# Patient Record
Sex: MALE | Race: WHITE | NOT HISPANIC OR LATINO | Employment: FULL TIME | URBAN - METROPOLITAN AREA
[De-identification: names, ages, dates, MRNs, and addresses within clinical notes are randomized per-mention and may not be internally consistent; named-entity substitution may affect disease eponyms.]

---

## 2023-03-24 PROBLEM — M10.9 GOUT: Status: ACTIVE | Noted: 2023-03-24

## 2023-03-24 PROBLEM — E55.9 MILD VITAMIN D DEFICIENCY: Status: ACTIVE | Noted: 2023-03-24

## 2023-03-24 PROBLEM — G89.29 CHRONIC RIGHT SHOULDER PAIN: Status: ACTIVE | Noted: 2023-03-24

## 2023-03-24 PROBLEM — E78.5 HYPERLIPIDEMIA: Status: ACTIVE | Noted: 2023-03-24

## 2023-03-24 PROBLEM — E03.9 HYPOTHYROID: Status: ACTIVE | Noted: 2023-03-24

## 2023-03-24 PROBLEM — M79.2 NEURALGIA OF RIGHT UPPER EXTREMITY: Status: ACTIVE | Noted: 2023-03-24

## 2023-03-24 PROBLEM — R07.89 CHEST PAIN, NON-CARDIAC: Status: ACTIVE | Noted: 2023-03-24

## 2023-03-24 PROBLEM — L30.9 DERMATITIS: Status: ACTIVE | Noted: 2023-03-24

## 2023-03-24 PROBLEM — R93.1 ELEVATED CORONARY ARTERY CALCIUM SCORE: Status: ACTIVE | Noted: 2023-03-24

## 2023-03-24 PROBLEM — M25.511 CHRONIC RIGHT SHOULDER PAIN: Status: ACTIVE | Noted: 2023-03-24

## 2023-03-24 PROBLEM — J01.90 ACUTE SINUSITIS: Status: ACTIVE | Noted: 2023-03-24

## 2023-03-24 PROBLEM — R00.2 PALPITATIONS: Status: ACTIVE | Noted: 2023-03-24

## 2023-03-24 PROBLEM — M25.569 KNEE PAIN: Status: ACTIVE | Noted: 2023-03-24

## 2023-03-24 PROBLEM — R07.89 NON-CARDIAC CHEST PAIN: Status: ACTIVE | Noted: 2023-03-24

## 2023-03-24 PROBLEM — R14.0 BLOATING: Status: ACTIVE | Noted: 2023-03-24

## 2023-03-24 PROBLEM — R39.12 WEAK URINARY STREAM: Status: ACTIVE | Noted: 2023-03-24

## 2023-03-24 PROBLEM — R06.09 EXERTIONAL DYSPNEA: Status: ACTIVE | Noted: 2023-03-24

## 2023-03-24 PROBLEM — M25.519 SHOULDER PAIN: Status: ACTIVE | Noted: 2023-03-24

## 2023-03-24 PROBLEM — L29.9 ITCHING: Status: ACTIVE | Noted: 2023-03-24

## 2023-03-24 RX ORDER — LEVOTHYROXINE SODIUM 50 UG/1
1 TABLET ORAL
COMMUNITY
Start: 2016-06-05 | End: 2023-04-25 | Stop reason: SDUPTHER

## 2023-03-24 RX ORDER — PRAVASTATIN SODIUM 80 MG/1
80 TABLET ORAL NIGHTLY
COMMUNITY
Start: 2016-11-23 | End: 2023-12-05 | Stop reason: ALTCHOICE

## 2023-03-24 RX ORDER — CICLOPIROX OLAMINE 7.7 MG/G
CREAM TOPICAL
COMMUNITY
Start: 2017-02-28 | End: 2023-06-13 | Stop reason: SDUPTHER

## 2023-03-24 RX ORDER — TAMSULOSIN HYDROCHLORIDE 0.4 MG/1
1 CAPSULE ORAL DAILY
COMMUNITY
Start: 2019-12-12 | End: 2023-06-13 | Stop reason: SDUPTHER

## 2023-03-28 ENCOUNTER — OFFICE VISIT (OUTPATIENT)
Dept: PRIMARY CARE | Facility: CLINIC | Age: 78
End: 2023-03-28
Payer: MEDICARE

## 2023-03-28 VITALS
HEART RATE: 53 BPM | DIASTOLIC BLOOD PRESSURE: 61 MMHG | WEIGHT: 162 LBS | SYSTOLIC BLOOD PRESSURE: 109 MMHG | BODY MASS INDEX: 24.55 KG/M2 | HEIGHT: 68 IN

## 2023-03-28 DIAGNOSIS — M10.072 ACUTE IDIOPATHIC GOUT INVOLVING TOE OF LEFT FOOT: Primary | ICD-10-CM

## 2023-03-28 DIAGNOSIS — N52.9 ERECTILE DYSFUNCTION, UNSPECIFIED ERECTILE DYSFUNCTION TYPE: ICD-10-CM

## 2023-03-28 PROCEDURE — 1036F TOBACCO NON-USER: CPT | Performed by: INTERNAL MEDICINE

## 2023-03-28 PROCEDURE — 99214 OFFICE O/P EST MOD 30 MIN: CPT | Performed by: INTERNAL MEDICINE

## 2023-03-28 PROCEDURE — 1159F MED LIST DOCD IN RCRD: CPT | Performed by: INTERNAL MEDICINE

## 2023-03-28 RX ORDER — TADALAFIL 10 MG/1
10 TABLET ORAL DAILY PRN
Qty: 12 TABLET | Refills: 3 | Status: SHIPPED | OUTPATIENT
Start: 2023-03-28 | End: 2024-03-19 | Stop reason: WASHOUT

## 2023-03-28 RX ORDER — INDOMETHACIN 25 MG/1
25 CAPSULE ORAL 3 TIMES DAILY PRN
Qty: 30 CAPSULE | Refills: 1 | Status: SHIPPED | OUTPATIENT
Start: 2023-03-28 | End: 2023-05-27

## 2023-03-28 ASSESSMENT — ENCOUNTER SYMPTOMS
DEPRESSION: 0
LOSS OF SENSATION IN FEET: 0
OCCASIONAL FEELINGS OF UNSTEADINESS: 0

## 2023-03-28 ASSESSMENT — PATIENT HEALTH QUESTIONNAIRE - PHQ9
SUM OF ALL RESPONSES TO PHQ9 QUESTIONS 1 AND 2: 0
2. FEELING DOWN, DEPRESSED OR HOPELESS: NOT AT ALL
1. LITTLE INTEREST OR PLEASURE IN DOING THINGS: NOT AT ALL

## 2023-03-28 ASSESSMENT — PAIN SCALES - GENERAL: PAINLEVEL: 0-NO PAIN

## 2023-03-28 NOTE — PROGRESS NOTES
"Subjective   Patient ID: Francisco Lizama is a 77 y.o. male who presents for Toe Pain.    L big toe pain & swelling    Previous Uric Acid=6.8 in 2022    HPI     L big toe pain    No trauma    No H/o Gout     Shell Fish PRN    Review of Systems    No Fever/chills/headaches/dizziness/chest pains/ shortness of breath/palpitations/Nausea/vomiting/diarrhea/ constipation/urine frequency/blood in urine.   Objective   /61 (BP Location: Left arm, Patient Position: Sitting, BP Cuff Size: Adult)   Pulse 53   Ht 1.727 m (5' 8\")   Wt 73.5 kg (162 lb)   BMI 24.63 kg/m²     Physical Exam    No JVP elevation. No palpable  Lymph Nodes    CVS-NL S1/S2 . No MRG    Lungs-CTA. B/S= B/L    Abdomen-Soft, Non-tender. No masses or HSM    Extremities: No C/C/E      Assessment/Plan       ? Gout     Recommend Indomethacin 25 mg TID/PRN for gout flare ups    Hypothyroid    HL-  Tighten diet/ Increased physical activity to a minimum of 150 minutes of moderate exercise per week.    BPH- Continue with current Rx    Continue with current Rx     CRC is due in 9/2023    Follow up PRN if symptoms persist or worsen         "

## 2023-04-15 ENCOUNTER — TELEPHONE (OUTPATIENT)
Dept: PRIMARY CARE | Facility: CLINIC | Age: 78
End: 2023-04-15
Payer: MEDICARE

## 2023-04-15 DIAGNOSIS — U07.1 COVID-19: Primary | ICD-10-CM

## 2023-04-15 NOTE — TELEPHONE ENCOUNTER
ON CALL   +COVID today, symptoms cough and fatigue started yesterday   No sob   Paxlovid candidate. No renal or hepatic issues  Hold statin/flomax during Tx and 3 d post Tx   Quarantine 5 d

## 2023-04-25 DIAGNOSIS — E03.9 HYPOTHYROIDISM, UNSPECIFIED TYPE: Primary | ICD-10-CM

## 2023-04-25 RX ORDER — LEVOTHYROXINE SODIUM 50 UG/1
50 TABLET ORAL
Qty: 90 TABLET | Refills: 3 | Status: SHIPPED | OUTPATIENT
Start: 2023-04-25 | End: 2023-09-22 | Stop reason: SDUPTHER

## 2023-04-26 ENCOUNTER — TELEPHONE (OUTPATIENT)
Dept: PRIMARY CARE | Facility: CLINIC | Age: 78
End: 2023-04-26
Payer: MEDICARE

## 2023-04-26 NOTE — TELEPHONE ENCOUNTER
Called patient and left a voice mail to call the office back. Tried to call patient again they are having trouble with their phone and is unable to hear me and keeps hanging up. Will try back again later today. Was calling patient to inform him that he tested positive for rebound covid and should isolate for 5 days.        Magaly Washington MA

## 2023-04-26 NOTE — TELEPHONE ENCOUNTER
Spoke to patient and informed him of the doctor's message. Patient was in full understanding and had no other questions or concerns. I informed him to call the office if he has any more questions.     Patient was identified with full name and date of birth.       Magaly Washington MA

## 2023-05-02 ENCOUNTER — TELEPHONE (OUTPATIENT)
Dept: PRIMARY CARE | Facility: CLINIC | Age: 78
End: 2023-05-02
Payer: MEDICARE

## 2023-05-02 NOTE — LETTER
May 8, 2023    Patient:           Francisco Lizama  YOB: 1945    From [facility]:    Return to work note PATIENTS WITH SUSPECTED or CONFIRMED COVID-19    Francisco iLzama  Date of Evaluation: 05/08/2023    Centers for Disease Control and Prevention (CDC) Criteria to discontinue home isolation and return to work:    FIVE DAYS SINCE SYMPTOMS STARTED    AND    ONE DAY of NO FEVER without the use of fever-reducing medicines like acetaminophen (Tylenol) or Ibuprofen (Motrin or Advil).    AND    Either WITHOUT SYMPTOMS or WITH IMPROVING SYMPTOMS    Francisco must continue to wear a mask around other people for a full 10 days, even if allowed to return to work.    The earliest Francisco can return to work is May 9,2023.    If you are able to test Francisco with antigen tests, and Francisco has two sequential NEGATIVE tests 48 hours apart, then Francisco may remove mask before day 10.     CDC recommends an isolation period of at least 10 days for people who cannot return to work after 5 days and up to 20 days for people who are severely ill with COVID-19 and for the people with weakened immune systems. Consult with your healthcare provider about when you can resume being around other people.    Please check the CDC website for the latest information as the criteria for home quarantine and guidelines for home isolation are changing frequently:     https://www.cdc.gov/coronavirus/2019-ncov/your-health/isolation.html     Magaly DYSON MA

## 2023-05-02 NOTE — TELEPHONE ENCOUNTER
Patient needs letter from B to state he is currently being treated for covid patient states he tested positive yesterday. This is for a refund on his flight for a trip he had planned.    Please call and advise patient when completed he will  after quarantine period. [phone]

## 2023-05-08 NOTE — TELEPHONE ENCOUNTER
Called and left voicemail to inform the patient that I am putting his letter for covid in the mail to his home address. Informed patient to call the office if he needs anything else.       Magaly Washington MA

## 2023-06-13 DIAGNOSIS — R39.12 WEAK URINARY STREAM: ICD-10-CM

## 2023-06-13 DIAGNOSIS — L30.9 DERMATITIS: ICD-10-CM

## 2023-06-13 RX ORDER — CICLOPIROX OLAMINE 7.7 MG/G
CREAM TOPICAL 2 TIMES DAILY
Qty: 45 G | Refills: 1 | Status: SHIPPED | OUTPATIENT
Start: 2023-06-13 | End: 2023-07-17 | Stop reason: SDUPTHER

## 2023-06-13 RX ORDER — TAMSULOSIN HYDROCHLORIDE 0.4 MG/1
0.4 CAPSULE ORAL 2 TIMES DAILY
Qty: 60 CAPSULE | Refills: 11 | Status: SHIPPED | OUTPATIENT
Start: 2023-06-13 | End: 2023-07-17 | Stop reason: SDUPTHER

## 2023-07-17 DIAGNOSIS — L30.9 DERMATITIS: ICD-10-CM

## 2023-07-17 DIAGNOSIS — R39.12 WEAK URINARY STREAM: ICD-10-CM

## 2023-07-17 RX ORDER — TAMSULOSIN HYDROCHLORIDE 0.4 MG/1
0.4 CAPSULE ORAL 2 TIMES DAILY
Qty: 180 CAPSULE | Refills: 3 | Status: SHIPPED | OUTPATIENT
Start: 2023-07-17 | End: 2024-03-07 | Stop reason: SDUPTHER

## 2023-07-17 RX ORDER — CICLOPIROX OLAMINE 7.7 MG/G
CREAM TOPICAL 2 TIMES DAILY
Qty: 45 G | Refills: 1 | Status: SHIPPED | OUTPATIENT
Start: 2023-07-17 | End: 2024-04-02 | Stop reason: ALTCHOICE

## 2023-09-22 DIAGNOSIS — E03.9 HYPOTHYROIDISM, UNSPECIFIED TYPE: ICD-10-CM

## 2023-09-22 RX ORDER — LEVOTHYROXINE SODIUM 50 UG/1
50 TABLET ORAL
Qty: 90 TABLET | Refills: 3 | Status: SHIPPED | OUTPATIENT
Start: 2023-09-22 | End: 2024-03-07 | Stop reason: SDUPTHER

## 2023-09-27 ENCOUNTER — TELEPHONE (OUTPATIENT)
Dept: PRIMARY CARE | Facility: CLINIC | Age: 78
End: 2023-09-27
Payer: MEDICARE

## 2023-09-27 NOTE — TELEPHONE ENCOUNTER
PT would like to know if Dr. Corcoran has a neurologist he recommends. PT having pain in her middle toes of left foot. PT stated that he is going out of town next week and is having trouble walking due to the nerve pain in foot

## 2023-09-28 ENCOUNTER — TELEPHONE (OUTPATIENT)
Dept: PRIMARY CARE | Facility: CLINIC | Age: 78
End: 2023-09-28

## 2023-09-28 DIAGNOSIS — M79.673 PAIN OF FOOT, UNSPECIFIED LATERALITY: Primary | ICD-10-CM

## 2023-11-30 ENCOUNTER — LAB (OUTPATIENT)
Dept: LAB | Facility: LAB | Age: 78
End: 2023-11-30
Payer: MEDICARE

## 2023-11-30 DIAGNOSIS — Z00.00 ROUTINE GENERAL MEDICAL EXAMINATION AT A HEALTH CARE FACILITY: ICD-10-CM

## 2023-11-30 DIAGNOSIS — R93.1 ELEVATED CORONARY ARTERY CALCIUM SCORE: ICD-10-CM

## 2023-11-30 LAB
ALBUMIN SERPL BCP-MCNC: 4.6 G/DL (ref 3.4–5)
ALP SERPL-CCNC: 58 U/L (ref 33–136)
ALT SERPL W P-5'-P-CCNC: 18 U/L (ref 10–52)
ANION GAP SERPL CALC-SCNC: 14 MMOL/L (ref 10–20)
APPEARANCE UR: CLEAR
AST SERPL W P-5'-P-CCNC: 17 U/L (ref 9–39)
BASOPHILS # BLD AUTO: 0.04 X10*3/UL (ref 0–0.1)
BASOPHILS NFR BLD AUTO: 0.7 %
BILIRUB SERPL-MCNC: 1.5 MG/DL (ref 0–1.2)
BILIRUB UR STRIP.AUTO-MCNC: NEGATIVE MG/DL
BUN SERPL-MCNC: 12 MG/DL (ref 6–23)
CALCIUM SERPL-MCNC: 9.7 MG/DL (ref 8.6–10.6)
CHLORIDE SERPL-SCNC: 105 MMOL/L (ref 98–107)
CHOLEST SERPL-MCNC: 152 MG/DL (ref 0–199)
CHOLESTEROL/HDL RATIO: 3.5
CO2 SERPL-SCNC: 28 MMOL/L (ref 21–32)
COLOR UR: YELLOW
CREAT SERPL-MCNC: 0.94 MG/DL (ref 0.5–1.3)
EOSINOPHIL # BLD AUTO: 0.09 X10*3/UL (ref 0–0.4)
EOSINOPHIL NFR BLD AUTO: 1.5 %
ERYTHROCYTE [DISTWIDTH] IN BLOOD BY AUTOMATED COUNT: 12.2 % (ref 11.5–14.5)
GFR SERPL CREATININE-BSD FRML MDRD: 83 ML/MIN/1.73M*2
GLUCOSE SERPL-MCNC: 96 MG/DL (ref 74–99)
GLUCOSE UR STRIP.AUTO-MCNC: NEGATIVE MG/DL
HCT VFR BLD AUTO: 46.1 % (ref 41–52)
HDLC SERPL-MCNC: 44 MG/DL
HGB BLD-MCNC: 15.6 G/DL (ref 13.5–17.5)
IMM GRANULOCYTES # BLD AUTO: 0.03 X10*3/UL (ref 0–0.5)
IMM GRANULOCYTES NFR BLD AUTO: 0.5 % (ref 0–0.9)
KETONES UR STRIP.AUTO-MCNC: NEGATIVE MG/DL
LDLC SERPL CALC-MCNC: 79 MG/DL
LEUKOCYTE ESTERASE UR QL STRIP.AUTO: NEGATIVE
LYMPHOCYTES # BLD AUTO: 1.52 X10*3/UL (ref 0.8–3)
LYMPHOCYTES NFR BLD AUTO: 25.5 %
MCH RBC QN AUTO: 30.6 PG (ref 26–34)
MCHC RBC AUTO-ENTMCNC: 33.8 G/DL (ref 32–36)
MCV RBC AUTO: 90 FL (ref 80–100)
MONOCYTES # BLD AUTO: 0.5 X10*3/UL (ref 0.05–0.8)
MONOCYTES NFR BLD AUTO: 8.4 %
NEUTROPHILS # BLD AUTO: 3.77 X10*3/UL (ref 1.6–5.5)
NEUTROPHILS NFR BLD AUTO: 63.4 %
NITRITE UR QL STRIP.AUTO: NEGATIVE
NON HDL CHOLESTEROL: 108 MG/DL (ref 0–149)
NRBC BLD-RTO: 0 /100 WBCS (ref 0–0)
PH UR STRIP.AUTO: 5 [PH]
PLATELET # BLD AUTO: 165 X10*3/UL (ref 150–450)
POTASSIUM SERPL-SCNC: 4.6 MMOL/L (ref 3.5–5.3)
PROT SERPL-MCNC: 6.3 G/DL (ref 6.4–8.2)
PROT UR STRIP.AUTO-MCNC: NEGATIVE MG/DL
PSA SERPL-MCNC: 1.71 NG/ML
RBC # BLD AUTO: 5.1 X10*6/UL (ref 4.5–5.9)
RBC # UR STRIP.AUTO: NEGATIVE /UL
SODIUM SERPL-SCNC: 142 MMOL/L (ref 136–145)
SP GR UR STRIP.AUTO: 1.02
TRIGL SERPL-MCNC: 145 MG/DL (ref 0–149)
TSH SERPL-ACNC: 2.62 MIU/L (ref 0.44–3.98)
URATE SERPL-MCNC: 6.7 MG/DL (ref 4–7.5)
UROBILINOGEN UR STRIP.AUTO-MCNC: <2 MG/DL
VLDL: 29 MG/DL (ref 0–40)
WBC # BLD AUTO: 6 X10*3/UL (ref 4.4–11.3)

## 2023-11-30 PROCEDURE — 85025 COMPLETE CBC W/AUTO DIFF WBC: CPT

## 2023-11-30 PROCEDURE — 84443 ASSAY THYROID STIM HORMONE: CPT

## 2023-11-30 PROCEDURE — 84550 ASSAY OF BLOOD/URIC ACID: CPT

## 2023-11-30 PROCEDURE — 80053 COMPREHEN METABOLIC PANEL: CPT

## 2023-11-30 PROCEDURE — 81003 URINALYSIS AUTO W/O SCOPE: CPT

## 2023-11-30 PROCEDURE — 36415 COLL VENOUS BLD VENIPUNCTURE: CPT

## 2023-11-30 PROCEDURE — 84153 ASSAY OF PSA TOTAL: CPT

## 2023-11-30 PROCEDURE — 80061 LIPID PANEL: CPT

## 2023-12-05 ENCOUNTER — OFFICE VISIT (OUTPATIENT)
Dept: PRIMARY CARE | Facility: CLINIC | Age: 78
End: 2023-12-05
Payer: MEDICARE

## 2023-12-05 VITALS
WEIGHT: 160 LBS | OXYGEN SATURATION: 100 % | RESPIRATION RATE: 20 BRPM | HEART RATE: 76 BPM | BODY MASS INDEX: 24.25 KG/M2 | HEIGHT: 68 IN

## 2023-12-05 DIAGNOSIS — R20.2 PARESTHESIA: ICD-10-CM

## 2023-12-05 DIAGNOSIS — R93.1 ELEVATED CORONARY ARTERY CALCIUM SCORE: ICD-10-CM

## 2023-12-05 DIAGNOSIS — Z00.00 ROUTINE GENERAL MEDICAL EXAMINATION AT A HEALTH CARE FACILITY: Primary | ICD-10-CM

## 2023-12-05 PROCEDURE — 1160F RVW MEDS BY RX/DR IN RCRD: CPT | Performed by: INTERNAL MEDICINE

## 2023-12-05 PROCEDURE — 1170F FXNL STATUS ASSESSED: CPT | Performed by: INTERNAL MEDICINE

## 2023-12-05 PROCEDURE — 1036F TOBACCO NON-USER: CPT | Performed by: INTERNAL MEDICINE

## 2023-12-05 PROCEDURE — 1126F AMNT PAIN NOTED NONE PRSNT: CPT | Performed by: INTERNAL MEDICINE

## 2023-12-05 PROCEDURE — 1159F MED LIST DOCD IN RCRD: CPT | Performed by: INTERNAL MEDICINE

## 2023-12-05 PROCEDURE — G0439 PPPS, SUBSEQ VISIT: HCPCS | Performed by: INTERNAL MEDICINE

## 2023-12-05 RX ORDER — ROSUVASTATIN CALCIUM 5 MG/1
5 TABLET, COATED ORAL NIGHTLY
COMMUNITY
Start: 2023-09-27 | End: 2023-12-29 | Stop reason: SDUPTHER

## 2023-12-05 ASSESSMENT — ACTIVITIES OF DAILY LIVING (ADL)
MANAGING_FINANCES: INDEPENDENT
DRESSING: INDEPENDENT
GROCERY_SHOPPING: INDEPENDENT
BATHING: INDEPENDENT
DOING_HOUSEWORK: INDEPENDENT
TAKING_MEDICATION: INDEPENDENT

## 2023-12-05 ASSESSMENT — PATIENT HEALTH QUESTIONNAIRE - PHQ9
1. LITTLE INTEREST OR PLEASURE IN DOING THINGS: NOT AT ALL
SUM OF ALL RESPONSES TO PHQ9 QUESTIONS 1 AND 2: 0
2. FEELING DOWN, DEPRESSED OR HOPELESS: NOT AT ALL

## 2023-12-05 ASSESSMENT — ENCOUNTER SYMPTOMS
LOSS OF SENSATION IN FEET: 1
OCCASIONAL FEELINGS OF UNSTEADINESS: 0
DEPRESSION: 0

## 2023-12-05 NOTE — PROGRESS NOTES
"Subjective   Patient ID: Francisco Lizama is a 78 y.o. male who presents for Medicare Annual Wellness Visit Subsequent.    MAW    Abnormal Lipids    Soto's Neuroma    Covid-19    HPI     Review of Systems      No Fever/chills/headaches/dizziness/chest pains/ cough/ shortness of breath/palpitations/ abdominal pain /Nausea/vomiting/diarrhea/ constipation/urine frequency/blood in urine.      Objective   Pulse 76   Resp 20   Ht 1.727 m (5' 8\")   Wt 72.6 kg (160 lb)   SpO2 100%   BMI 24.33 kg/m²     Physical Exam    No JVP elevation. No palpable Lymph Nodes. No Thyromegaly    HEENT- Negative    CVS-NL S1/S2 . No MRG    Lungs-CTA. B/S= B/L    Abdomen-Soft, Non-tender. No masses or HSM    Extremities: No C/C/E    UGT-Neg    Assessment/Plan     Abnormal Lipids    Soto's Neuroma    Covid-19    Gout    H/o Kidney stones    BPH    CRC-2018    Continue with current Rx    Follow up/ Call with any concerns    Follow up in 12 months /PRN       "

## 2023-12-29 DIAGNOSIS — R93.1 ELEVATED CORONARY ARTERY CALCIUM SCORE: Primary | ICD-10-CM

## 2023-12-29 RX ORDER — ROSUVASTATIN CALCIUM 5 MG/1
5 TABLET, COATED ORAL NIGHTLY
Qty: 90 TABLET | Refills: 3 | Status: SHIPPED | OUTPATIENT
Start: 2023-12-29 | End: 2024-12-28

## 2024-01-02 ENCOUNTER — OFFICE VISIT (OUTPATIENT)
Dept: DERMATOLOGY | Facility: CLINIC | Age: 79
End: 2024-01-02
Payer: MEDICARE

## 2024-01-02 DIAGNOSIS — D18.01 CHERRY ANGIOMA: ICD-10-CM

## 2024-01-02 DIAGNOSIS — D22.9 MULTIPLE BENIGN MELANOCYTIC NEVI: ICD-10-CM

## 2024-01-02 DIAGNOSIS — L57.8 SUN-DAMAGED SKIN: ICD-10-CM

## 2024-01-02 DIAGNOSIS — L57.0 ACTINIC KERATOSIS: Primary | ICD-10-CM

## 2024-01-02 DIAGNOSIS — L82.1 SEBORRHEIC KERATOSES: ICD-10-CM

## 2024-01-02 PROCEDURE — 1036F TOBACCO NON-USER: CPT | Performed by: DERMATOLOGY

## 2024-01-02 PROCEDURE — 17003 DESTRUCT PREMALG LES 2-14: CPT | Performed by: DERMATOLOGY

## 2024-01-02 PROCEDURE — 99203 OFFICE O/P NEW LOW 30 MIN: CPT | Performed by: DERMATOLOGY

## 2024-01-02 PROCEDURE — 17000 DESTRUCT PREMALG LESION: CPT | Performed by: DERMATOLOGY

## 2024-01-02 PROCEDURE — 1126F AMNT PAIN NOTED NONE PRSNT: CPT | Performed by: DERMATOLOGY

## 2024-01-02 PROCEDURE — 1159F MED LIST DOCD IN RCRD: CPT | Performed by: DERMATOLOGY

## 2024-01-02 ASSESSMENT — ITCH NUMERIC RATING SCALE: HOW SEVERE IS YOUR ITCHING?: 0

## 2024-01-02 ASSESSMENT — DERMATOLOGY PATIENT ASSESSMENT
DO YOU HAVE ANY NEW OR CHANGING LESIONS: YES
DO YOU USE A TANNING BED: NO
ARE YOU AN ORGAN TRANSPLANT RECIPIENT: NO
HAVE YOU HAD OR DO YOU HAVE A STAPH INFECTION: NO
HAVE YOU HAD OR DO YOU HAVE VASCULAR DISEASE: NO

## 2024-01-02 ASSESSMENT — DERMATOLOGY QUALITY OF LIFE (QOL) ASSESSMENT
RATE HOW BOTHERED YOU ARE BY SYMPTOMS OF YOUR SKIN PROBLEM (EG, ITCHING, STINGING BURNING, HURTING OR SKIN IRRITATION): 0 - NEVER BOTHERED
RATE HOW BOTHERED YOU ARE BY EFFECTS OF YOUR SKIN PROBLEMS ON YOUR ACTIVITIES (EG, GOING OUT, ACCOMPLISHING WHAT YOU WANT, WORK ACTIVITIES OR YOUR RELATIONSHIPS WITH OTHERS): 0 - NEVER BOTHERED
WHAT SINGLE SKIN CONDITION LISTED BELOW IS THE PATIENT ANSWERING THE QUALITY-OF-LIFE ASSESSMENT QUESTIONS ABOUT: NONE OF THE ABOVE
DATE THE QUALITY-OF-LIFE ASSESSMENT WAS COMPLETED: 66841
RATE HOW EMOTIONALLY BOTHERED YOU ARE BY YOUR SKIN PROBLEM (FOR EXAMPLE, WORRY, EMBARRASSMENT, FRUSTRATION): 0 - NEVER BOTHERED

## 2024-01-02 ASSESSMENT — PATIENT GLOBAL ASSESSMENT (PGA): PATIENT GLOBAL ASSESSMENT: PATIENT GLOBAL ASSESSMENT:  1 - CLEAR

## 2024-01-02 NOTE — PROGRESS NOTES
Subjective     Francisco Lizama is a 78 y.o. male who presents for the following: No chief complaint on file..     Review of Systems:  No other skin or systemic complaints other than what is documented elsewhere in the note.    The following portions of the chart were reviewed this encounter and updated as appropriate:         Skin Cancer History  No skin cancer on file.      Specialty Problems          Dermatology Problems    Dermatitis    Itching        Objective   Well appearing patient in no apparent distress; mood and affect are within normal limits.    A full examination was performed including scalp, head, eyes, ears, nose, lips, neck, chest, axillae, abdomen, back, buttocks, bilateral upper extremities, bilateral lower extremities, hands, feet, fingers, toes, fingernails, and toenails. All findings within normal limits unless otherwise noted below.    Assessment/Plan

## 2024-01-02 NOTE — PROGRESS NOTES
Subjective     Francisco Lizama is a 78 y.o. male who presents for the following: Skin Check.     Review of Systems:  No other skin or systemic complaints other than what is documented elsewhere in the note.    The following portions of the chart were reviewed this encounter and updated as appropriate:       Specialty Problems          Dermatology Problems    Dermatitis    Itching     Past Medical History:  Francisco Lizama  has a past medical history of Lipid disorder, Other specified health status, Other specified health status, Paroxysmal atrial fibrillation (CMS/HCC), Personal history of other diseases of male genital organs (07/05/2016), Personal history of other specified conditions (12/12/2017), Personal history of other specified conditions (07/05/2016), and Raynaud's syndrome without gangrene.    Past Surgical History:  Francisco Lizama  has a past surgical history that includes Tonsillectomy.    Family History:  Patient family history includes CABG in his father; No Known Problems in his brother; lung fibrosis in his mother.    Social History:  Francisco Lizama  reports that he has never smoked. He has never used smokeless tobacco. He reports current alcohol use of about 1.0 standard drink of alcohol per week. He reports that he does not use drugs.    Allergies:  Patient has no known allergies.    Current Medications / CAM's:    Current Outpatient Medications:     ciclopirox (Loprox) 0.77 % cream, Apply topically 2 times a day. APPLY ON THE SKIN BID 30 gram tube, Disp: 45 g, Rfl: 1    levothyroxine (Synthroid, Levoxyl) 50 mcg tablet, Take 1 tablet (50 mcg) by mouth once every day., Disp: 90 tablet, Rfl: 3    rosuvastatin (Crestor) 5 mg tablet, Take 1 tablet (5 mg) by mouth once daily at bedtime., Disp: 90 tablet, Rfl: 3    tadalafil (Cialis) 10 mg tablet, Take 1 tablet (10 mg) by mouth once daily as needed for erectile dysfunction., Disp: 12 tablet, Rfl: 3    tamsulosin (Flomax) 0.4 mg 24 hr capsule, Take 1  capsule (0.4 mg) by mouth 2 times a day., Disp: 180 capsule, Rfl: 3     Objective   Well appearing patient in no apparent distress; mood and affect are within normal limits.    A full examination was performed including scalp, head, eyes, ears, nose, lips, neck, chest, axillae, abdomen, back, buttocks, bilateral upper extremities, bilateral lower extremities, hands, feet, fingers, toes, fingernails, and toenails. All findings within normal limits unless otherwise noted below. Deferred genital/perianal exam.     - Scattered waxy tan/grey/brown papules with horn cysts    - scattered regular brown macules and papules    - scattered small bright red papules and macules    - scattered tan macules, telangiectasias, and general photo-damage    Dorsum of Nose, Left Parotid Area, Left Zygomatic Area (2), Mid Parietal Scalp (2)  Erythematous to tan macules with gritty scale.         Assessment/Plan   Actinic keratosis (6)  Mid Parietal Scalp (2); Dorsum of Nose; Left Zygomatic Area (2); Left Parotid Area    Actinic keratoses  - The premalignant nature of the disorder was reviewed and treatment options were reviewed.   - Patient agreeable to treatment with cryotherapy today.  Sites confirmed. Risks and benefits reviewed including but not limited to pain, redness, swelling, blister, scab, healing with hypo or hyperpigmentation, and scar. Chance of recurrence or persistence reviewed.      Destr of lesion - Dorsum of Nose, Left Parotid Area, Left Zygomatic Area (2), Mid Parietal Scalp (2)  Complexity: simple    Destruction method: cryotherapy    Informed consent: discussed and consent obtained    Lesion destroyed using liquid nitrogen: Yes    Cryotherapy cycles:  1  Outcome: patient tolerated procedure well with no complications    Post-procedure details: wound care instructions given      Related Procedures  Follow Up In Dermatology - Established Patient    Seborrheic keratoses    Seborrheic keratosis (-es)  - Discussed benign  nature and that no treatment is necessary unless it becomes painful or increases in size. Patient opts for clinical monitoring at this time.      Multiple benign melanocytic nevi    Benign melanocytic nevi  - Discussed benign nature and that no treatment is necessary unless it becomes painful or increases in size. Patient opts for clinical monitoring at this time.    - Sun protective behavior reviewed and encouraged including the use of over-the-counter sunscreen with SPF30+ daily (reapply every 1.5 hours when outdoors), UPF clothing, broad rimmed hats, sunglasses, and avoidance of midday sun. Home skin monitoring encouraged and how to monitor for skin cancer (changing or new moles, new rapidly growing or non-healing lesions) reviewed. Patient encouraged to call with interval concerns or changes.      Patricia angioma    Cherry angioma(s)  - Discussed benign nature and that no treatment is necessary unless it becomes painful or increases in size. Patient opts for clinical monitoring at this time.      Sun-damaged skin    Actinically damaged skin-  - Sun protective behavior reviewed and encouraged including the use of over-the-counter sunscreen with SPF30+ daily (reapply every 1.5 hours when outdoors), UPF clothing, broad rimmed hats, sunglasses, and avoidance of midday sun. Home skin monitoring encouraged and how to monitor for skin cancer (changing or new moles, new rapidly growing or non-healing lesions) reviewed. Patient encouraged to call with interval concerns or changes.           FUV for FBSE in 9-12 mos given significant actinic damage  Linda Baires MD       no fever and no chills.

## 2024-01-08 ENCOUNTER — TELEPHONE (OUTPATIENT)
Dept: PRIMARY CARE | Facility: CLINIC | Age: 79
End: 2024-01-08
Payer: MEDICARE

## 2024-01-08 NOTE — TELEPHONE ENCOUNTER
Spoke with patient and informed him that b had sent over a years supply to the [facility] on file in sept. He said he will call the pharmacy.      Magaly Washington MA

## 2024-01-08 NOTE — TELEPHONE ENCOUNTER
Pt call for refill on levothyroxine 50 mcg send to [facility] [place] pt also request call back from dr. MAY

## 2024-01-10 ENCOUNTER — TELEPHONE (OUTPATIENT)
Dept: CARDIOLOGY | Facility: HOSPITAL | Age: 79
End: 2024-01-10
Payer: MEDICARE

## 2024-01-10 DIAGNOSIS — I48.0 PAROXYSMAL A-FIB (MULTI): Primary | ICD-10-CM

## 2024-01-11 NOTE — TELEPHONE ENCOUNTER
Called back patient who referred an episode of 1-2 hours of irregular heart beat without significant tachycardia. No additional symptoms. Symptoms resolved spontaneously. He report a prior episode years ago of proven atrial fibrillation. He is unsure if symptoms are similar. I recommended a 2 weeks Holter and to go to the ED if symptoms reappear.    James Calix MD

## 2024-01-16 ENCOUNTER — HOSPITAL ENCOUNTER (OUTPATIENT)
Dept: CARDIOLOGY | Facility: CLINIC | Age: 79
Discharge: HOME | End: 2024-01-16
Payer: MEDICARE

## 2024-01-16 DIAGNOSIS — I48.0 PAROXYSMAL A-FIB (MULTI): ICD-10-CM

## 2024-01-16 PROCEDURE — 93246 EXT ECG>7D<15D RECORDING: CPT

## 2024-01-19 ENCOUNTER — TELEPHONE (OUTPATIENT)
Dept: PRIMARY CARE | Facility: CLINIC | Age: 79
End: 2024-01-19
Payer: MEDICARE

## 2024-01-19 NOTE — TELEPHONE ENCOUNTER
PT called and stated that his RX: tamsulosin dose was changed by Dr. Corcoran to 1 capsule once per day. PT stated that on his next RF he will need the script to state once per day not twice per day

## 2024-02-06 ENCOUNTER — HOSPITAL ENCOUNTER (OUTPATIENT)
Dept: CARDIOLOGY | Facility: CLINIC | Age: 79
Discharge: HOME | End: 2024-02-06
Payer: MEDICARE

## 2024-02-06 DIAGNOSIS — I48.91 ATRIAL FIBRILLATION, UNSPECIFIED TYPE (MULTI): ICD-10-CM

## 2024-03-07 DIAGNOSIS — E03.9 HYPOTHYROIDISM, UNSPECIFIED TYPE: ICD-10-CM

## 2024-03-07 DIAGNOSIS — R39.12 WEAK URINARY STREAM: ICD-10-CM

## 2024-03-07 RX ORDER — LEVOTHYROXINE SODIUM 50 UG/1
50 TABLET ORAL
Qty: 90 TABLET | Refills: 3 | Status: SHIPPED | OUTPATIENT
Start: 2024-03-07 | End: 2025-03-07

## 2024-03-07 RX ORDER — TAMSULOSIN HYDROCHLORIDE 0.4 MG/1
0.4 CAPSULE ORAL DAILY
Qty: 90 CAPSULE | Refills: 3 | Status: SHIPPED | OUTPATIENT
Start: 2024-03-07 | End: 2025-03-07

## 2024-03-19 ENCOUNTER — APPOINTMENT (OUTPATIENT)
Dept: CARDIOLOGY | Facility: HOSPITAL | Age: 79
End: 2024-03-19
Payer: MEDICARE

## 2024-03-19 ENCOUNTER — APPOINTMENT (OUTPATIENT)
Dept: RADIOLOGY | Facility: HOSPITAL | Age: 79
End: 2024-03-19
Payer: MEDICARE

## 2024-03-19 ENCOUNTER — APPOINTMENT (OUTPATIENT)
Dept: LAB | Facility: LAB | Age: 79
End: 2024-03-19
Payer: MEDICARE

## 2024-03-19 ENCOUNTER — HOSPITAL ENCOUNTER (OUTPATIENT)
Facility: HOSPITAL | Age: 79
Setting detail: OBSERVATION
Discharge: HOME | End: 2024-03-20
Attending: EMERGENCY MEDICINE | Admitting: INTERNAL MEDICINE
Payer: MEDICARE

## 2024-03-19 ENCOUNTER — OFFICE VISIT (OUTPATIENT)
Dept: PRIMARY CARE | Facility: CLINIC | Age: 79
End: 2024-03-19
Payer: MEDICARE

## 2024-03-19 VITALS
TEMPERATURE: 97.5 F | DIASTOLIC BLOOD PRESSURE: 67 MMHG | BODY MASS INDEX: 24.33 KG/M2 | RESPIRATION RATE: 16 BRPM | HEART RATE: 47 BPM | OXYGEN SATURATION: 96 % | WEIGHT: 160 LBS | SYSTOLIC BLOOD PRESSURE: 108 MMHG

## 2024-03-19 DIAGNOSIS — R06.02 SOB (SHORTNESS OF BREATH) ON EXERTION: ICD-10-CM

## 2024-03-19 DIAGNOSIS — R06.02 SHORTNESS OF BREATH: ICD-10-CM

## 2024-03-19 DIAGNOSIS — R07.9 CHEST PAIN, UNSPECIFIED TYPE: Primary | ICD-10-CM

## 2024-03-19 DIAGNOSIS — R29.810 FACIAL DROOP: ICD-10-CM

## 2024-03-19 DIAGNOSIS — I48.0 PAROXYSMAL ATRIAL FIBRILLATION (MULTI): ICD-10-CM

## 2024-03-19 DIAGNOSIS — R93.1 ELEVATED CORONARY ARTERY CALCIUM SCORE: ICD-10-CM

## 2024-03-19 DIAGNOSIS — E78.2 MIXED HYPERLIPIDEMIA: ICD-10-CM

## 2024-03-19 DIAGNOSIS — L30.9 DERMATITIS: ICD-10-CM

## 2024-03-19 DIAGNOSIS — R07.89 OTHER CHEST PAIN: ICD-10-CM

## 2024-03-19 DIAGNOSIS — R06.01 ORTHOPNEA: ICD-10-CM

## 2024-03-19 PROBLEM — M79.2 PERIPHERAL NEURALGIA: Status: ACTIVE | Noted: 2024-03-19

## 2024-03-19 PROBLEM — I73.00 RAYNAUD'S DISEASE: Status: ACTIVE | Noted: 2024-03-19

## 2024-03-19 PROBLEM — R06.09 EXERTIONAL DYSPNEA: Status: RESOLVED | Noted: 2023-03-24 | Resolved: 2024-03-19

## 2024-03-19 PROBLEM — R14.0 BLOATING: Status: RESOLVED | Noted: 2023-03-24 | Resolved: 2024-03-19

## 2024-03-19 PROBLEM — N40.1 BENIGN PROSTATIC HYPERPLASIA WITH WEAK URINARY STREAM: Status: ACTIVE | Noted: 2023-03-24

## 2024-03-19 PROBLEM — L29.9 ITCHING: Status: RESOLVED | Noted: 2023-03-24 | Resolved: 2024-03-19

## 2024-03-19 PROBLEM — J01.90 ACUTE SINUSITIS: Status: RESOLVED | Noted: 2023-03-24 | Resolved: 2024-03-19

## 2024-03-19 PROBLEM — M25.519 SHOULDER PAIN: Status: RESOLVED | Noted: 2023-03-24 | Resolved: 2024-03-19

## 2024-03-19 PROBLEM — R00.2 PALPITATIONS: Status: RESOLVED | Noted: 2023-03-24 | Resolved: 2024-03-19

## 2024-03-19 PROBLEM — D22.9 MULTIPLE BENIGN MELANOCYTIC NEVI: Status: ACTIVE | Noted: 2024-03-19

## 2024-03-19 PROBLEM — M1A.09X0 IDIOPATHIC CHRONIC GOUT OF MULTIPLE SITES WITHOUT TOPHUS: Status: ACTIVE | Noted: 2023-03-24

## 2024-03-19 PROBLEM — M25.569 KNEE PAIN: Status: RESOLVED | Noted: 2023-03-24 | Resolved: 2024-03-19

## 2024-03-19 PROBLEM — M79.2 NEURALGIA OF RIGHT UPPER EXTREMITY: Status: RESOLVED | Noted: 2023-03-24 | Resolved: 2024-03-19

## 2024-03-19 LAB
ALBUMIN SERPL BCP-MCNC: 4.9 G/DL (ref 3.4–5)
ALP SERPL-CCNC: 70 U/L (ref 33–136)
ALT SERPL W P-5'-P-CCNC: 15 U/L (ref 10–52)
ANION GAP SERPL CALC-SCNC: 12 MMOL/L (ref 10–20)
AST SERPL W P-5'-P-CCNC: 18 U/L (ref 9–39)
ATRIAL RATE: 46 BPM
BASOPHILS # BLD AUTO: 0.04 X10*3/UL (ref 0–0.1)
BASOPHILS NFR BLD AUTO: 0.6 %
BILIRUB SERPL-MCNC: 1.4 MG/DL (ref 0–1.2)
BNP SERPL-MCNC: 35 PG/ML (ref 0–99)
BUN SERPL-MCNC: 17 MG/DL (ref 6–23)
CALCIUM SERPL-MCNC: 9.7 MG/DL (ref 8.6–10.3)
CARDIAC TROPONIN I PNL SERPL HS: 3 NG/L (ref 0–20)
CARDIAC TROPONIN I PNL SERPL HS: 4 NG/L (ref 0–20)
CHLORIDE SERPL-SCNC: 103 MMOL/L (ref 98–107)
CO2 SERPL-SCNC: 28 MMOL/L (ref 21–32)
CREAT SERPL-MCNC: 1 MG/DL (ref 0.5–1.3)
CRP SERPL-MCNC: 0.13 MG/DL
D DIMER PPP FEU-MCNC: 321 NG/ML FEU
EGFRCR SERPLBLD CKD-EPI 2021: 78 ML/MIN/1.73M*2
EOSINOPHIL # BLD AUTO: 0.17 X10*3/UL (ref 0–0.4)
EOSINOPHIL NFR BLD AUTO: 2.6 %
ERYTHROCYTE [DISTWIDTH] IN BLOOD BY AUTOMATED COUNT: 12.3 % (ref 11.5–14.5)
GLUCOSE SERPL-MCNC: 85 MG/DL (ref 74–99)
HCT VFR BLD AUTO: 47.6 % (ref 41–52)
HGB BLD-MCNC: 16.2 G/DL (ref 13.5–17.5)
IMM GRANULOCYTES # BLD AUTO: 0.02 X10*3/UL (ref 0–0.5)
IMM GRANULOCYTES NFR BLD AUTO: 0.3 % (ref 0–0.9)
LYMPHOCYTES # BLD AUTO: 1.79 X10*3/UL (ref 0.8–3)
LYMPHOCYTES NFR BLD AUTO: 27 %
MAGNESIUM SERPL-MCNC: 2.2 MG/DL (ref 1.6–2.4)
MCH RBC QN AUTO: 30.3 PG (ref 26–34)
MCHC RBC AUTO-ENTMCNC: 34 G/DL (ref 32–36)
MCV RBC AUTO: 89 FL (ref 80–100)
MONOCYTES # BLD AUTO: 0.62 X10*3/UL (ref 0.05–0.8)
MONOCYTES NFR BLD AUTO: 9.3 %
NEUTROPHILS # BLD AUTO: 4 X10*3/UL (ref 1.6–5.5)
NEUTROPHILS NFR BLD AUTO: 60.2 %
NRBC BLD-RTO: 0 /100 WBCS (ref 0–0)
P AXIS: 58 DEGREES
P OFFSET: 176 MS
P ONSET: 120 MS
PLATELET # BLD AUTO: 174 X10*3/UL (ref 150–450)
POTASSIUM SERPL-SCNC: 4.2 MMOL/L (ref 3.5–5.3)
PR INTERVAL: 202 MS
PROT SERPL-MCNC: 7.1 G/DL (ref 6.4–8.2)
Q ONSET: 221 MS
QRS COUNT: 8 BEATS
QRS DURATION: 88 MS
QT INTERVAL: 418 MS
QTC CALCULATION(BAZETT): 365 MS
QTC FREDERICIA: 382 MS
R AXIS: 46 DEGREES
RBC # BLD AUTO: 5.35 X10*6/UL (ref 4.5–5.9)
SODIUM SERPL-SCNC: 139 MMOL/L (ref 136–145)
T AXIS: 36 DEGREES
T OFFSET: 430 MS
TSH SERPL-ACNC: 3.14 MIU/L (ref 0.44–3.98)
VENTRICULAR RATE: 46 BPM
WBC # BLD AUTO: 6.6 X10*3/UL (ref 4.4–11.3)

## 2024-03-19 PROCEDURE — 83880 ASSAY OF NATRIURETIC PEPTIDE: CPT | Performed by: INTERNAL MEDICINE

## 2024-03-19 PROCEDURE — 1159F MED LIST DOCD IN RCRD: CPT

## 2024-03-19 PROCEDURE — G0378 HOSPITAL OBSERVATION PER HR: HCPCS

## 2024-03-19 PROCEDURE — 84484 ASSAY OF TROPONIN QUANT: CPT | Performed by: EMERGENCY MEDICINE

## 2024-03-19 PROCEDURE — 83735 ASSAY OF MAGNESIUM: CPT | Performed by: NURSE PRACTITIONER

## 2024-03-19 PROCEDURE — 99285 EMERGENCY DEPT VISIT HI MDM: CPT | Mod: 25

## 2024-03-19 PROCEDURE — 71045 X-RAY EXAM CHEST 1 VIEW: CPT | Mod: FOREIGN READ | Performed by: RADIOLOGY

## 2024-03-19 PROCEDURE — 87637 SARSCOV2&INF A&B&RSV AMP PRB: CPT

## 2024-03-19 PROCEDURE — 85025 COMPLETE CBC W/AUTO DIFF WBC: CPT | Performed by: EMERGENCY MEDICINE

## 2024-03-19 PROCEDURE — 1160F RVW MEDS BY RX/DR IN RCRD: CPT

## 2024-03-19 PROCEDURE — 84443 ASSAY THYROID STIM HORMONE: CPT | Performed by: NURSE PRACTITIONER

## 2024-03-19 PROCEDURE — 84075 ASSAY ALKALINE PHOSPHATASE: CPT | Performed by: EMERGENCY MEDICINE

## 2024-03-19 PROCEDURE — 1036F TOBACCO NON-USER: CPT

## 2024-03-19 PROCEDURE — 36415 COLL VENOUS BLD VENIPUNCTURE: CPT | Performed by: EMERGENCY MEDICINE

## 2024-03-19 PROCEDURE — 83036 HEMOGLOBIN GLYCOSYLATED A1C: CPT | Mod: AHULAB | Performed by: NURSE PRACTITIONER

## 2024-03-19 PROCEDURE — 93000 ELECTROCARDIOGRAM COMPLETE: CPT

## 2024-03-19 PROCEDURE — 93005 ELECTROCARDIOGRAM TRACING: CPT

## 2024-03-19 PROCEDURE — 99213 OFFICE O/P EST LOW 20 MIN: CPT

## 2024-03-19 PROCEDURE — 86140 C-REACTIVE PROTEIN: CPT | Performed by: INTERNAL MEDICINE

## 2024-03-19 PROCEDURE — 71045 X-RAY EXAM CHEST 1 VIEW: CPT

## 2024-03-19 PROCEDURE — 2500000001 HC RX 250 WO HCPCS SELF ADMINISTERED DRUGS (ALT 637 FOR MEDICARE OP): Performed by: NURSE PRACTITIONER

## 2024-03-19 PROCEDURE — 85379 FIBRIN DEGRADATION QUANT: CPT | Performed by: EMERGENCY MEDICINE

## 2024-03-19 PROCEDURE — 99222 1ST HOSP IP/OBS MODERATE 55: CPT

## 2024-03-19 PROCEDURE — 2500000002 HC RX 250 W HCPCS SELF ADMINISTERED DRUGS (ALT 637 FOR MEDICARE OP, ALT 636 FOR OP/ED): Performed by: NURSE PRACTITIONER

## 2024-03-19 PROCEDURE — 2500000004 HC RX 250 GENERAL PHARMACY W/ HCPCS (ALT 636 FOR OP/ED): Performed by: NURSE PRACTITIONER

## 2024-03-19 PROCEDURE — 70450 CT HEAD/BRAIN W/O DYE: CPT

## 2024-03-19 PROCEDURE — 70450 CT HEAD/BRAIN W/O DYE: CPT | Performed by: RADIOLOGY

## 2024-03-19 RX ORDER — NAPROXEN SODIUM 220 MG/1
324 TABLET, FILM COATED ORAL ONCE
Status: COMPLETED | OUTPATIENT
Start: 2024-03-19 | End: 2024-03-19

## 2024-03-19 RX ORDER — ENOXAPARIN SODIUM 100 MG/ML
40 INJECTION SUBCUTANEOUS EVERY 24 HOURS
Status: DISCONTINUED | OUTPATIENT
Start: 2024-03-19 | End: 2024-03-20 | Stop reason: HOSPADM

## 2024-03-19 RX ORDER — NAPROXEN SODIUM 220 MG/1
81 TABLET, FILM COATED ORAL DAILY
Status: DISCONTINUED | OUTPATIENT
Start: 2024-03-20 | End: 2024-03-20 | Stop reason: HOSPADM

## 2024-03-19 RX ORDER — PANTOPRAZOLE SODIUM 40 MG/1
40 TABLET, DELAYED RELEASE ORAL
Status: DISCONTINUED | OUTPATIENT
Start: 2024-03-20 | End: 2024-03-20 | Stop reason: HOSPADM

## 2024-03-19 RX ORDER — TALC
3 POWDER (GRAM) TOPICAL NIGHTLY PRN
Status: DISCONTINUED | OUTPATIENT
Start: 2024-03-19 | End: 2024-03-20 | Stop reason: HOSPADM

## 2024-03-19 RX ORDER — TAMSULOSIN HYDROCHLORIDE 0.4 MG/1
0.4 CAPSULE ORAL NIGHTLY
Status: DISCONTINUED | OUTPATIENT
Start: 2024-03-19 | End: 2024-03-20 | Stop reason: HOSPADM

## 2024-03-19 RX ORDER — ACETAMINOPHEN 325 MG/1
650 TABLET ORAL EVERY 4 HOURS PRN
Status: DISCONTINUED | OUTPATIENT
Start: 2024-03-19 | End: 2024-03-20 | Stop reason: HOSPADM

## 2024-03-19 RX ORDER — LEVOTHYROXINE SODIUM 50 UG/1
50 TABLET ORAL DAILY
Status: DISCONTINUED | OUTPATIENT
Start: 2024-03-20 | End: 2024-03-20 | Stop reason: HOSPADM

## 2024-03-19 RX ORDER — ROSUVASTATIN CALCIUM 10 MG/1
5 TABLET, COATED ORAL DAILY
Status: DISCONTINUED | OUTPATIENT
Start: 2024-03-20 | End: 2024-03-20 | Stop reason: HOSPADM

## 2024-03-19 RX ORDER — POLYETHYLENE GLYCOL 3350 17 G/17G
17 POWDER, FOR SOLUTION ORAL DAILY
Status: DISCONTINUED | OUTPATIENT
Start: 2024-03-20 | End: 2024-03-20 | Stop reason: HOSPADM

## 2024-03-19 RX ORDER — ONDANSETRON HYDROCHLORIDE 2 MG/ML
4 INJECTION, SOLUTION INTRAVENOUS EVERY 8 HOURS PRN
Status: DISCONTINUED | OUTPATIENT
Start: 2024-03-19 | End: 2024-03-20 | Stop reason: HOSPADM

## 2024-03-19 RX ADMIN — TAMSULOSIN HYDROCHLORIDE 0.4 MG: 0.4 CAPSULE ORAL at 21:38

## 2024-03-19 RX ADMIN — ASPIRIN 81 MG 324 MG: 81 TABLET ORAL at 18:11

## 2024-03-19 RX ADMIN — ENOXAPARIN SODIUM 40 MG: 40 INJECTION SUBCUTANEOUS at 21:38

## 2024-03-19 SDOH — SOCIAL STABILITY: SOCIAL INSECURITY
WITHIN THE LAST YEAR, HAVE YOU BEEN KICKED, HIT, SLAPPED, OR OTHERWISE PHYSICALLY HURT BY YOUR PARTNER OR EX-PARTNER?: NO

## 2024-03-19 SDOH — SOCIAL STABILITY: SOCIAL INSECURITY
WITHIN THE LAST YEAR, HAVE TO BEEN RAPED OR FORCED TO HAVE ANY KIND OF SEXUAL ACTIVITY BY YOUR PARTNER OR EX-PARTNER?: NO

## 2024-03-19 SDOH — SOCIAL STABILITY: SOCIAL INSECURITY: DOES ANYONE TRY TO KEEP YOU FROM HAVING/CONTACTING OTHER FRIENDS OR DOING THINGS OUTSIDE YOUR HOME?: NO

## 2024-03-19 SDOH — ECONOMIC STABILITY: INCOME INSECURITY: IN THE PAST 12 MONTHS, HAS THE ELECTRIC, GAS, OIL, OR WATER COMPANY THREATENED TO SHUT OFF SERVICE IN YOUR HOME?: NO

## 2024-03-19 SDOH — SOCIAL STABILITY: SOCIAL INSECURITY: WITHIN THE LAST YEAR, HAVE YOU BEEN HUMILIATED OR EMOTIONALLY ABUSED IN OTHER WAYS BY YOUR PARTNER OR EX-PARTNER?: NO

## 2024-03-19 SDOH — SOCIAL STABILITY: SOCIAL INSECURITY: HAS ANYONE EVER THREATENED TO HURT YOUR FAMILY OR YOUR PETS?: NO

## 2024-03-19 SDOH — SOCIAL STABILITY: SOCIAL INSECURITY: ARE YOU OR HAVE YOU BEEN THREATENED OR ABUSED PHYSICALLY, EMOTIONALLY, OR SEXUALLY BY ANYONE?: NO

## 2024-03-19 SDOH — SOCIAL STABILITY: SOCIAL INSECURITY: WITHIN THE LAST YEAR, HAVE YOU BEEN AFRAID OF YOUR PARTNER OR EX-PARTNER?: NO

## 2024-03-19 SDOH — HEALTH STABILITY: MENTAL HEALTH: HOW OFTEN DO YOU HAVE A DRINK CONTAINING ALCOHOL?: 2-4 TIMES A MONTH

## 2024-03-19 SDOH — HEALTH STABILITY: MENTAL HEALTH: HOW MANY STANDARD DRINKS CONTAINING ALCOHOL DO YOU HAVE ON A TYPICAL DAY?: 1 OR 2

## 2024-03-19 SDOH — SOCIAL STABILITY: SOCIAL INSECURITY: DO YOU FEEL UNSAFE GOING BACK TO THE PLACE WHERE YOU ARE LIVING?: NO

## 2024-03-19 SDOH — HEALTH STABILITY: MENTAL HEALTH: HOW OFTEN DO YOU HAVE 6 OR MORE DRINKS ON ONE OCCASION?: NEVER

## 2024-03-19 SDOH — SOCIAL STABILITY: SOCIAL INSECURITY: HAVE YOU HAD THOUGHTS OF HARMING ANYONE ELSE?: NO

## 2024-03-19 SDOH — SOCIAL STABILITY: SOCIAL INSECURITY: WERE YOU ABLE TO COMPLETE ALL THE BEHAVIORAL HEALTH SCREENINGS?: YES

## 2024-03-19 SDOH — SOCIAL STABILITY: SOCIAL INSECURITY: DO YOU FEEL ANYONE HAS EXPLOITED OR TAKEN ADVANTAGE OF YOU FINANCIALLY OR OF YOUR PERSONAL PROPERTY?: NO

## 2024-03-19 SDOH — SOCIAL STABILITY: SOCIAL INSECURITY: ARE THERE ANY APPARENT SIGNS OF INJURIES/BEHAVIORS THAT COULD BE RELATED TO ABUSE/NEGLECT?: NO

## 2024-03-19 SDOH — SOCIAL STABILITY: SOCIAL INSECURITY: ABUSE: ADULT

## 2024-03-19 ASSESSMENT — ENCOUNTER SYMPTOMS
DIAPHORESIS: 0
HEMATURIA: 0
FATIGUE: 1
VOICE CHANGE: 0
SORE THROAT: 0
APNEA: 0
CHEST TIGHTNESS: 1
SHORTNESS OF BREATH: 1
CHILLS: 0
COUGH: 0
SLEEP DISTURBANCE: 0
POLYPHAGIA: 0
PHOTOPHOBIA: 0
LIGHT-HEADEDNESS: 0
AGITATION: 0
NAUSEA: 0
ENDOCRINE NEGATIVE: 1
DIARRHEA: 1
FACIAL ASYMMETRY: 1
SPEECH DIFFICULTY: 0
WHEEZING: 0
RHINORRHEA: 0
CONFUSION: 0
DYSURIA: 0
MYALGIAS: 0
NUMBNESS: 0
DIFFICULTY URINATING: 0
SINUS PRESSURE: 0
BACK PAIN: 0
ACTIVITY CHANGE: 0
DEPRESSION: 0
TROUBLE SWALLOWING: 0
DYSPHORIC MOOD: 0
ARTHRALGIAS: 1
NECK STIFFNESS: 0
COLOR CHANGE: 0
FEVER: 0
ABDOMINAL PAIN: 0
RECTAL PAIN: 0
BRUISES/BLEEDS EASILY: 0
CONSTIPATION: 0
SEIZURES: 0
PSYCHIATRIC NEGATIVE: 1
EYES NEGATIVE: 1
HEMATOLOGIC/LYMPHATIC NEGATIVE: 1
NERVOUS/ANXIOUS: 0
ABDOMINAL DISTENTION: 0
NECK PAIN: 0
EYE DISCHARGE: 0
APPETITE CHANGE: 0
POLYDIPSIA: 0
BLOOD IN STOOL: 0
OCCASIONAL FEELINGS OF UNSTEADINESS: 0
JOINT SWELLING: 0
FREQUENCY: 0
LOSS OF SENSATION IN FEET: 0
HEADACHES: 1
STRIDOR: 0
ANAL BLEEDING: 0
PALPITATIONS: 0
WEAKNESS: 0
FLANK PAIN: 0
DIZZINESS: 0
TREMORS: 0
HYPERACTIVE: 0
UNEXPECTED WEIGHT CHANGE: 0

## 2024-03-19 ASSESSMENT — PATIENT HEALTH QUESTIONNAIRE - PHQ9
SUM OF ALL RESPONSES TO PHQ9 QUESTIONS 1 AND 2: 0
1. LITTLE INTEREST OR PLEASURE IN DOING THINGS: NOT AT ALL
SUM OF ALL RESPONSES TO PHQ9 QUESTIONS 1 & 2: 0
1. LITTLE INTEREST OR PLEASURE IN DOING THINGS: NOT AT ALL
2. FEELING DOWN, DEPRESSED OR HOPELESS: NOT AT ALL
2. FEELING DOWN, DEPRESSED OR HOPELESS: NOT AT ALL

## 2024-03-19 ASSESSMENT — LIFESTYLE VARIABLES
HOW OFTEN DO YOU HAVE A DRINK CONTAINING ALCOHOL: 2-4 TIMES A MONTH
SUBSTANCE_ABUSE_PAST_12_MONTHS: NO
PRESCIPTION_ABUSE_PAST_12_MONTHS: NO
SKIP TO QUESTIONS 9-10: 1
AUDIT-C TOTAL SCORE: 2
AUDIT-C TOTAL SCORE: 2
SKIP TO QUESTIONS 9-10: 1
HOW OFTEN DO YOU HAVE 6 OR MORE DRINKS ON ONE OCCASION: NEVER
HOW MANY STANDARD DRINKS CONTAINING ALCOHOL DO YOU HAVE ON A TYPICAL DAY: 1 OR 2
AUDIT-C TOTAL SCORE: 2

## 2024-03-19 ASSESSMENT — ACTIVITIES OF DAILY LIVING (ADL)
GROOMING: INDEPENDENT
PATIENT'S MEMORY ADEQUATE TO SAFELY COMPLETE DAILY ACTIVITIES?: YES
WALKS IN HOME: INDEPENDENT
DRESSING YOURSELF: INDEPENDENT
HEARING - LEFT EAR: FUNCTIONAL
ADEQUATE_TO_COMPLETE_ADL: YES
HEARING - RIGHT EAR: FUNCTIONAL
JUDGMENT_ADEQUATE_SAFELY_COMPLETE_DAILY_ACTIVITIES: YES
TOILETING: INDEPENDENT
LACK_OF_TRANSPORTATION: NO
FEEDING YOURSELF: INDEPENDENT
BATHING: INDEPENDENT

## 2024-03-19 ASSESSMENT — PAIN - FUNCTIONAL ASSESSMENT: PAIN_FUNCTIONAL_ASSESSMENT: 0-10

## 2024-03-19 ASSESSMENT — COLUMBIA-SUICIDE SEVERITY RATING SCALE - C-SSRS
6. HAVE YOU EVER DONE ANYTHING, STARTED TO DO ANYTHING, OR PREPARED TO DO ANYTHING TO END YOUR LIFE?: NO
1. IN THE PAST MONTH, HAVE YOU WISHED YOU WERE DEAD OR WISHED YOU COULD GO TO SLEEP AND NOT WAKE UP?: NO
2. HAVE YOU ACTUALLY HAD ANY THOUGHTS OF KILLING YOURSELF?: NO

## 2024-03-19 ASSESSMENT — HEART SCORE
TROPONIN: LESS THAN OR EQUAL TO NORMAL LIMIT
AGE: 65+
ECG: NORMAL
HISTORY: MODERATELY SUSPICIOUS
HEART SCORE: 4
RISK FACTORS: 1-2 RISK FACTORS

## 2024-03-19 ASSESSMENT — PAIN SCALES - GENERAL: PAINLEVEL_OUTOF10: 0 - NO PAIN

## 2024-03-19 NOTE — PROGRESS NOTES
Pharmacy Medication History Review    Francisco Lizama is a 78 y.o. male admitted for No Principal Problem: There is no principal problem currently on the Problem List. Please update the Problem List and refresh.. Pharmacy reviewed the patient's [id number] medications and allergies for accuracy.    The list below reflectives the updated PTA list. Please review each medication in order reconciliation for additional clarification and justification.  Prior to Admission medications    Medication Sig Start Date End Date Taking? Authorizing Provider        MD Tommy Hardy MD Andres Schuster, MD Roy M Buchinsky, MD Roy M Buchinsky, MD        The list below reflectives the updated allergy list. Please review each documented allergy for additional clarification and justification.  Allergies  Reviewed by PARTH Hinojosa on 3/19/2024   No Known Allergies         Below are additional concerns with the patient's PTA list.  Prior to Admission Medications   Prescriptions Last Dose Informant   ciclopirox (Loprox) 0.77 % cream     Sig: Apply topically 2 times a day. APPLY ON THE SKIN BID 30 gram tube   Patient taking differently: Apply 1 Application topically 2 times a day as needed. APPLY ON THE SKIN BID 30 gram tube   levothyroxine (Synthroid, Levoxyl) 50 mcg tablet 3/19/2024    Sig: Take 1 tablet (50 mcg) by mouth once every day.   rosuvastatin (Crestor) 5 mg tablet 3/19/2024    Sig: Take 1 tablet (5 mg) by mouth once daily at bedtime.   Patient taking differently: Take 1 tablet (5 mg) by mouth once daily.           tamsulosin (Flomax) 0.4 mg 24 hr capsule 3/18/2024    Sig: Take 1 capsule (0.4 mg) by mouth once daily.   Patient taking differently: Take 1 capsule (0.4 mg) by mouth once daily at bedtime.      Facility-Administered Medications: None      Per patient.     Willa Kramer

## 2024-03-19 NOTE — HOSPITAL COURSE
Francisco Lizama is a 78 y.o. male, with a PMH of gout, HLD, hypothyroidism, non-obstructive CAD, BPH, and pAF, who is being admitted under observation status at [facility] on 3/19/2024 for chest pain and shortness of breath.     Patient intitally presented to the ED on 3/19/2024 for c/o chest heaviness and shortness of breath x1.5 months. SOB and chest pain is mainly present with exertion. Patient saw his PCP today and there was concern for left facial droop and SpO2 dropped to 90% while ambulating. For those reasons, patient was referred to the ED.    In the ED, VSS with low HR and EKG SB without ischemic changes. Long hx of bradycardia, prior long distance runner. Neurological exam normal with no facial droop. Labs notable for negative troponin x2, normal d-dimer and BNP, and otherwise unremarkable CMP/CBC. Viral swab pending . CTH without acute intracranial process and CXR unremarkable. Patient was admitted for further evaluation.        Patient is a 78 y.o. male admitted under observation status for chest pain and COVINGTON.    Acute Chest Pain  Shortness of Breath  COVINGTON  Hx of HLD  - VSS, HEART score 4  - Stress test 8/2023: No clinical or diagnostic electrocardiographic evidence for ischemia at a maximal workload   - LHC 1/2020: Non-obstructive CAD  - TTE 1/202: EF 60-65%, no structural abnormalities  - Repeat echo ordered  - Troponin negative x2  - EKG SB with no ischemic changes  - Cards consult  - Load with ASA and start daily regimen, c/w statin  - Monitor tele  - Check HgbA1c, lipid panel, and TSH  - CXR with bibasilar atelectasis  - Follow viral swab results  - Maintain K+ >4 and Mg >2  - Cardiac HH diet    Paroxsymal Atrial Fibrillation  - Current rhythm: SB (baseline HR 40-50s)  - Recent Holter in Jan and Feb with no episodes of atrial fib and no arrhythmias associated with symptoms reported during monitoring  - Does not appear to have ever been on anticoagulation  - Continuous tele  - Keep K+ >4 and Mg  >2     BPH  - Continue home Tamsulosin  - Monitor for retention, bladder scan PRN    Hypothyroidism  - Checking TSH/T4  - c/w home Levothyroxine 50mcg daily      VTE Prophylaxis: SCDs/TEDs, ambulation, SQ Lovenox  Code Status: No Order    Chart, medical history, and labs/testing reviewed in detail.   Disposition: Discharge home once medically cleared, pending cardiology recommendations

## 2024-03-19 NOTE — ED PROVIDER NOTES
HPI   Chief Complaint   Patient presents with    Abnormal ECG       This is a 78-year-old male who presents to the emergency department complaining of chest heaviness and shortness of breath.  The patient reports the symptoms have been present for approximately 1 and half months.  He reports the symptoms with exertion like running and going up steps.  The patient followed up with his primary care physician's office today.  After walking around the office his pulse ox dropped to 90%.  There was also concern that he had left-sided facial drooping.  For these reasons he was sent to the emergency department for evaluation.  The patient reports that the symptoms have been been consistent.  The symptoms improved with rest.  He reports a history of A-fib and has occasionally felt irregular heartbeats.    Past medical history: Hyperlipidemia, BPH, hypothyroid, atrial fibrillation                          No data recorded                   Patient History   Past Medical History:   Diagnosis Date    Lipid disorder     Other specified health status     No pertinent past surgical history    Other specified health status     No pertinent past medical history    Paroxysmal atrial fibrillation (CMS/HCC)     Paroxysmal atrial fibrillation    Personal history of other diseases of male genital organs 07/05/2016    History of scrotal mass    Personal history of other specified conditions 12/12/2017    History of nocturia    Personal history of other specified conditions 07/05/2016    History of shortness of breath    Raynaud's syndrome without gangrene     Raynaud's disease     Past Surgical History:   Procedure Laterality Date    TONSILLECTOMY       Family History   Problem Relation Name Age of Onset    Other (lung fibrosis) Mother      Other (CABG) Father      No Known Problems Brother       Social History     Tobacco Use    Smoking status: Never    Smokeless tobacco: Never   Substance Use Topics    Alcohol use: Yes      Alcohol/week: 1.0 standard drink of alcohol     Types: 1 Standard drinks or equivalent per week    Drug use: Never       Physical Exam   ED Triage Vitals [03/19/24 1038]   Temperature Heart Rate Respirations BP   35.7 °C (96.2 °F) (!) 47 15 135/81      Pulse Ox Temp src Heart Rate Source Patient Position   100 % -- -- --      BP Location FiO2 (%)     -- --       Physical Exam  Vitals and nursing note reviewed.   HENT:      Head: Normocephalic and atraumatic.      Nose: Nose normal.   Eyes:      Conjunctiva/sclera: Conjunctivae normal.   Cardiovascular:      Rate and Rhythm: Normal rate and regular rhythm.      Pulses: Normal pulses.      Heart sounds: Normal heart sounds.   Pulmonary:      Effort: Pulmonary effort is normal.      Breath sounds: Normal breath sounds.   Abdominal:      General: Bowel sounds are normal.      Palpations: Abdomen is soft.   Musculoskeletal:         General: Normal range of motion.      Cervical back: Normal range of motion and neck supple.   Skin:     Findings: No rash.   Neurological:      General: No focal deficit present.      Mental Status: He is alert and oriented to person, place, and time.   Psychiatric:         Mood and Affect: Mood normal.         ED Course & MDM   Diagnoses as of 03/19/24 1539   Chest pain, unspecified type       Medical Decision Making  Differential diagnosis: I have considered the following conditions in my assessment of   this patient's condition:  GERD, musculoskeletal chest pain, aortic dissection, cholecystitis,   ACS, pericarditis, myocarditis, tamponade, shingles,  pneumonia, pneumothorax, pulmonary embolus.    This is a 78-year-old male who presents to the emergency department with chest pain and shortness of breath which has been present for 1 mooney months.  The patient reportedly had facial drooping in the office.  His neurologic exam is normal at the time of his evaluation in the emergency department.  His EKG shows a sinus bradycardia.  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  His EKG has showed bradycardia going back to 2003. Troponin was negative x 2.  He will be further evaluated with an head CT and a D-dimer.  D-dimer was negative at 321.  With low pretest probability this rules out PE.  CT of the head was negative.  The patient's symptoms are typical of angina.  Suggested admission to the hospital for further evaluation and management.  Patient agrees.    Amount and/or Complexity of Data Reviewed  ECG/medicine tests: independent interpretation performed.     Details: Sinus bradycardia, heart rate 46, normal axis, no ST elevations,        Procedure  Procedures     Finesse Joel MD  03/19/24 5100

## 2024-03-19 NOTE — H&P
History Of Present Illness  Francisco Lizama is a 78 y.o. male, with a PMH of gout, HLD, hypothyroidism, non-obstructive CAD, BPH, and pAF, who is being admitted under observation status at [facility] on 3/19/2024 for chest pain and shortness of breath.     Patient intitally presented to the ED on 3/19/2024 for c/o chest heaviness and shortness of breath x1.5 months. SOB and chest pain is mainly present with exertion. Patient saw his PCP today and there was concern for left facial droop and SpO2 dropped to 90% while ambulating. For those reasons, patient was referred to the ED.    In the ED, VSS with low HR and EKG SB without ischemic changes. Long hx of bradycardia, prior long distance runner. Neurological exam normal with no facial droop- does have a crooked smile which is not new per patient and family. Labs notable for negative troponin x2, normal d-dimer and BNP, and otherwise unremarkable CMP/CBC. Viral swab pending . CTH without acute intracranial process and CXR unremarkable. Patient was admitted for further evaluation.     Past Medical History  He has a past medical history of Acquired hypothyroidism, BPH (benign prostatic hyperplasia), Gout, Mixed hyperlipidemia, Paroxysmal atrial fibrillation (CMS/HCC), Raynaud's syndrome without gangrene, and Scrotal mass.    Surgical History  He has a past surgical history that includes Tonsillectomy.     Social History  He reports that he has never smoked. He has never used smokeless tobacco. He reports current alcohol use of about 1.0 standard drink of alcohol per week. He reports that he does not use drugs.    Family History  Family History   Problem Relation Name Age of Onset    Other (lung fibrosis) Mother      Other (CABG) Father      No Known Problems Brother          Allergies  Patient has no known allergies.    Review of Systems     Physical Exam     Last Recorded Vitals  /58   Pulse (!) 47   Temp 35.7 °C (96.2 °F)   Resp 13   Wt 72.6 kg (160 lb)    SpO2 96%     Relevant Results  Scheduled medications  aspirin, 324 mg, oral, Once  [START ON 3/20/2024] aspirin, 81 mg, oral, Daily  enoxaparin, 40 mg, subcutaneous, q24h  [START ON 3/20/2024] levothyroxine, 50 mcg, oral, Daily  [START ON 3/20/2024] pantoprazole, 40 mg, oral, Daily before breakfast  [START ON 3/20/2024] polyethylene glycol, 17 g, oral, Daily  [START ON 3/20/2024] rosuvastatin, 5 mg, oral, Daily  tamsulosin, 0.4 mg, oral, Nightly      Continuous medications     PRN medications  PRN medications: acetaminophen, melatonin, ondansetron  Results for orders placed or performed during the hospital encounter of 03/19/24 (from the past 24 hour(s))   ECG 12 lead   Result Value Ref Range    Ventricular Rate 46 BPM    Atrial Rate 46 BPM    AL Interval 202 ms    QRS Duration 88 ms    QT Interval 418 ms    QTC Calculation(Bazett) 365 ms    P Axis 58 degrees    R Axis 46 degrees    T Axis 36 degrees    QRS Count 8 beats    Q Onset 221 ms    P Onset 120 ms    P Offset 176 ms    T Offset 430 ms    QTC Fredericia 382 ms   CBC with Differential   Result Value Ref Range    WBC 6.6 4.4 - 11.3 x10*3/uL    nRBC 0.0 0.0 - 0.0 /100 WBCs    RBC 5.35 4.50 - 5.90 x10*6/uL    Hemoglobin 16.2 13.5 - 17.5 g/dL    Hematocrit 47.6 41.0 - 52.0 %    MCV 89 80 - 100 fL    MCH 30.3 26.0 - 34.0 pg    MCHC 34.0 32.0 - 36.0 g/dL    RDW 12.3 11.5 - 14.5 %    Platelets 174 150 - 450 x10*3/uL    Neutrophils % 60.2 40.0 - 80.0 %    Immature Granulocytes %, Automated 0.3 0.0 - 0.9 %    Lymphocytes % 27.0 13.0 - 44.0 %    Monocytes % 9.3 2.0 - 10.0 %    Eosinophils % 2.6 0.0 - 6.0 %    Basophils % 0.6 0.0 - 2.0 %    Neutrophils Absolute 4.00 1.60 - 5.50 x10*3/uL    Immature Granulocytes Absolute, Automated 0.02 0.00 - 0.50 x10*3/uL    Lymphocytes Absolute 1.79 0.80 - 3.00 x10*3/uL    Monocytes Absolute 0.62 0.05 - 0.80 x10*3/uL    Eosinophils Absolute 0.17 0.00 - 0.40 x10*3/uL    Basophils Absolute 0.04 0.00 - 0.10 x10*3/uL   Comprehensive  Metabolic Panel   Result Value Ref Range    Glucose 85 74 - 99 mg/dL    Sodium 139 136 - 145 mmol/L    Potassium 4.2 3.5 - 5.3 mmol/L    Chloride 103 98 - 107 mmol/L    Bicarbonate 28 21 - 32 mmol/L    Anion Gap 12 10 - 20 mmol/L    Urea Nitrogen 17 6 - 23 mg/dL    Creatinine 1.00 0.50 - 1.30 mg/dL    eGFR 78 >60 mL/min/1.73m*2    Calcium 9.7 8.6 - 10.3 mg/dL    Albumin 4.9 3.4 - 5.0 g/dL    Alkaline Phosphatase 70 33 - 136 U/L    Total Protein 7.1 6.4 - 8.2 g/dL    AST 18 9 - 39 U/L    Bilirubin, Total 1.4 (H) 0.0 - 1.2 mg/dL    ALT 15 10 - 52 U/L   Troponin I, High Sensitivity, Initial   Result Value Ref Range    Troponin I, High Sensitivity 3 0 - 20 ng/L   B-type natriuretic peptide   Result Value Ref Range    BNP 35 0 - 99 pg/mL   Troponin, High Sensitivity, 1 Hour   Result Value Ref Range    Troponin I, High Sensitivity 4 0 - 20 ng/L   C-reactive protein   Result Value Ref Range    C-Reactive Protein 0.13 <1.00 mg/dL   D-dimer, VTE Exclusion   Result Value Ref Range    D-Dimer, Quantitative VTE Exclusion 321 <=500 ng/mL FEU     XR chest 1 view    Result Date: 3/19/2024  STUDY: Chest Radiograph;  3/19/2024, 4:33PM INDICATION: Shortness of breath. COMPARISON: 1/19/2020 XR Chest ACCESSION NUMBER(S): [id number] ORDERING CLINICIAN: SOLOMON GARVIN TECHNIQUE:  Frontal chest was obtained at 16:33 hours. FINDINGS: CARDIOMEDIASTINAL SILHOUETTE: Cardiomediastinal silhouette is normal in size and configuration.  LUNGS: Minimal linear areas of atelectasis or scarring in the lung bases  ABDOMEN: No remarkable upper abdominal findings.  BONES: No acute osseous changes.    Minimal linear areas of atelectasis or scarring in the lung bases. Signed by Oniel Alejandro MD    CT head wo IV contrast    Result Date: 3/19/2024  Interpreted By:  Dilia Beckett, STUDY: CT HEAD WO IV CONTRAST;  3/19/2024 1:51 pm   INDICATION: Signs/Symptoms:facial droop.   COMPARISON: None.   ACCESSION NUMBER(S): [id number]   ORDERING CLINICIAN:  BRANDIN ELLIS   TECHNIQUE: Unenhanced CT images of the head were obtained.   FINDINGS: The ventricles, cisterns and sulci are prominent, consistent with diffuse volume loss. There are areas of nonspecific white matter hypodensity, which are probably age related or microvascular in nature.   There is no acute intracranial hemorrhage, mass effect or midline shift. No extraaxial fluid collection.   No focal calvarial lesion. 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   Low attenuation nodular probable mucous retention cyst in the inferior left maxillary sinus. Remaining visualized paranasal sinuses are clear.       No acute intracranial hemorrhage or mass-effect.   Probable mucous retention cyst in the left maxillary sinus.   MACRO: None.   Signed by: Dilia Beckett 3/19/2024 2:24 PM Dictation workstation:   [id number]    ECG 12 lead    Result Date: 3/19/2024  Sinus bradycardia Otherwise normal ECG When compared with ECG of 11-AUG-2023 14:48, No significant change was found See ED provider note for full interpretation and clinical correlation Confirmed by Nelia Sparrow ([id number]) on 3/19/2024 11:50:11 AM     Assessment/Plan   Principal Problem:    Other chest pain  Active Problems:    Elevated coronary artery calcium score    Idiopathic chronic gout of multiple sites without tophus    Mixed hyperlipidemia    Acquired hypothyroidism    Mild vitamin D deficiency    Benign prostatic hyperplasia with weak urinary stream    Paroxysmal atrial fibrillation (CMS/HCC)    Shortness of breath      Patient is a 78 y.o. male admitted under observation status for chest pain and COVINGTON.    Acute Chest Pain  Shortness of Breath  COV[place]  Hx of HLD  - VSS, HEART score 4  - Stress test 8/2023: No clinical or diagnostic electrocardiographic evidence for ischemia at a maximal workload   - [facility] 1/2020: Non-obstructive CAD  - TTE 1/202: EF 60-65%, no structural abnormalities  -  Repeat echo ordered  - Troponin negative x2  - EKG SB with no ischemic changes  - Cards consult  - NPO after midnight until seen by Cardiology  - Load with ASA and start daily regimen, c/w statin  - Monitor tele  - Check HgbA1c, lipid panel, and TSH  - CXR with bibasilar atelectasis  - Follow viral swab results  - Maintain K+ >4 and Mg >2  - Cardiac HH diet    Paroxsymal Atrial Fibrillation  - Current rhythm: SB (baseline HR 40-50s)  - Recent Holter in Jan and Feb with no episodes of atrial fib and no arrhythmias associated with symptoms reported during monitoring  - Does not appear to have ever been on anticoagulation  - Continuous tele  - Keep K+ >4 and Mg >2     BPH  - Continue home Tamsulosin  - Monitor for retention, bladder scan PRN    Hypothyroidism  - Checking TSH/T4  - c/w home Levothyroxine 50mcg daily    VTE Prophylaxis: SCDs/TEDs, ambulation, SQ Lovenox  Code Status: No Order    Chart, medical history, and labs/testing reviewed in detail.   Disposition: Discharge home once medically cleared, pending cardiology recommendations     CHERRY Youngblood-CNP

## 2024-03-19 NOTE — PROGRESS NOTES
Primary Care Provider: Tommy Corcoran MD    Subjective   Francisco Lizama is a 78 y.o. male who presents for Nasal Congestion, Night Sweats, Shortness of Breath (On movement), Joint Pain, and Epistaxis (Nose Bleed).    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 Pertinent negatives include no abdominal pain, ear pain, fever, leg swelling, neck pain, rash, rhinorrhea, sore throat or wheezing.   Epistaxis (Nose Bleed)          Review of Systems   Constitutional:  Positive for fatigue. Negative for activity change, appetite change, chills, diaphoresis, fever and unexpected weight change.   HENT:  Positive for nosebleeds. Negative for congestion, dental problem, ear discharge, ear pain, hearing loss, mouth sores, postnasal drip, rhinorrhea, sinus pressure, sneezing, sore throat, tinnitus, trouble swallowing and voice change.    Eyes: Negative.  Negative for photophobia, discharge and visual disturbance.   Respiratory:  Positive for chest tightness and shortness of breath. Negative for apnea, cough, wheezing and stridor.    Cardiovascular:  Positive for chest pain. Negative for palpitations and leg swelling.   Gastrointestinal:  Positive  for diarrhea. Negative for abdominal distention, abdominal pain, anal bleeding, blood in stool, constipation, nausea and rectal pain.   Endocrine: Negative.  Negative for cold intolerance, heat intolerance, polydipsia, polyphagia and polyuria.   Genitourinary: Negative.  Negative for decreased urine volume, difficulty urinating, dysuria, flank pain, frequency, hematuria and urgency.   Musculoskeletal:  Positive for arthralgias. Negative for back pain, gait problem, joint swelling, myalgias, neck pain and neck stiffness.   Skin: Negative.  Negative for color change and rash.   Neurological:  Positive for facial asymmetry and headaches. Negative for dizziness, tremors, seizures, syncope, speech difficulty, weakness, light-headedness and numbness.   Hematological: Negative.  Does not bruise/bleed easily.   Psychiatric/Behavioral: Negative.  Negative for agitation, confusion, dysphoric mood, sleep disturbance and suicidal ideas. The patient is not nervous/anxious and is not hyperactive.    All other systems reviewed and are negative.        Objective   /67 (BP Location: Right arm, Patient Position: Sitting, BP Cuff Size: Adult)   Pulse (!) 47   Temp 36.4 °C (97.5 °F) (Oral)   Resp 16   Wt 72.6 kg (160 lb)   SpO2 96%   BMI 24.33 kg/m²     3minute in office walk test: SpO2 90% and HR 66; SOB after walking      Physical Exam  Vitals reviewed.   Constitutional:       General: He is not in acute distress.     Appearance: Normal appearance. He is normal weight. He is not ill-appearing, toxic-appearing or diaphoretic.   HENT:      Head: Normocephalic and atraumatic.      Nose: Nose normal.   Eyes:      Extraocular Movements: Extraocular movements intact.      Conjunctiva/sclera: Conjunctivae normal.      Pupils: Pupils are equal, round, and reactive to light.   Neck:      Vascular: No carotid bruit.   Cardiovascular:      Rate and Rhythm: Bradycardia present.      Pulses: Normal pulses.      Heart sounds: Normal  heart sounds. No murmur heard.     No friction rub. No gallop.   Pulmonary:      Effort: Pulmonary effort is normal. No respiratory distress.      Breath sounds: Normal breath sounds.   Abdominal:      General: Abdomen is flat. Bowel sounds are normal.      Palpations: Abdomen is soft.   Musculoskeletal:         General: Normal range of motion.      Cervical back: Normal range of motion and neck supple.   Lymphadenopathy:      Cervical: No cervical adenopathy.   Skin:     General: Skin is warm and dry.      Capillary Refill: Capillary refill takes less than 2 seconds.   Neurological:      Mental Status: He is alert and oriented to person, place, and time. Mental status is at baseline.      Comments: Left facial droop   Psychiatric:         Mood and Affect: Mood normal.         Behavior: Behavior normal.         Thought Content: Thought content normal.         Judgment: Judgment normal.         Assessment/Plan   Problem List Items Addressed This Visit    Sick visit  Visit Diagnoses       Chest pain, unspecified type    -  Primary    Relevant Orders    ECG 12 lead (Clinic Performed) (Completed)    RSV PCR    Influenza A, and B PCR    SOB (shortness of breath) on exertion        Orthopnea        Facial droop            Sick visit  Has been going on for 1.5-2 months/ about end of January  Nasal congestion, feeling sick, feels like the front of his head is hot, will get chest pain with going up stairs and SOB; Chest tightness with going upstairs and jogging; none with walking on flat surface; Feels like a tightness in his chest and when he lays flat he feels short of breath/ orthopnea, Diarrhea for a few weeks, Gets really hot at night  3minute in office walk test: SpO2 90% and HR 66; SOB after walking    Rule out Afib/other arrhythmia, PE, Stroke, HF, other.    Sent to ER; follow up after ER/ hospital visit

## 2024-03-20 ENCOUNTER — APPOINTMENT (OUTPATIENT)
Dept: CARDIOLOGY | Facility: HOSPITAL | Age: 79
End: 2024-03-20
Payer: MEDICARE

## 2024-03-20 VITALS
WEIGHT: 160 LBS | TEMPERATURE: 96.3 F | DIASTOLIC BLOOD PRESSURE: 82 MMHG | HEART RATE: 58 BPM | BODY MASS INDEX: 25.71 KG/M2 | RESPIRATION RATE: 16 BRPM | SYSTOLIC BLOOD PRESSURE: 129 MMHG | OXYGEN SATURATION: 97 % | HEIGHT: 66 IN

## 2024-03-20 LAB
ANION GAP SERPL CALC-SCNC: 11 MMOL/L (ref 10–20)
AORTIC VALVE MEAN GRADIENT: 5.9 MMHG
AORTIC VALVE PEAK VELOCITY: 1.74 M/S
AV PEAK GRADIENT: 12.1 MMHG
AVA (PEAK VEL): 2.71 CM2
AVA (VTI): 2.77 CM2
BUN SERPL-MCNC: 15 MG/DL (ref 6–23)
CALCIUM SERPL-MCNC: 9 MG/DL (ref 8.6–10.3)
CHLORIDE SERPL-SCNC: 105 MMOL/L (ref 98–107)
CHOLEST SERPL-MCNC: 142 MG/DL (ref 0–199)
CHOLESTEROL/HDL RATIO: 3.8
CO2 SERPL-SCNC: 27 MMOL/L (ref 21–32)
CREAT SERPL-MCNC: 1.02 MG/DL (ref 0.5–1.3)
EGFRCR SERPLBLD CKD-EPI 2021: 76 ML/MIN/1.73M*2
EJECTION FRACTION APICAL 4 CHAMBER: 69.2
EJECTION FRACTION: 70 %
ERYTHROCYTE [DISTWIDTH] IN BLOOD BY AUTOMATED COUNT: 12.3 % (ref 11.5–14.5)
EST. AVERAGE GLUCOSE BLD GHB EST-MCNC: 123 MG/DL
FLUAV RNA RESP QL NAA+PROBE: NOT DETECTED
FLUBV RNA RESP QL NAA+PROBE: NOT DETECTED
GLUCOSE SERPL-MCNC: 98 MG/DL (ref 74–99)
HBA1C MFR BLD: 5.9 %
HCT VFR BLD AUTO: 42.2 % (ref 41–52)
HDLC SERPL-MCNC: 37.4 MG/DL
HGB BLD-MCNC: 14.1 G/DL (ref 13.5–17.5)
LDLC SERPL CALC-MCNC: 75 MG/DL
LEFT ATRIUM VOLUME AREA LENGTH INDEX BSA: 35.4 ML/M2
LEFT VENTRICLE INTERNAL DIMENSION DIASTOLE: 3.75 CM (ref 3.5–6)
LEFT VENTRICULAR OUTFLOW TRACT DIAMETER: 2 CM
MAGNESIUM SERPL-MCNC: 2 MG/DL (ref 1.6–2.4)
MCH RBC QN AUTO: 29.5 PG (ref 26–34)
MCHC RBC AUTO-ENTMCNC: 33.4 G/DL (ref 32–36)
MCV RBC AUTO: 88 FL (ref 80–100)
MITRAL VALVE E/A RATIO: 0.99
NON HDL CHOLESTEROL: 105 MG/DL (ref 0–149)
NRBC BLD-RTO: 0 /100 WBCS (ref 0–0)
PLATELET # BLD AUTO: 169 X10*3/UL (ref 150–450)
POTASSIUM SERPL-SCNC: 4.4 MMOL/L (ref 3.5–5.3)
RBC # BLD AUTO: 4.78 X10*6/UL (ref 4.5–5.9)
RIGHT VENTRICLE FREE WALL PEAK S': 12 CM/S
RIGHT VENTRICLE PEAK SYSTOLIC PRESSURE: 20.5 MMHG
RSV RNA RESP QL NAA+PROBE: NOT DETECTED
SARS-COV-2 RNA RESP QL NAA+PROBE: NOT DETECTED
SODIUM SERPL-SCNC: 139 MMOL/L (ref 136–145)
TRICUSPID ANNULAR PLANE SYSTOLIC EXCURSION: 2.8 CM
TRIGL SERPL-MCNC: 147 MG/DL (ref 0–149)
VLDL: 29 MG/DL (ref 0–40)
WBC # BLD AUTO: 5.3 X10*3/UL (ref 4.4–11.3)

## 2024-03-20 PROCEDURE — 93306 TTE W/DOPPLER COMPLETE: CPT | Performed by: STUDENT IN AN ORGANIZED HEALTH CARE EDUCATION/TRAINING PROGRAM

## 2024-03-20 PROCEDURE — G0378 HOSPITAL OBSERVATION PER HR: HCPCS

## 2024-03-20 PROCEDURE — 80061 LIPID PANEL: CPT | Performed by: NURSE PRACTITIONER

## 2024-03-20 PROCEDURE — 93306 TTE W/DOPPLER COMPLETE: CPT

## 2024-03-20 PROCEDURE — 36415 COLL VENOUS BLD VENIPUNCTURE: CPT | Performed by: NURSE PRACTITIONER

## 2024-03-20 PROCEDURE — 2500000001 HC RX 250 WO HCPCS SELF ADMINISTERED DRUGS (ALT 637 FOR MEDICARE OP): Performed by: NURSE PRACTITIONER

## 2024-03-20 PROCEDURE — 99231 SBSQ HOSP IP/OBS SF/LOW 25: CPT | Performed by: NURSE PRACTITIONER

## 2024-03-20 PROCEDURE — 83735 ASSAY OF MAGNESIUM: CPT | Performed by: NURSE PRACTITIONER

## 2024-03-20 PROCEDURE — 85027 COMPLETE CBC AUTOMATED: CPT | Performed by: NURSE PRACTITIONER

## 2024-03-20 PROCEDURE — 80048 BASIC METABOLIC PNL TOTAL CA: CPT | Performed by: NURSE PRACTITIONER

## 2024-03-20 PROCEDURE — 99223 1ST HOSP IP/OBS HIGH 75: CPT | Performed by: INTERNAL MEDICINE

## 2024-03-20 RX ORDER — NAPROXEN SODIUM 220 MG/1
81 TABLET, FILM COATED ORAL DAILY
COMMUNITY
Start: 2024-03-21

## 2024-03-20 RX ADMIN — LEVOTHYROXINE SODIUM 50 MCG: 50 TABLET ORAL at 08:48

## 2024-03-20 RX ADMIN — ASPIRIN 81 MG CHEWABLE TABLET 81 MG: 81 TABLET CHEWABLE at 08:48

## 2024-03-20 ASSESSMENT — COGNITIVE AND FUNCTIONAL STATUS - GENERAL
DAILY ACTIVITIY SCORE: 24
MOBILITY SCORE: 24
MOBILITY SCORE: 24
PATIENT BASELINE BEDBOUND: NO
DAILY ACTIVITIY SCORE: 24

## 2024-03-20 ASSESSMENT — PAIN - FUNCTIONAL ASSESSMENT
PAIN_FUNCTIONAL_ASSESSMENT: 0-10

## 2024-03-20 ASSESSMENT — ACTIVITIES OF DAILY LIVING (ADL): LACK_OF_TRANSPORTATION: NO

## 2024-03-20 ASSESSMENT — PAIN SCALES - GENERAL
PAINLEVEL_OUTOF10: 0 - NO PAIN

## 2024-03-20 NOTE — DISCHARGE SUMMARY
Discharge Diagnosis  Other chest pain    Issues Requiring Follow-Up  Follow up with PCP and Cardiology     Discharge Meds     Your medication list        START taking these medications        Instructions Last Dose Given Next Dose Due   aspirin 81 mg chewable tablet  Start taking on: March 21, 2024      Chew 1 tablet (81 mg) once daily. Do not start before March 21, 2024.              CHANGE how you take these medications        Instructions Last Dose Given Next Dose Due   rosuvastatin 5 mg tablet  Commonly known as: Crestor  What changed: when to take this      Take 1 tablet (5 mg) by mouth once daily at bedtime.       tamsulosin 0.4 mg 24 hr capsule  Commonly known as: Flomax  What changed: when to take this      Take 1 capsule (0.4 mg) by mouth once daily.              CONTINUE taking these medications        Instructions Last Dose Given Next Dose Due   ciclopirox 0.77 % cream  Commonly known as: Loprox      Apply topically 2 times a day. APPLY ON THE SKIN BID 30 gram tube       levothyroxine 50 mcg tablet  Commonly known as: Synthroid, Levoxyl      Take 1 tablet (50 mcg) by mouth once every day.                 Where to Get Your Medications        You can get these medications from any pharmacy    You don't need a prescription for these medications  aspirin 81 mg chewable tablet         Test Results Pending At Discharge  Pending Labs       No current pending labs.            Hospital Course  Francisco Lizama is a 78 y.o. male, with a PMH of gout, HLD, hypothyroidism, non-obstructive CAD, BPH, and pAF, who is being admitted under observation status at [facility] on 3/19/2024 for chest pain and shortness of breath.      Patient intitally presented to the ED on 3/19/2024 for c/o chest heaviness and shortness of breath x several months. SOB and chest pain is mainly present with exertion. Patient saw his PCP today and there was concern for left facial droop and SpO2 dropped to 90% while ambulating. For those  reasons, patient was referred to the ED.     In the ED, VSS with low HR and EKG SB without ischemic changes. Long hx of bradycardia, prior long distance runner. Neurological exam normal with no facial droop- does have a crooked smile which is not new per patient and family. Labs notable for negative troponin x2, normal d-dimer and BNP, and otherwise unremarkable CMP/CBC. Viral swab pending . CTH without acute intracranial process and CXR unremarkable. Patient was admitted for further evaluation.    Chest Pain  Shortness of Breath  - chest pain and shortness of breath has been present for several months with extensive previous evaluation in the outpatient setting, etiology still unclear  - seen and evaluated by cardiology   - echo is unchanged from prior   - patient should follow up with his cardiologist, and with consideration for referral to dyspnea clinic if symptoms persist or worsen      Paroxsymal Atrial Fibrillation  - remained sinus rhythm/bradycardia on tele   - Recent Holter in Jan and Feb with no episodes of atrial fib and no arrhythmias associated with symptoms reported during monitoring  - possible atrial fibrillation noted on apple watch- discussed with cardiology, likely artifact, not truly atrial fibrillation.  Can consider loop recorder as outpatient if concern persists.  No indication for AC at this time     BPH  - Continue home Tamsulosin  - Monitor for retention, bladder scan PRN     Hypothyroidism  - TSH within normal limits  - c/w home Levothyroxine 50mcg daily     VTE Prophylaxis: SCDs/TEDs, ambulation, SQ Lovenox    Disposition: Patient remained HDS without symptoms throughout his hospital stay.  He was seen and cleared by cardiology and is medically stable for discharge home today.  He will follow up with his PCP and cardiology (appointments requested)    Discussed case and discharge with Dr. Cat.     Pertinent Physical Exam At Time of Discharge  Physical Exam    Constitutional: NAD, pt  alert and cooperative  Eyes: no icterus  ENMT: mucous membranes moist, no lesions seen  Head/Neck: Neck supple  Respiratory/Thorax: CTA bilaterally, non-labored breathing, no cough, on RA  Cardiovascular: Regular rate and rhythm, no murmurs heard  Gastrointestinal: Nondistended, soft, non-tender, BS present x 4  : no Marcus, no SP discomfort  Musculoskeletal: ROM intact, no joint swelling  Extremities: normal extremities, no edema  Neurological: A&O x 3, speech clear, follows commands appropriately, cr. n. grossly intact, sensation grossly intact, motor 5/5 throughout  Skin: Warm and dry, no lesions, no rashes  Psych: calm, stable mood     Outpatient Follow-Up  Future Appointments   Date Time Provider Department Center   1/8/2025  3:00 PM Linda Baires MD [id number]         Geovanna López, APRN-CNP

## 2024-03-20 NOTE — CARE PLAN
The patient's goals for the shift include      The clinical goals for the shift include      Problem: Pain  Goal: My pain/discomfort is manageable  Outcome: Progressing     Problem: Safety  Goal: Patient will be injury free during hospitalization  Outcome: Progressing  Goal: I will remain free of falls  Outcome: Progressing     Problem: Daily Care  Goal: Daily care needs are met  Outcome: Progressing     Problem: Psychosocial Needs  Goal: Demonstrates ability to cope with hospitalization/illness  Outcome: Progressing  Goal: Collaborate with me, my family, and caregiver to identify my specific goals  Outcome: Progressing     Problem: Discharge Barriers  Goal: My discharge needs are met  Outcome: Progressing

## 2024-03-20 NOTE — PROGRESS NOTES
03/20/24 1005   Current Planned Discharge Disposition   Current Planned Discharge Disposition Home

## 2024-03-20 NOTE — CONSULTS
"Inpatient consult to Cardiology  Consult performed by: PARTH Angeles  Consult ordered by: PARTH Alvarez  Reason for consult: chest pain, COVINGTON, heart score 4  Assessment/Recommendations: Chest pain and dyspnea that has been present for several years with extensive evaluation in the outpatient setting.  Symptoms do not appear unchanged.  He does report being told by Apple watch that he has atrial fibrillation however no atrial fibrillation has been documented on event monitors x 2 nor on any in office, hospital EKGs  --If atrial fibrillation remains a concern, then would consider implantable loop recorder after discussion with primary cardiologist.  As of this time, we do not have empiric evidence and would hold off on anticoagulation  --Regarding his chest discomfort, shortness of breath.  Again, this has been extensively evaluated in the outpatient setting and remains difficult to elucidate a cause.  We will check an echocardiogram and provided no untoward findings, he can follow-up in the outpatient setting with his primary cardiologist.  If this continues to be an issue, consideration for dyspnea clinic can be discussed with his primary cardiologist.        Karla; Yogi  History Of Present Illness:    Francisco Lizama is a 78 y.o. male presenting with past medical history of hyperlipidemia, nonobstructive CAD, BPH comes to [facility] with chest pain and shortness of breath.  Patient was given a heart score 4 hence cardiology consult was placed for \"chest pain, COVINGTON, heart score 4\"      Per the patient he had a scheduled appointment with his PCP yesterday.  Claims he was sent to the emergency room from his PCP office for noted new facial droop and complaints of chest tightness and pain.  Patient says for the last couple months has been complaining about chest tightness and dyspnea that occurs on exertion.  He also explains he has times of lightheadedness as well.  Patient had a recent cardiac " exercise stress test in August which was negative and recent Holter monitor did not show any new arrhythmias.  He denies shortness of breath at rest, denies chest pain at rest, endorses lightheadedness from time to time, endorses dyspnea on exertion, denies nausea, denies vomiting, endorses taking all his medications.      Afebrile, heart rate 50, blood pressure 117/60, 94% on room air.  Notable labs on admission BUNs/CR 15/1.02, high sensitive troponin negative, BNP 35, TSH 3.14, D-dimer 321, H&H 14.1/42.2, CT of the head was nonacute, respiratory viral panel is pending, chest x-ray is nonacute, EKG shows sinus bradycardia rates in the 50s no acute signs of ischemia.  Patient was made n.p.o. after midnight and started on aspirin.    Home cardiology meds include Crestor 5 mg daily.    Past Cardiology Tests (Last 3 Years):    Holter 2 weeks 2/5/2024: The predominant rhythm was sinus;  Heart rate was 154 minimum heart rate was 36 average heart rate was 57, 48 VE beats with a burden of less than 1%.  1 occurrence of ventricular tachycardia with the fastest episode at 120 bpm with the longest episode of 5 beats.  There were 17 occurrences of SVT with the fastest beat 154 longest episode 15 beats.  There were 5 patient triggers.    Cardiac exercise Stress Test 8/11/20231. No clinical or diagnostic electrocardiographic evidence for ischemia at a maximal workload. 2. Adequate level of stress achieved.    Echocardiogram from January 20, 2020: 1. The left ventricular systolic function is normal with a 60-65% estimated ejection fraction. 2. RVSP within normal limits.     Cardiac catheterization from January 20, 2020: 1. Nonobstructive CAD in a right dominant system. 2. Mildly elevated LVEDP. 3. No aortic stenosis. The LVEDP was around 18 mmHg.      Past Medical History:  He has a past medical history of Acquired hypothyroidism, BPH (benign prostatic hyperplasia), Gout, Mixed hyperlipidemia, Paroxysmal atrial fibrillation  (CMS/HCC), Raynaud's syndrome without gangrene, and Scrotal mass.    Past Surgical History:  He has a past surgical history that includes Tonsillectomy.      Social History:  He reports that he has never smoked. He has never used smokeless tobacco. He reports current alcohol use of about 1.0 standard drink of alcohol per week. He reports that he does not use drugs.    Family History:  Family History   Problem Relation Name Age of Onset    Other (lung fibrosis) Mother      Other (CABG) Father      No Known Problems Brother          Allergies:  Patient has no known allergies.    ROS:  10 point review of systems including (Constitutional, Eyes, ENMT, Respiratory, Cardiac, Gastrointestinal, Neurological, Psychiatric, and Hematologic) was performed and is otherwise negative.    Objective Data:  Last Recorded Vitals:  Vitals:    24 1857 24 1938 24 0018 24 0410   BP: 160/82 133/68 133/69 117/60   BP Location: Right arm      Patient Position: Sitting Lying     Pulse: 52 54 50 50   Resp: 17 17 17 17   Temp: 36.4 °C (97.5 °F) 36.7 °C (98.1 °F) 36.9 °C (98.4 °F) 35.7 °C (96.3 °F)   TempSrc: Temporal Temporal Temporal Temporal   SpO2: 97% 97% 95% 94%   Weight:       Height:         Medical Gas Therapy: None (Room air)  Weight  Av.6 kg (160 lb)  Min: 72.6 kg (160 lb)  Max: 72.6 kg (160 lb)      LABS:  CMP:  Results from last 7 days   Lab Units 24  0433 24  1215 24  1050   SODIUM mmol/L 139  --  139   POTASSIUM mmol/L 4.4  --  4.2   CHLORIDE mmol/L 105  --  103   CO2 mmol/L 27  --  28   ANION GAP mmol/L 11  --  12   BUN mg/dL 15  --  17   CREATININE mg/dL 1.02  --  1.00   EGFR mL/min/1.73m*2 76  --  78   MAGNESIUM mg/dL 2.00 2.20  --    ALBUMIN g/dL  --   --  4.9   ALT U/L  --   --  15   AST U/L  --   --  18   BILIRUBIN TOTAL mg/dL  --   --  1.4*     CBC:  Results from last 7 days   Lab Units 24  0433 24  1050   WBC AUTO x10*3/uL 5.3 6.6   HEMOGLOBIN g/dL 14.1 16.2  "  HEMATOCRIT % 42.2 47.6   PLATELETS AUTO x10*3/uL 169 174   MCV fL 88 89     COAG:     ABO: No results found for: \"ABO\"  HEME/ENDO:  Results from last 7 days   Lab Units 03/19/24  1215 03/19/24  1050   TSH mIU/L 3.14  --    HEMOGLOBIN A1C %  --  5.9*      CARDIAC:   Results from last 7 days   Lab Units 03/19/24  1215 03/19/24  1050   TROPHS ng/L 4 3   BNP pg/mL  --  35      Results from last 7 days   Lab Units 03/20/24  0433 03/19/24  1050   HEMOGLOBIN A1C %  --  5.9*   LDL CALC mg/dL 75  --    VLDL mg/dL 29  --    CHOLESTEROL/HDL RATIO  3.8  --         Last I/O:  No intake or output data in the 24 hours ending 03/20/24 0809  Net IO Since Admission: No IO data has been entered for this period [03/20/24 0809]      Imaging Results:  XR chest 1 view    Result Date: 3/19/2024  STUDY: Chest Radiograph;  3/19/2024, 4:33PM INDICATION: Shortness of breath. COMPARISON: 1/19/2020 XR Chest ACCESSION NUMBER(S): [id number] ORDERING CLINICIAN: SOLOMON GARVIN TECHNIQUE:  Frontal chest was obtained at 16:33 hours. FINDINGS: CARDIOMEDIASTINAL SILHOUETTE: Cardiomediastinal silhouette is normal in size and configuration.  LUNGS: Minimal linear areas of atelectasis or scarring in the lung bases  ABDOMEN: No remarkable upper abdominal findings.  BONES: No acute osseous changes.    Minimal linear areas of atelectasis or scarring in the lung bases. Signed by Oniel Alejandro MD    CT head wo IV contrast    Result Date: 3/19/2024  Interpreted By:  Dilia Beckett, STUDY: CT HEAD WO IV CONTRAST;  3/19/2024 1:51 pm   INDICATION: Signs/Symptoms:facial droop.   COMPARISON: None.   ACCESSION NUMBER(S): [id number]   ORDERING CLINICIAN: BRANDIN ELLIS   TECHNIQUE: Unenhanced CT images of the head were obtained.   FINDINGS: The ventricles, cisterns and sulci are prominent, consistent with diffuse volume loss. There are areas of nonspecific white matter hypodensity, which are probably age related or microvascular in nature.   There is no acute " intracranial hemorrhage, mass effect or midline shift. No extraaxial fluid collection.   No focal calvarial lesion. 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   Low attenuation nodular probable mucous retention cyst in the inferior left maxillary sinus. Remaining visualized paranasal sinuses are clear.       No acute intracranial hemorrhage or mass-effect.   Probable mucous retention cyst in the left maxillary sinus.   MACRO: None.   Signed by: Dilia Beckett 3/19/2024 2:24 PM Dictation workstation:   [id number]    ECG 12 lead    Result Date: 3/19/2024  Sinus bradycardia Otherwise normal ECG When compared with ECG of 11-AUG-2023 14:48, No significant change was found See ED provider note for full interpretation and clinical correlation Confirmed by Nelia Sparrow ([id number]) on 3/19/2024 11:50:11 AM      Inpatient Medications:  Scheduled medications   Medication Dose Route Frequency    aspirin  81 mg oral Daily    enoxaparin  40 mg subcutaneous q24h    levothyroxine  50 mcg oral Daily    pantoprazole  40 mg oral Daily before breakfast    polyethylene glycol  17 g oral Daily    rosuvastatin  5 mg oral Daily    tamsulosin  0.4 mg oral Nightly     PRN medications   Medication    acetaminophen    melatonin    ondansetron     Continuous Medications   Medication Dose Last Rate       Outpatient Medications:  Current Outpatient Medications   Medication Instructions    ciclopirox (Loprox) 0.77 % cream Topical, 2 times daily, APPLY ON THE SKIN BID 30 gram tube    levothyroxine (SYNTHROID, LEVOXYL) 50 mcg, oral, Every Day    rosuvastatin (CRESTOR) 5 mg, oral, Nightly    tamsulosin (FLOMAX) 0.4 mg, oral, Daily       Physical Exam:  General:  Patient is awake, alert, and oriented.  Patient is in no acute distress.  HEENT:  Pupils equal and reactive.  Normocephalic.  Moist mucosa.    Neck:  No thyromegaly.  Normal Jugular Venous Pressure.  Cardiovascular:  Regular  "rate and rhythm.  Normal S1 and S2.  Pulmonary:  Clear to auscultation bilaterally.  Abdomen:  Soft. Non-tender.   Non-distended.  Positive bowel sounds.  Lower Extremities:  2+ pedal pulses. No LE edema.  Neurologic:  Cranial nerves intact.  No focal deficit.   Skin: Skin warm and dry, normal skin turgor.   Psychiatric: Normal affect.     Assessment/Plan   Francisco Lizama is a 78 y.o. male presenting with past medical history of hyperlipidemia, nonobstructive CAD, BPH comes to [facility] with chest pain and shortness of breath.  Patient was given a heart score 4 hence cardiology consult was placed for \"chest pain, COVINGTON, heart score 4\"    #Chest pain  #History of nonobstructive CAD per left heart cath 1/2020  -We will obtain a transthoracic echocardiogram for structural evaluation including ejection fraction, assessment of regional wall motion abnormalities or valvular disease, and further evaluation of hemodynamics.    -Cardiac excercise Stress Test 8/11/20231. No clinical or diagnostic electrocardiographic evidence for ischemia at a maximal workload. 2. Adequate level of stress achieved.  -[facility] from January 20, 2020: 1. Nonobstructive CAD in a right dominant system. 2. Mildly elevated LVEDP. 3. No aortic stenosis. The LVEDP was around 18 mmHg.  -2 week holter February 2/2024-> predominantly sinus rhythm  -High-sensitivity troponin negative, EKG nonischemic  -follow up with cardiologist Dr. James Chau post discharge        Code Status:  Full Code    I spent 40 minutes in the professional and overall care of this patient.        HCERRY Angeles-ERNESTO     ===============================================  Attending Note   ===============================================  Both the GARFEILD and I have had a face to face encounter with the patient today. I have examined the patient and edited the documented physical examination as necessary.  I personally reviewed the patient's recent labs, medications, orders, EKGs, and " "pertinent cardiac imaging.  I have reviewed the GARFIELD's encounter note, approve the GARFIELD's documentation and have edited the note to reflect the diagnostic and therapeutic plan.    Francisco Lizama is a 78 y.o. M who is presenting with past medical history of hyperlipidemia, nonobstructive CAD, BPH comes to [facility] with chest pain and shortness of breath.  Patient was given a heart score 4 hence cardiology consult was placed for \"chest pain, COVINGTON, heart score 4\"      Per the patient he had a scheduled appointment with his PCP yesterday.  Claims he was sent to the emergency room from his PCP office for noted new facial droop and complaints of chest tightness and pain.  Patient says for the last couple months has been complaining about chest tightness and dyspnea that occurs on exertion.  He also explains he has times of lightheadedness as well.  Patient had a recent cardiac exercise stress test in August which was negative and recent Holter monitor did not show any new arrhythmias.  He denies shortness of breath at rest, denies chest pain at rest, endorses lightheadedness from time to time, endorses dyspnea on exertion, denies nausea, denies vomiting, endorses taking all his medications.      Afebrile, heart rate 50, blood pressure 117/60, 94% on room air.  Notable labs on admission BUNs/CR 15/1.02, high sensitive troponin negative, BNP 35, TSH 3.14, D-dimer 321, H&H 14.1/42.2, CT of the head was nonacute, respiratory viral panel is pending, chest x-ray is nonacute, EKG shows sinus bradycardia rates in the 50s no acute signs of ischemia.  Patient was made n.p.o. after midnight and started on aspirin.    Home cardiology meds include Crestor 5 mg daily.    Past Cardiology Tests (Last 3 Years):    Holter 2 weeks 2/5/2024: The predominant rhythm was sinus;  Heart rate was 154 minimum heart rate was 36 average heart rate was 57, 48 VE beats with a burden of less than 1%.  1 occurrence of ventricular tachycardia with the fastest " episode at 120 bpm with the longest episode of 5 beats.  There were 17 occurrences of SVT with the fastest beat 154 longest episode 15 beats.  There were 5 patient triggers.    Cardiac exercise Stress Test 8/11/20231. No clinical or diagnostic electrocardiographic evidence for ischemia at a maximal workload. 2. Adequate level of stress achieved.    Echocardiogram from January 20, 2020: 1. The left ventricular systolic function is normal with a 60-65% estimated ejection fraction. 2. RVSP within normal limits.     Cardiac catheterization from January 20, 2020: 1. Nonobstructive CAD in a right dominant system. 2. Mildly elevated LVEDP. 3. No aortic stenosis. The LVEDP was around 18 mmHg.      No exacerbating or relieving factors.  Patient denies chest pain and angina.  Pt denies shortness of breath, dyspnea on exertion, orthopnea, and paroxysmal nocturnal dyspnea.  Pt denies worsening lower extremity edema.  Pt denies palpitations or syncope.  No recent falls.  No fever or chills.  No cough.  No change in bowel or bladder habits.  No sick contacts.  No recent travel.     12 point review of systems including (Constitutional, Eyes, ENMT, Respiratory, Cardiac, Gastrointestinal, Neurological, Psychiatric, and Hematologic) was performed and is otherwise negative.    Past medical history:  As above.    Medications were reviewed.    Allergies were reviewed.    Social history:  Patient denies smoking, alcohol abuse, or illicit drug use.    Family history:  No sudden cardiac death or premature coronary artery disease.     Patient seen and examined in room 104.  He is seen in the presence of daughter, wife.  Permission granted discussed medical care in the presence.  He is in no acute distress.  He offers no conversational dyspnea.  He is not on supplemental oxygen  General:  Patient is awake, alert, and oriented.  Patient is in no acute distress.  HEENT:  Pupils equal and reactive.  Normocephalic.  Moist mucosa.    Neck:  No  "thyromegaly.  Normal Jugular Venous Pressure.  Cardiovascular:  Regular rate and rhythm.  Normal S1 and S2.  Pulmonary:  Clear to auscultation bilaterally.  Abdomen:  Soft. Non-tender.   Non-distended.  Positive bowel sounds.  Lower Extremities:  2+ pedal pulses. No LE edema.  Neurologic:  Cranial nerves intact.  No focal deficit.   Skin: Skin warm and dry, normal skin turgor.   Psychiatric: Normal affect.    Vital signs, telemetry, medications, labs, and imaging were reviewed as well.        Francisco Lizama is a 78 y.o. male presenting with past medical history of hyperlipidemia, nonobstructive CAD, BPH comes to [facility] with chest pain and shortness of breath.  Patient was given a heart score 4 hence cardiology consult was placed for \"chest pain, COVINGTON, heart score 4\"    #Chest pain  #History of nonobstructive CAD per left heart cath 1/2020  -We will obtain a transthoracic echocardiogram for structural evaluation including ejection fraction, assessment of regional wall motion abnormalities or valvular disease, and further evaluation of hemodynamics.    -Cardiac excercise Stress Test 8/11/20231. No clinical or diagnostic electrocardiographic evidence for ischemia at a maximal workload. 2. Adequate level of stress achieved.  -[facility] from January 20, 2020: 1. Nonobstructive CAD in a right dominant system. 2. Mildly elevated LVEDP. 3. No aortic stenosis. The LVEDP was around 18 mmHg.  -2 week holter February 2/2024-> predominantly sinus rhythm  -High-sensitivity troponin negative, EKG nonischemic    Chest pain and dyspnea that has been present for several years with extensive evaluation in the outpatient setting.  Symptoms do not appear unchanged.  He does report being told by [employer] watch that he has atrial fibrillation however no atrial fibrillation has been documented on event monitors x 2 nor on any in office, hospital EKGs  --If atrial fibrillation remains a concern, then would consider implantable loop recorder after " discussion with primary cardiologist.  As of this time, we do not have empiric evidence and would hold off on anticoagulation  --Regarding his chest discomfort, shortness of breath.  Again, this has been extensively evaluated in the outpatient setting and remains difficult to elucidate a cause.  We will check an echocardiogram and provided no untoward findings, he can follow-up in the outpatient setting with his primary cardiologist.  If this continues to be an issue, consideration for dyspnea clinic can be discussed with his primary cardiologist.    Reji Apple DO   Clinical Cardology   [facility] [place]  [facility]

## 2024-03-20 NOTE — CARE PLAN
The patient's goals for the shift include      The clinical goals for the shift include  relieve pain

## 2024-03-20 NOTE — PROGRESS NOTES
03/20/24 1001   Discharge Planning   Living Arrangements Spouse/significant other   Support Systems Spouse/significant other;Children   Assistance Needed No home going needs identified   Type of Residence Private residence   Number of Stairs Within Residence 13   Home or Post Acute Services None   Patient expects to be discharged to: Home   Does the patient need discharge transport arranged? Yes   RoundTrip coordination needed? Yes   Financial Resource Strain   How hard is it for you to pay for the very basics like food, housing, medical care, and heating? Not hard   Housing Stability   In the last 12 months, was there a time when you were not able to pay the mortgage or rent on time? N   In the last 12 months, how many places have you lived? 1   In the last 12 months, was there a time when you did not have a steady place to sleep or slept in a shelter (including now)? N   Transportation Needs   In the past 12 months, has lack of transportation kept you from medical appointments or from getting medications? no   In the past 12 months, has lack of transportation kept you from meetings, work, or from getting things needed for daily living? No     Met with patient, wife and daughter at bedside to  discuss preferences for care upon discharge. Discussed how patient manages health at home. Lives with his wife in a house .  Independent in all ADLs.  No home O2, dialysis, or assistive devices. Patient has been getting SOB with walking the stairs, getting into bed, etc.  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  He said he stopped running about 15 years ago because he was becoming  SOB.  Has had a holter monitor before.  States he has had two episodes of afib over the last couple of months.  Patient is not on any anticoagulation.  No additional resources or needs identified. Reviewed today's plan of care.  Patient verbalized understanding as evidenced by teach back method. Patient plans  to return home at discharge & follow up with his PCP.  Wife will provide transportation home upon discharge.    LEONARD Chester RN [facility]

## 2024-03-21 ENCOUNTER — PATIENT OUTREACH (OUTPATIENT)
Dept: CARE COORDINATION | Facility: CLINIC | Age: 79
End: 2024-03-21
Payer: MEDICARE

## 2024-03-21 NOTE — PROGRESS NOTES
Discharge Facility:[facility]  Discharge Diagnosis:Chest pain  Admission Date:03/19/24  Discharge Date: 03/20/24    PCP Appointment Date:04/02/24  Specialist Appointment Date:   Hospital Encounter and Summary: Linked  See discharge assessment below for further details  Engagement  Call Start Time: 0850 (3/21/2024  8:50 AM)    Medications  Medications reviewed with patient/caregiver?: Yes (asa 81mg) (3/21/2024  8:50 AM)  Is the patient having any side effects they believe may be caused by any medication additions or changes?: No (3/21/2024  8:50 AM)  Does the patient have all medications ordered at discharge?: Yes (3/21/2024  8:50 AM)  Is the patient taking all medications as directed (includes completed medication regime)?: Yes (3/21/2024  8:50 AM)    Appointments  Does the patient have a primary care provider?: Yes (3/21/2024  8:50 AM)  Care Management Interventions: Verified appointment date/time/provider (3/21/2024  8:50 AM)    Self Management  Has home health visited the patient within 72 hours of discharge?: Not applicable (3/21/2024  8:50 AM)  Has all Durable Medical Equipment (DME) been delivered?: No (3/21/2024  8:50 AM)    Patient Teaching  Does the patient have access to their discharge instructions?: Yes (3/21/2024  8:50 AM)  Care Management Interventions: Reviewed instructions with patient (3/21/2024  8:50 AM)  What is the patient's perception of their health status since discharge?: Improving (3/21/2024  8:50 AM)    Wrap Up  Call End Time: 0910 (3/21/2024  8:50 AM)

## 2024-04-01 ENCOUNTER — OFFICE VISIT (OUTPATIENT)
Dept: CARDIOLOGY | Facility: CLINIC | Age: 79
End: 2024-04-01
Payer: MEDICARE

## 2024-04-01 VITALS
BODY MASS INDEX: 24.22 KG/M2 | HEART RATE: 53 BPM | WEIGHT: 159.8 LBS | OXYGEN SATURATION: 96 % | DIASTOLIC BLOOD PRESSURE: 69 MMHG | SYSTOLIC BLOOD PRESSURE: 110 MMHG | HEIGHT: 68 IN

## 2024-04-01 DIAGNOSIS — I25.10 CORONARY ARTERY DISEASE INVOLVING NATIVE CORONARY ARTERY OF NATIVE HEART, UNSPECIFIED WHETHER ANGINA PRESENT: ICD-10-CM

## 2024-04-01 DIAGNOSIS — I20.9 ANGINA PECTORIS (CMS-HCC): Primary | ICD-10-CM

## 2024-04-01 DIAGNOSIS — R06.09 DOE (DYSPNEA ON EXERTION): ICD-10-CM

## 2024-04-01 PROCEDURE — 1159F MED LIST DOCD IN RCRD: CPT | Performed by: STUDENT IN AN ORGANIZED HEALTH CARE EDUCATION/TRAINING PROGRAM

## 2024-04-01 PROCEDURE — 1160F RVW MEDS BY RX/DR IN RCRD: CPT | Performed by: STUDENT IN AN ORGANIZED HEALTH CARE EDUCATION/TRAINING PROGRAM

## 2024-04-01 PROCEDURE — 1126F AMNT PAIN NOTED NONE PRSNT: CPT | Performed by: STUDENT IN AN ORGANIZED HEALTH CARE EDUCATION/TRAINING PROGRAM

## 2024-04-01 PROCEDURE — 99215 OFFICE O/P EST HI 40 MIN: CPT | Performed by: STUDENT IN AN ORGANIZED HEALTH CARE EDUCATION/TRAINING PROGRAM

## 2024-04-01 RX ORDER — REGADENOSON 0.08 MG/ML
0.4 INJECTION, SOLUTION INTRAVENOUS
Status: CANCELLED | OUTPATIENT
Start: 2024-04-01

## 2024-04-01 ASSESSMENT — ENCOUNTER SYMPTOMS
OCCASIONAL FEELINGS OF UNSTEADINESS: 0
DEPRESSION: 0
LOSS OF SENSATION IN FEET: 0

## 2024-04-01 ASSESSMENT — PAIN SCALES - GENERAL: PAINLEVEL: 0-NO PAIN

## 2024-04-01 NOTE — PROGRESS NOTES
Location of visit: [address]   Type of Visit: Established - Last Seen: 8/3/2023     Chief Complaint:  Patient was self-referred to Cardiology due to atypical chest pain, fatigue, and palpitations.    History Of Present Illness:    Francisco Lizama is a 79 y.o. male, with history significant for bradycardia, HLD, CAD, gout, and HypoT4, who visits Cardiology today as a follow up visit for chest pain, COVINGTON, and palpitations. He was previously seen in August 2023 due to persistent fatigue and lightheadedness. All prior testing done was negative, including EKG, TTE which showed concentric remodeling, 6MWT, LHC, PFTs/DLCO, troponins, BNP, and exhaled NO. His symptoms were finally attributed to high dose statin treatment and double tamsulosin dose recommended by his brother (anesthesiologist). He is know on a low dose Crestor (5 mg) and single PM dose of tamsulosin. He also complained of episodes of palpitations which were assessed with a Holter which was negative for Afib and demonstrated episodes of supraventricular tachycardia and ine episode of NSVT. His iWatch reported two episodes of Afib but on my review they corresponded to PACs.  His fatigue disappeared but now consults for new onset exercise related chest pain and dyspnea on exertion after a significant URI which started 2 months ago. His D-Dimer, BNP, troponin, CMP, and CBC were normal. Surface echocardiogram was significant normal LV function, no regional wall motion abnormalities, but new indeterminate diastolic function with mild LA dilation (diastolic function was normal in the prior study). He also reported that his pain can also change when changing position (laying back or standing) but not with respiration.    Patient denies chest pain, dyspnea on exertion, shortness of breath, orthopnea, PND, nocturia, edema, palpitations, dizziness, lightheadedness, syncope, claudication, or snoring/apnea.    Blood pressure today: 110/69 mmHg    Last  ECG shows  "sinus bradycardia at 46 bpm, borderline AV conduction, and normal ventricular repolarization.    Past Medical History:  He has a past medical history of Acquired hypothyroidism, BPH (benign prostatic hyperplasia), Gout, Mixed hyperlipidemia, unclear Paroxysmal atrial fibrillation (CMS/HCC), Raynaud's syndrome without gangrene, and Scrotal mass.    Past Surgical History:  He has a past surgical history that includes Tonsillectomy.    Social History:  He reports that he has never smoked. He has never used smokeless tobacco. He reports current alcohol use of about 1.0 standard drink of alcohol per week. He reports that he does not use drugs.    Family History:  Family History   Problem Relation Name Age of Onset    Other (lung fibrosis) Mother      Other (CABG) Father      No Known Problems Brother       Allergies:  Patient has no known allergies.    Outpatient Medications:  Current Outpatient Medications   Medication Instructions    aspirin 81 mg, oral, Daily    levothyroxine (SYNTHROID, LEVOXYL) 50 mcg, oral, Every Day    rosuvastatin (CRESTOR) 5 mg, oral, Nightly    tamsulosin (FLOMAX) 0.4 mg, oral, Daily     Last Recorded Vitals:  Vitals:    04/01/24 1507   BP: 110/69   BP Location: Left arm   Patient Position: Sitting   BP Cuff Size: Adult   Pulse: 53   SpO2: 96%   Weight: 72.5 kg (159 lb 12.8 oz)   Height: 1.727 m (5' 8\")     Physical Exam:      4/2/2024    10:26 AM 4/1/2024     3:07 PM 3/20/2024     5:00 PM 3/20/2024    12:00 PM 3/20/2024     8:12 AM 3/20/2024     4:10 AM   Vitals   Systolic  110 129 118 118 117   Diastolic  69 82 74 66 60   Heart Rate 76 53 58 55 56 50   Temp   35.7 °C (96.3 °F) 36 °C (96.8 °F) 36.2 °C (97.2 °F) 35.7 °C (96.3 °F)   Resp 18  16 18 18 17   Height (in)  1.727 m (5' 8\")       Weight (lb) 160.2 159.8       BMI 24.36 kg/m2 24.3 kg/m2       BSA (m2) 1.87 m2 1.87 m2       Visit Report Report Report         Wt Readings from Last 5 Encounters:   04/02/24 72.7 kg (160 lb 3.2 oz) "   04/01/24 72.5 kg (159 lb 12.8 oz)   03/19/24 72.6 kg (160 lb)   03/19/24 72.6 kg (160 lb)   12/05/23 72.6 kg (160 lb)     General: Sitting up comfortably in chair; in no apparent distress.  HEENT: Normocephalic; atraumatic. Well hydrated.  Eyes: Anicteric sclera. Extraocular movement intact.  Neck: Supple; no thyromegaly; normal jugular venous pressure, no bruits.  Respiratory: Bilateral air entry equal. No wheezing.  Cardiovascular: Normal S1, S2; no murmurs auscultated.  Abdomen: Nondistended; nontender. (+) bowel sounds.  Extremities: No peripheral edema present. Pulses 2+ diffusely.  Neurological: Oriented to time, place, and person; nonfocal.  Psychiatric: Normal affect.     Last Labs Reviewed:  CBC -  Recent Labs     03/20/24  0433 03/19/24  1050 11/30/23  0858 11/25/22  1543 10/05/21  0840   WBC 5.3 6.6 6.0 6.7 5.0   HGB 14.1 16.2 15.6 15.6 16.3   HCT 42.2 47.6 46.1 46.7 45.2    174 165 168 170   MCV 88 89 90 91 87     CMP -  Recent Labs     03/20/24  0433 03/19/24  1215 03/19/24  1050 11/30/23  0858 11/25/22  1543 10/05/21  0840     --  139 142 139 140   K 4.4  --  4.2 4.6 4.4 4.2     --  103 105 101 104   CO2 27  --  28 28 30 28   ANIONGAP 11  --  12 14 12 12   BUN 15  --  17 12 15 18   CREATININE 1.02  --  1.00 0.94 1.20 1.00   EGFR 76  --  78 83  --   --    MG 2.00 2.20  --   --   --   --    CALCIUM 9.0  --  9.7 9.7 9.6 9.9     Recent Labs     03/19/24  1050 11/30/23  0858 11/25/22  1543 10/05/21  0840 01/20/20  0508   ALBUMIN 4.9 4.6 4.7 4.7 3.9   ALKPHOS 70 58 72 74 51   ALT 15 18 17 18 22   AST 18 17 19 18 19   BILITOT 1.4* 1.5* 1.4* 2.0* 1.4*     LIPID PANEL -   Recent Labs     03/20/24  0433 11/30/23  0858 11/25/22  1543 10/05/21  0840 12/13/19  1146   CHOL 142 152 174 160 146   LDLF  --   --  72 87 77   LDLCALC 75 79  --   --   --    HDL 37.4 44.0 38.4* 41.3 40.7   TRIG 147 145 318* 160* 142     COAGULATION PANEL -  Recent Labs     03/19/24  1306 01/19/20  1740   INR  --  1.0    DDIMERVTE 321 <215     HEME/ENDO -  Recent Labs     03/19/24  1215 03/19/24  1050 11/30/23  0858 11/25/22  1543 10/05/21  0840 01/19/20  1740 12/13/19  1146 08/24/18  0828   TSH 3.14  --  2.62 3.05 3.07   < > 1.85 1.83   HGBA1C  --  5.9*  --   --   --   --   --   --    FREET4  --   --   --   --   --   --  1.05 0.89    < > = values in this interval not displayed.     CARDIOVASCULAR  Recent Labs     03/19/24  1215 03/19/24  1050 01/20/20  0508 01/19/20  1740   TROPHS 4 3  --   --    BNP  --  35 19 19   CRP 0.13  --   --   --      Last Cardiology/Imaging Tests Personally Reviewed (if images available) and Interpreted:  ECG:  Encounter Date: 03/19/24   ECG 12 lead   Result Value    Ventricular Rate 46    Atrial Rate 46    IL Interval 202    QRS Duration 88    QT Interval 418    QTC Calculation(Bazett) 365    P Axis 58    R Axis 46    T Axis 36    QRS Count 8    Q Onset 221    P Onset 120    P Offset 176    T Offset 430    QTC Fredericia 382    Narrative    Sinus bradycardia     Echocardiogram:  Transthoracic Echo (TTE) Complete 03/20/2024  CONCLUSIONS:   1. Left ventricular systolic function is normal with a 70% estimated ejection fraction.   2. Spectral Doppler shows an impaired relaxation pattern of left ventricular diastolic filling.   3. The left atrium is mildly dilated.    Cath:  Left Heart Cath 1/20/2020  Coronary Angiography:  The coronary circulation is right dominant.    Left Main Coronary Artery:  The left main coronary artery is a normal caliber vessel. The left main arises normally from the left coronary sinus of Valsalva and bifurcates into the LAD and circumflex coronary arteries. The left main coronary artery showed no significant disease or stenosis greater than 30%.    Left Anterior Descending Coronary Artery Distribution:  The left anterior descending coronary artery is a normal caliber vessel. The LAD arises normally from the left main coronary artery. The LAD demonstrated moderate calcification.  The mid left anterior descending coronary artery showed 30% stenosis. The 1st diagonal branch is a small caliber vessel. The 1st diagonal branch showed no significant disease or stenosis greater than 30%. The 2nd diagonal branch is a normal caliber vessel. The 2nd diagonal branch demonstrated no significant disease or stenosis greater than 30%.    Circumflex Coronary Artery Distribution:  The circumflex coronary artery is a normal caliber vessel. The circumflex arises normally from the left main coronary artery and terminates in the AV groove. The circumflex revealed no significant disease or stenosis greater than 30% and calcification. The 1st obtuse marginal branch is a very small caliber vessel. The 1st obtuse marginal branch showed no significant disease or stenosis greater than 30%. The 2nd obtuse marginal branch is a normal caliber vessel. The 2nd obtuse marginal branch demonstrated no significant disease or stenosis greater than 30%.    Right Coronary Artery Distribution:    The right coronary artery is a normal caliber vessel. The RCA arises normally from the right sinus of Valsalva. The RCA showed moderate calcification. The mid right coronary artery showed 10 to 30% stenosis.  ____________________________________________________________________________________  CONCLUSIONS:  1. Nonobstructive CAD in a right dominant system.  2. Mildly elevated LVEDP.  3. No aortic stenosis.    Stress Test:  Treadmill Stress Test 8/11/2023  - The peak heart rate achieved was 134 bpm, which was 94 % of the age predicted target heart rate.  - The resting blood pressure was 122/72 mmHg with a heart rate of 54 bpm. The standing blood pressure was 120/68 mmHg with a heart rate of 61 bpm.  - The patient's functional capacity was above average.  - The patient developed shortness of breath and leg fatigue during the stress exam.  - The symptoms resolved with rest 2 minutes into recovery.  - The blood pressure response was  normal.  - There was a 0.5mm ST segment depression in leads II, III and aVF and V4,V5, and V6 during the peak stress period. ST changes resolved in immediate recovery.    Cardiac CT/MRI:  No results found.    CV RISK FACTORS:   # Hypertension: Last BP: 110/69.  # Hyperlipidemia: Last Tchol 142 / LDL No results found for requested labs within last 365 days. / HDL 37.4 / TRIG 147 (3/20/2024:  4:33 AM).  # Type II Diabetes Mellitus: Last A1c 5.9 (3/19/2024: 10:50 AM).  # Obesity: Last BMI: 24.3.  # CKD: Last BUN/Cr (GFR): 15/1.02 (76), 3/20/2024:  4:33 AM.    ASCV RISK:  The 10-year ASCVD risk score (Mukul GHOSH, et al., 2019) is: 23.3%    Values used to calculate the score:      Age: 79 years      Sex: Male      Is Non- : No      Diabetic: No      Tobacco smoker: No      Systolic Blood Pressure: 110 mmHg      Is BP treated: No      HDL Cholesterol: 37.4 mg/dL      Total Cholesterol: 142 mg/dL    Assessment/Plan   79 y.o. male, with history significant for bradycardia, HLD, CAD, gout, and HypoT4, who visits Cardiology today as a follow up of fatigue and palpitations with new onset exercise related chest pain. He has known mild CAD in a cath 4 years ago but he has been undertreated due to intolerance of statins and the pain is typical for angina with exception of occasional changes with position. The labs and studies done at  were performed without active chest pain. Post myocarditis chest pain could be a possible diagnoses, less likely an pericarditis due to lack of active inflammation on blood work and EKG changes suggestive. If stress MRI is negative for scar/inflammation or pericarditis will explore chronotropic incompetence as an option for exercise related symptoms.     Recommendations:  - Angina/COVINGTON/CAD: cardiac stress MRI looking for prior inflammation, rule out ischemia, and rule out pericardial component in one test  - Paroxysmal atrial fibrillation: unclear prior diagnosis, never on  anticoagulation in the past and non proven Afib on his Holter monitor studies and iWatch monitoring. If an episode is demonstrated he will be started on anticoagulation.   - I will contact the patient once the results are available through [facility]  - I spent 40 minutes assessing the case between pre-charting, face-to-face patient interaction, and documentation    James Calix MD

## 2024-04-01 NOTE — PATIENT INSTRUCTIONS
Dear Francisco Lizama,    It was a pleasure meeting you today at the Cardiology office. As we dicussed, your clinical condition is atypical chest pain which could correspond to angina post viral syndrome. Acute pericarditis is in the differential diagnosis. I recommended a cardiac stress MRI. The study will help us find out what is the likelihood of having pain related to coronary artery disease and assess presence of inflammation in the pericardium.I will contact you once your results are available to give you my impressions through [facility].    Sincerely,     James Calix MD

## 2024-04-02 ENCOUNTER — OFFICE VISIT (OUTPATIENT)
Dept: PRIMARY CARE | Facility: CLINIC | Age: 79
End: 2024-04-02
Payer: MEDICARE

## 2024-04-02 VITALS
BODY MASS INDEX: 24.36 KG/M2 | HEART RATE: 76 BPM | OXYGEN SATURATION: 97 % | RESPIRATION RATE: 18 BRPM | WEIGHT: 160.2 LBS

## 2024-04-02 DIAGNOSIS — R07.9 CHEST PAIN, UNSPECIFIED TYPE: Primary | ICD-10-CM

## 2024-04-02 PROCEDURE — 1159F MED LIST DOCD IN RCRD: CPT | Performed by: INTERNAL MEDICINE

## 2024-04-02 PROCEDURE — 99495 TRANSJ CARE MGMT MOD F2F 14D: CPT | Performed by: INTERNAL MEDICINE

## 2024-04-02 PROCEDURE — 1160F RVW MEDS BY RX/DR IN RCRD: CPT | Performed by: INTERNAL MEDICINE

## 2024-04-02 NOTE — PROGRESS NOTES
Subjective   Patient ID: Francisco Lizama is a 78 y.o. male who presents for Hospital Follow-up.    DOA-3/19/24    DOD-3/20/24    Chest Pain-     Echo- LV Diastolic dysfunction    HPI     Review of Systems      No Fever/chills/headaches/dizziness / cough/ shortness of breath/palpitations/ abdominal pain /Nausea/vomiting/diarrhea/ constipation/urine frequency/blood in urine.      Objective   Pulse 76   Resp 18   Wt 72.7 kg (160 lb 3.2 oz)   SpO2 97%   BMI 24.36 kg/m²     Physical Exam      No JVP elevation. No palpable Lymph Nodes. No Thyromegaly    HEENT- Negative    CVS-NL S1/S2 . No MRG    Lungs-CTA. B/S= B/L    Abdomen-Soft, Non-tender. No masses or HSM    Extremities: No C/C/E    Skin-No abnormal moles/rash        Assessment/Plan        Chest Pain- ? Cardiac vs Non- Cardiac( Costochondritis)    Plan:    Stress MRI per Cardiology    ? CCB Vs  BB for Diastoloc dysfunction    Follow up 3 months/PRN

## 2024-04-03 PROBLEM — R06.09 DOE (DYSPNEA ON EXERTION): Status: ACTIVE | Noted: 2024-03-19

## 2024-04-03 PROBLEM — I20.9 ANGINA PECTORIS (CMS-HCC): Status: ACTIVE | Noted: 2024-03-19

## 2024-04-03 PROBLEM — I25.10 CORONARY ARTERY DISEASE INVOLVING NATIVE CORONARY ARTERY OF NATIVE HEART: Status: ACTIVE | Noted: 2024-04-03

## 2024-04-04 ENCOUNTER — PATIENT OUTREACH (OUTPATIENT)
Dept: CARE COORDINATION | Facility: CLINIC | Age: 79
End: 2024-04-04
Payer: MEDICARE

## 2024-04-04 ENCOUNTER — TELEPHONE (OUTPATIENT)
Dept: PRIMARY CARE | Facility: CLINIC | Age: 79
End: 2024-04-04
Payer: MEDICARE

## 2024-04-04 NOTE — PROGRESS NOTES
Call regarding appt. with PCP on  04/02/24 after hospitalization.  At time of outreach call the patient feels as if their condition has improved since last visit.  Reviewed the PCP appointment with the pt and addressed any questions or concerns.

## 2024-04-04 NOTE — TELEPHONE ENCOUNTER
PATIENT CALLED STATED THE EARLIEST HE CAN GET IN FOR HIS MRI IS MAY 16 TH  AND JUST WANTED TO LET DR. MAY KNOW PATIENT WILL BE OUT OF TOWN APRIL 28 TH  - MAY 15 TH .

## 2024-04-05 PROBLEM — I48.0 PAROXYSMAL ATRIAL FIBRILLATION (MULTI): Status: RESOLVED | Noted: 2024-01-16 | Resolved: 2024-04-05

## 2024-04-12 ENCOUNTER — TELEPHONE (OUTPATIENT)
Dept: CARDIOLOGY | Facility: CLINIC | Age: 79
End: 2024-04-12
Payer: MEDICARE

## 2024-04-12 DIAGNOSIS — I31.9 PERICARDITIS, UNSPECIFIED CHRONICITY, UNSPECIFIED TYPE (HHS-HCC): Primary | ICD-10-CM

## 2024-04-12 RX ORDER — COLCHICINE 0.6 MG/1
0.6 TABLET ORAL 2 TIMES DAILY
Qty: 180 TABLET | Refills: 3 | Status: SHIPPED | OUTPATIENT
Start: 2024-04-12 | End: 2024-05-29 | Stop reason: SDUPTHER

## 2024-04-17 ENCOUNTER — PATIENT OUTREACH (OUTPATIENT)
Dept: CARE COORDINATION | Facility: CLINIC | Age: 79
End: 2024-04-17
Payer: MEDICARE

## 2024-04-17 NOTE — PROGRESS NOTES
Successful outreach to patient regarding hospitalization as patient continues TCM program.   At time of outreach call the patient feels as if their condition has improved  since initial visit with PCP or specialist.  Questions or concerns addressed at this time with patient.   Provided contact information to patient if any further non-emergent needs arise.

## 2024-05-16 ENCOUNTER — HOSPITAL ENCOUNTER (OUTPATIENT)
Dept: CARDIOLOGY | Facility: HOSPITAL | Age: 79
Discharge: HOME | End: 2024-05-16
Payer: MEDICARE

## 2024-05-16 ENCOUNTER — HOSPITAL ENCOUNTER (OUTPATIENT)
Dept: RADIOLOGY | Facility: HOSPITAL | Age: 79
Discharge: HOME | End: 2024-05-16
Payer: MEDICARE

## 2024-05-16 DIAGNOSIS — I20.9 ANGINA PECTORIS (CMS-HCC): Primary | ICD-10-CM

## 2024-05-16 DIAGNOSIS — I25.10 CORONARY ARTERY DISEASE INVOLVING NATIVE CORONARY ARTERY OF NATIVE HEART, UNSPECIFIED WHETHER ANGINA PRESENT: ICD-10-CM

## 2024-05-16 DIAGNOSIS — R06.09 DOE (DYSPNEA ON EXERTION): ICD-10-CM

## 2024-05-16 DIAGNOSIS — I20.9 ANGINA PECTORIS (CMS-HCC): ICD-10-CM

## 2024-05-16 PROCEDURE — 93005 ELECTROCARDIOGRAM TRACING: CPT

## 2024-05-16 PROCEDURE — 75565 CARD MRI VELOC FLOW MAPPING: CPT | Performed by: STUDENT IN AN ORGANIZED HEALTH CARE EDUCATION/TRAINING PROGRAM

## 2024-05-16 PROCEDURE — 75563 CARD MRI W/STRESS IMG & DYE: CPT | Performed by: STUDENT IN AN ORGANIZED HEALTH CARE EDUCATION/TRAINING PROGRAM

## 2024-05-16 PROCEDURE — 2550000001 HC RX 255 CONTRASTS: Performed by: STUDENT IN AN ORGANIZED HEALTH CARE EDUCATION/TRAINING PROGRAM

## 2024-05-16 PROCEDURE — A9575 INJ GADOTERATE MEGLUMI 0.1ML: HCPCS | Performed by: STUDENT IN AN ORGANIZED HEALTH CARE EDUCATION/TRAINING PROGRAM

## 2024-05-16 PROCEDURE — 2500000004 HC RX 250 GENERAL PHARMACY W/ HCPCS (ALT 636 FOR OP/ED): Performed by: STUDENT IN AN ORGANIZED HEALTH CARE EDUCATION/TRAINING PROGRAM

## 2024-05-16 PROCEDURE — 75563 CARD MRI W/STRESS IMG & DYE: CPT

## 2024-05-16 RX ORDER — REGADENOSON 0.08 MG/ML
0.4 INJECTION, SOLUTION INTRAVENOUS
Status: COMPLETED | OUTPATIENT
Start: 2024-05-16 | End: 2024-05-16

## 2024-05-16 RX ORDER — GADOTERATE MEGLUMINE 376.9 MG/ML
22 INJECTION INTRAVENOUS
Status: COMPLETED | OUTPATIENT
Start: 2024-05-16 | End: 2024-05-16

## 2024-05-16 RX ORDER — AMINOPHYLLINE 25 MG/ML
100 INJECTION, SOLUTION INTRAVENOUS ONCE
Qty: 4 ML | Refills: 0 | Status: COMPLETED | OUTPATIENT
Start: 2024-05-16 | End: 2024-05-16

## 2024-05-16 RX ADMIN — AMINOPHYLLINE 100 MG: 25 INJECTION, SOLUTION INTRAVENOUS at 07:45

## 2024-05-16 RX ADMIN — REGADENOSON 0.4 MG: 0.08 INJECTION, SOLUTION INTRAVENOUS at 07:00

## 2024-05-16 RX ADMIN — GADOTERATE MEGLUMINE 22 ML: 376.9 INJECTION INTRAVENOUS at 09:06

## 2024-05-17 LAB
ATRIAL RATE: 47 BPM
P AXIS: 49 DEGREES
P OFFSET: 176 MS
P ONSET: 115 MS
PR INTERVAL: 212 MS
Q ONSET: 221 MS
QRS COUNT: 8 BEATS
QRS DURATION: 86 MS
QT INTERVAL: 422 MS
QTC CALCULATION(BAZETT): 373 MS
QTC FREDERICIA: 388 MS
R AXIS: 63 DEGREES
T AXIS: 18 DEGREES
T OFFSET: 432 MS
VENTRICULAR RATE: 47 BPM

## 2024-05-19 LAB
ATRIAL RATE: 51 BPM
P AXIS: 52 DEGREES
P OFFSET: 170 MS
P ONSET: 113 MS
PR INTERVAL: 216 MS
Q ONSET: 221 MS
QRS COUNT: 9 BEATS
QRS DURATION: 86 MS
QT INTERVAL: 418 MS
QTC CALCULATION(BAZETT): 385 MS
QTC FREDERICIA: 396 MS
R AXIS: 5 DEGREES
T AXIS: 35 DEGREES
T OFFSET: 430 MS
VENTRICULAR RATE: 51 BPM

## 2024-05-23 ENCOUNTER — HOSPITAL ENCOUNTER (EMERGENCY)
Facility: HOSPITAL | Age: 79
Discharge: HOME | End: 2024-05-24
Attending: GENERAL PRACTICE
Payer: MEDICARE

## 2024-05-23 ENCOUNTER — APPOINTMENT (OUTPATIENT)
Dept: RADIOLOGY | Facility: HOSPITAL | Age: 79
End: 2024-05-23
Payer: MEDICARE

## 2024-05-23 ENCOUNTER — TELEPHONE (OUTPATIENT)
Dept: CARDIOLOGY | Facility: CLINIC | Age: 79
End: 2024-05-23
Payer: MEDICARE

## 2024-05-23 ENCOUNTER — APPOINTMENT (OUTPATIENT)
Dept: CARDIOLOGY | Facility: HOSPITAL | Age: 79
End: 2024-05-23
Payer: MEDICARE

## 2024-05-23 DIAGNOSIS — R06.02 SHORTNESS OF BREATH: Primary | ICD-10-CM

## 2024-05-23 LAB
ALBUMIN SERPL BCP-MCNC: 4.5 G/DL (ref 3.4–5)
ALP SERPL-CCNC: 72 U/L (ref 33–136)
ALT SERPL W P-5'-P-CCNC: 23 U/L (ref 10–52)
ANION GAP SERPL CALC-SCNC: 15 MMOL/L (ref 10–20)
AST SERPL W P-5'-P-CCNC: 24 U/L (ref 9–39)
BASOPHILS # BLD AUTO: 0.03 X10*3/UL (ref 0–0.1)
BASOPHILS NFR BLD AUTO: 0.5 %
BILIRUB SERPL-MCNC: 1.2 MG/DL (ref 0–1.2)
BNP SERPL-MCNC: 25 PG/ML (ref 0–99)
BUN SERPL-MCNC: 22 MG/DL (ref 6–23)
CALCIUM SERPL-MCNC: 9.5 MG/DL (ref 8.6–10.3)
CARDIAC TROPONIN I PNL SERPL HS: 4 NG/L (ref 0–20)
CHLORIDE SERPL-SCNC: 103 MMOL/L (ref 98–107)
CO2 SERPL-SCNC: 24 MMOL/L (ref 21–32)
CREAT SERPL-MCNC: 1.01 MG/DL (ref 0.5–1.3)
D DIMER PPP FEU-MCNC: 237 NG/ML FEU
EGFRCR SERPLBLD CKD-EPI 2021: 76 ML/MIN/1.73M*2
EOSINOPHIL # BLD AUTO: 0.22 X10*3/UL (ref 0–0.4)
EOSINOPHIL NFR BLD AUTO: 3.4 %
ERYTHROCYTE [DISTWIDTH] IN BLOOD BY AUTOMATED COUNT: 12.5 % (ref 11.5–14.5)
GLUCOSE SERPL-MCNC: 100 MG/DL (ref 74–99)
HCT VFR BLD AUTO: 44.8 % (ref 41–52)
HGB BLD-MCNC: 15.3 G/DL (ref 13.5–17.5)
IMM GRANULOCYTES # BLD AUTO: 0.02 X10*3/UL (ref 0–0.5)
IMM GRANULOCYTES NFR BLD AUTO: 0.3 % (ref 0–0.9)
LIPASE SERPL-CCNC: 21 U/L (ref 9–82)
LYMPHOCYTES # BLD AUTO: 1.98 X10*3/UL (ref 0.8–3)
LYMPHOCYTES NFR BLD AUTO: 30.6 %
MCH RBC QN AUTO: 30.5 PG (ref 26–34)
MCHC RBC AUTO-ENTMCNC: 34.2 G/DL (ref 32–36)
MCV RBC AUTO: 89 FL (ref 80–100)
MONOCYTES # BLD AUTO: 0.62 X10*3/UL (ref 0.05–0.8)
MONOCYTES NFR BLD AUTO: 9.6 %
NEUTROPHILS # BLD AUTO: 3.61 X10*3/UL (ref 1.6–5.5)
NEUTROPHILS NFR BLD AUTO: 55.6 %
NRBC BLD-RTO: 0 /100 WBCS (ref 0–0)
PLATELET # BLD AUTO: 178 X10*3/UL (ref 150–450)
POTASSIUM SERPL-SCNC: 4.2 MMOL/L (ref 3.5–5.3)
PROT SERPL-MCNC: 6.9 G/DL (ref 6.4–8.2)
RBC # BLD AUTO: 5.02 X10*6/UL (ref 4.5–5.9)
SODIUM SERPL-SCNC: 138 MMOL/L (ref 136–145)
WBC # BLD AUTO: 6.5 X10*3/UL (ref 4.4–11.3)

## 2024-05-23 PROCEDURE — 71045 X-RAY EXAM CHEST 1 VIEW: CPT | Performed by: STUDENT IN AN ORGANIZED HEALTH CARE EDUCATION/TRAINING PROGRAM

## 2024-05-23 PROCEDURE — 85379 FIBRIN DEGRADATION QUANT: CPT | Performed by: GENERAL PRACTICE

## 2024-05-23 PROCEDURE — 99283 EMERGENCY DEPT VISIT LOW MDM: CPT | Mod: 25

## 2024-05-23 PROCEDURE — 36415 COLL VENOUS BLD VENIPUNCTURE: CPT | Performed by: GENERAL PRACTICE

## 2024-05-23 PROCEDURE — 85025 COMPLETE CBC W/AUTO DIFF WBC: CPT | Performed by: EMERGENCY MEDICINE

## 2024-05-23 PROCEDURE — 71045 X-RAY EXAM CHEST 1 VIEW: CPT

## 2024-05-23 PROCEDURE — 84484 ASSAY OF TROPONIN QUANT: CPT | Performed by: EMERGENCY MEDICINE

## 2024-05-23 PROCEDURE — 83690 ASSAY OF LIPASE: CPT | Performed by: EMERGENCY MEDICINE

## 2024-05-23 PROCEDURE — 93005 ELECTROCARDIOGRAM TRACING: CPT

## 2024-05-23 PROCEDURE — 83880 ASSAY OF NATRIURETIC PEPTIDE: CPT | Performed by: EMERGENCY MEDICINE

## 2024-05-23 PROCEDURE — 36415 COLL VENOUS BLD VENIPUNCTURE: CPT | Performed by: EMERGENCY MEDICINE

## 2024-05-23 PROCEDURE — 80053 COMPREHEN METABOLIC PANEL: CPT | Performed by: EMERGENCY MEDICINE

## 2024-05-23 ASSESSMENT — COLUMBIA-SUICIDE SEVERITY RATING SCALE - C-SSRS
6. HAVE YOU EVER DONE ANYTHING, STARTED TO DO ANYTHING, OR PREPARED TO DO ANYTHING TO END YOUR LIFE?: NO
2. HAVE YOU ACTUALLY HAD ANY THOUGHTS OF KILLING YOURSELF?: NO
1. IN THE PAST MONTH, HAVE YOU WISHED YOU WERE DEAD OR WISHED YOU COULD GO TO SLEEP AND NOT WAKE UP?: NO

## 2024-05-23 ASSESSMENT — PAIN SCALES - GENERAL: PAINLEVEL_OUTOF10: 1

## 2024-05-23 ASSESSMENT — PAIN DESCRIPTION - DESCRIPTORS: DESCRIPTORS: ACHING

## 2024-05-23 ASSESSMENT — PAIN DESCRIPTION - PAIN TYPE: TYPE: ACUTE PAIN

## 2024-05-23 ASSESSMENT — PAIN DESCRIPTION - LOCATION: LOCATION: CHEST

## 2024-05-23 ASSESSMENT — PAIN - FUNCTIONAL ASSESSMENT: PAIN_FUNCTIONAL_ASSESSMENT: 0-10

## 2024-05-23 ASSESSMENT — PAIN DESCRIPTION - FREQUENCY: FREQUENCY: INTERMITTENT

## 2024-05-23 ASSESSMENT — PAIN DESCRIPTION - ORIENTATION: ORIENTATION: MID;UPPER

## 2024-05-24 VITALS
BODY MASS INDEX: 24.25 KG/M2 | HEART RATE: 51 BPM | SYSTOLIC BLOOD PRESSURE: 132 MMHG | OXYGEN SATURATION: 95 % | RESPIRATION RATE: 20 BRPM | DIASTOLIC BLOOD PRESSURE: 71 MMHG | TEMPERATURE: 98.2 F | HEIGHT: 68 IN | WEIGHT: 160 LBS

## 2024-05-24 LAB — CARDIAC TROPONIN I PNL SERPL HS: 4 NG/L (ref 0–20)

## 2024-05-24 ASSESSMENT — PAIN - FUNCTIONAL ASSESSMENT: PAIN_FUNCTIONAL_ASSESSMENT: 0-10

## 2024-05-24 ASSESSMENT — PAIN SCALES - GENERAL: PAINLEVEL_OUTOF10: 0 - NO PAIN

## 2024-05-24 NOTE — ED PROVIDER NOTES
HPI   Chief Complaint   Patient presents with    Chest Pain     Pt arrived to ed from home. Pt states he's been having chest discomfort for the past 10 days. Pt states he been feeling some chest heaviness in the mid upper chest. Pt states the pain is intermittent. Pt states he also has sob that comes and goes with it. Pt states he just had MRI 10 days ago and that the MRI was unremarkable. Pt states able to speak in full sentences and oxygen level as been between 95-96% now but at home it has  been lower pt is alert and oriented x 4        HPI: 79-year-old male with a history of hyperlipidemia presents for hypoxia.  The patient has a longstanding history of developing chest pain with exertion and has been evaluated by cardiology.  He recently returned from [place].  Prior to leaving for [place] he was treated with colchicine by his cardiologist for suspected pericarditis.  Today he felt fatigued and mildly short of breath and noted on a home pulse oximeter that his oxygen saturation was between 88 and 92% on room air.  By the time he reached the ED he is saturating in the high 90s on room air.  He denies any history of DVT/PE and denies leg swelling      Limitations to history: None  Independent Historians: Patient  External Records Reviewed: MYLES, outpatient notes, inpatient notes  ------------------------------------------------------------------------------------------------------------------------------------------  ROS: a ten point review of systems was performed and was negative except as per HPI.  ------------------------------------------------------------------------------------------------------------------------------------------  PMH / PSH: as per HPI, otherwise reviewed in EMR  MEDS: as per HPI, otherwise reviewed in EMR  ALLERGIES: as per HPI, otherwise reviewed in EMR  SocH:  as per HPI, otherwise reviewed in EMR  FH:  as per HPI, otherwise reviewed in  EMR  ------------------------------------------------------------------------------------------------------------------------------------------  Physical Exam:  VS: As documented in the triage note and EMR flowsheet from this visit was reviewed  General: Well appearing. No acute distress.   Eyes:  Extraocular movements grossly intact. No scleral icterus. No discharge  HEENT:  Normocephalic.  Atraumatic  Neck: Moves neck freely. No gross masses  CV: Regular rhythm. No murmurs, rubs or gallops   Resp: Clear to auscultation bilaterally. No respiratory distress.    GI: Soft, no masses, nontender. No rebound tenderness or guarding  MSK: Symmetric muscle bulk. No deformities. No lower extremity edema.    Skin: Warm, dry, intact.   Neuro: No focal deficits.  A&O x3.   Psych: Appropriate for situation  ------------------------------------------------------------------------------------------------------------------------------------------  Hospital Course / Medical Decision Making:  Independent Interpretations: Chest xray  EKG as interpreted by me: Sinus bradycardia at 51 bpm with first-degree AV block at 224 ms with no bundle branch block and no signs of acute ischemia    MDM: 79-year-old male presents for hypoxia that he noted on a home pulse oximeter and his Apple Watch.  He has been evaluated for suspected pericarditis recently.  D-dimer, troponin and BNP not elevated.  He is not hypoxic in the ED and is not requiring supplemental oxygen.  Chest chest x-ray shows no acute cardiopulmonary process.  He was observed in the ED for over 3 hours and on reevaluation states that he feels well and feels safe going home.  He will follow-up with his primary care physician physician and cardiologist in the morning and will return to the ED for any concerning symptoms.  The patient and his wife are happy with this plan.    Discussion of Management with Other Providers:   I discussed the patient/results with: Emergency medicine  team    Final diagnosis and disposition as below.    Labs Reviewed  COMPREHENSIVE METABOLIC PANEL - Abnormal     Glucose                       100 (*)                Sodium                        138                    Potassium                     4.2                    Chloride                      103                    Bicarbonate                   24                     Anion Gap                     15                     Urea Nitrogen                 22                     Creatinine                    1.01                   eGFR                          76                     Calcium                       9.5                    Albumin                       4.5                    Alkaline Phosphatase          72                     Total Protein                 6.9                    AST                           24                     Bilirubin, Total              1.2                    ALT                           23                  LIPASE - Normal     Lipase                        21                         Narrative: Venipuncture immediately after or during the administration of Metamizole may lead to falsely low results. Testing should be performed immediately prior to Metamizole dosing.  B-TYPE NATRIURETIC PEPTIDE - Normal     BNP                           25                         Narrative:    <100 pg/mL - Heart failure unlikely                  100-299 pg/mL - Intermediate probability of acute heart                                  failure exacerbation. Correlate with clinical                                  context and patient history.                    >=300 pg/mL - Heart Failure likely. Correlate with clinical                                  context and patient history.                                    BNP testing is performed using different testing methodology at [facility] than at other [facility]. Direct result comparisons should only be made within the same method.                      SERIAL TROPONIN-INITIAL - Normal     Troponin I, High Sensitivity   4                          Narrative: Less than 99th percentile of normal range cutoff-                  Female and children under 18 years old <14 ng/L; Male <21 ng/L: Negative                  Repeat testing should be performed if clinically indicated.                                     Female and children under 18 years old 14-50 ng/L; Male 21-50 ng/L:                  Consistent with possible cardiac damage and possible increased clinical                   risk. Serial measurements may help to assess extent of myocardial damage.                                     >50 ng/L: Consistent with cardiac damage, increased clinical risk and                  myocardial infarction. Serial measurements may help assess extent of                   myocardial damage.                                      NOTE: Children less than 1 year old may have higher baseline troponin                   levels and results should be interpreted in conjunction with the overall                   clinical context.                                     NOTE: Troponin I testing is performed using a different                   testing methodology at [facility] than at other                   [facility]. Direct result comparisons should only                   be made within the same method.  SERIAL TROPONIN, 1 HOUR - Normal     Troponin I, High Sensitivity   4                          Narrative: Less than 99th percentile of normal range cutoff-                  Female and children under 18 years old <14 ng/L; Male <21 ng/L: Negative                  Repeat testing should be performed if clinically indicated.                                     Female and children under 18 years old 14-50 ng/L; Male 21-50 ng/L:                  Consistent with possible cardiac damage and possible increased clinical                   risk. Serial measurements  may help to assess extent of myocardial damage.                                     >50 ng/L: Consistent with cardiac damage, increased clinical risk and                  myocardial infarction. Serial measurements may help assess extent of                   myocardial damage.                                      NOTE: Children less than 1 year old may have higher baseline troponin                   levels and results should be interpreted in conjunction with the overall                   clinical context.                                     NOTE: Troponin I testing is performed using a different                   testing methodology at [facility] than at other                   [facility]. Direct result comparisons should only                   be made within the same method.  D-DIMER, NON VTE - Normal     D-Dimer Non VTE, Quant (ng/mL FEU)   237                        Narrative: The D-Dimer assay is reported in ng/mL Fibrinogen Equivalent Units (FEU). The results of this assay should NOT be used for the exclusion of Deep Vein Thrombosis and/or Pulmonary Embolism.  CBC WITH AUTO DIFFERENTIAL     WBC                           6.5                    nRBC                          0.0                    RBC                           5.02                   Hemoglobin                    15.3                   Hematocrit                    44.8                   MCV                           89                     MCH                           30.5                   MCHC                          34.2                   RDW                           12.5                   Platelets                     178                    Neutrophils %                 55.6                   Immature Granulocytes %, Automated   0.3                    Lymphocytes %                 30.6                   Monocytes %                   9.6                    Eosinophils %                 3.4                    Basophils %                    0.5                    Neutrophils Absolute          3.61                   Immature Granulocytes Absolute, Au*   0.02                   Lymphocytes Absolute          1.98                   Monocytes Absolute            0.62                   Eosinophils Absolute          0.22                   Basophils Absolute            0.03                TROPONIN SERIES- (INITIAL, 1 HR)    XR chest 1 view   Final Result    No acute cardiopulmonary process.                MACRO:    None.          Signed by: Kenan Hayden 5/23/2024 11:31 PM    Dictation workstation:   [id number]                                    [place] Coma Scale Score: 15                     Patient History   Past Medical History:   Diagnosis Date    Acquired hypothyroidism     BPH (benign prostatic hyperplasia)     Gout     Mixed hyperlipidemia     Paroxysmal atrial fibrillation (Multi)     Raynaud's syndrome without gangrene     Scrotal mass      Past Surgical History:   Procedure Laterality Date    TONSILLECTOMY       Family History   Problem Relation Name Age of Onset    Other (lung fibrosis) Mother      Other (CABG) Father      No Known Problems Brother       Social History     Tobacco Use    Smoking status: Never    Smokeless tobacco: Never   Vaping Use    Vaping status: Never Used   Substance Use Topics    Alcohol use: Yes     Alcohol/week: 1.0 standard drink of alcohol     Types: 1 Standard drinks or equivalent per week    Drug use: Never       Physical Exam   ED Triage Vitals [05/23/24 2154]   Temperature Heart Rate Respirations BP   36.8 °C (98.2 °F) (!) 49 20 155/76      Pulse Ox Temp Source Heart Rate Source Patient Position   95 % Oral Monitor Sitting      BP Location FiO2 (%)     Left arm --       Physical Exam    ED Course & MDM   Diagnoses as of 05/28/24 0752   Shortness of breath       Medical Decision Making      Procedure  Procedures     Daljit Gooden,   05/28/24 0759

## 2024-05-25 LAB
ATRIAL RATE: 51 BPM
P AXIS: 73 DEGREES
P OFFSET: 160 MS
P ONSET: 110 MS
PR INTERVAL: 224 MS
Q ONSET: 222 MS
QRS COUNT: 8 BEATS
QRS DURATION: 82 MS
QT INTERVAL: 412 MS
QTC CALCULATION(BAZETT): 379 MS
QTC FREDERICIA: 390 MS
R AXIS: -15 DEGREES
T AXIS: 26 DEGREES
T OFFSET: 428 MS
VENTRICULAR RATE: 51 BPM

## 2024-05-29 ENCOUNTER — OFFICE VISIT (OUTPATIENT)
Dept: CARDIOLOGY | Facility: HOSPITAL | Age: 79
End: 2024-05-29
Payer: MEDICARE

## 2024-05-29 VITALS
SYSTOLIC BLOOD PRESSURE: 138 MMHG | HEART RATE: 65 BPM | OXYGEN SATURATION: 96 % | BODY MASS INDEX: 24.25 KG/M2 | HEIGHT: 68 IN | WEIGHT: 160 LBS | DIASTOLIC BLOOD PRESSURE: 84 MMHG

## 2024-05-29 DIAGNOSIS — R07.9 CHEST PAIN, UNSPECIFIED TYPE: Primary | ICD-10-CM

## 2024-05-29 DIAGNOSIS — I31.9 PERICARDITIS, UNSPECIFIED CHRONICITY, UNSPECIFIED TYPE (HHS-HCC): ICD-10-CM

## 2024-05-29 DIAGNOSIS — E78.5 HYPERLIPIDEMIA, UNSPECIFIED HYPERLIPIDEMIA TYPE: ICD-10-CM

## 2024-05-29 DIAGNOSIS — I25.10 MILD CORONARY ARTERY DISEASE: ICD-10-CM

## 2024-05-29 PROCEDURE — 99214 OFFICE O/P EST MOD 30 MIN: CPT | Performed by: STUDENT IN AN ORGANIZED HEALTH CARE EDUCATION/TRAINING PROGRAM

## 2024-05-29 PROCEDURE — 1160F RVW MEDS BY RX/DR IN RCRD: CPT | Performed by: STUDENT IN AN ORGANIZED HEALTH CARE EDUCATION/TRAINING PROGRAM

## 2024-05-29 PROCEDURE — 1126F AMNT PAIN NOTED NONE PRSNT: CPT | Performed by: STUDENT IN AN ORGANIZED HEALTH CARE EDUCATION/TRAINING PROGRAM

## 2024-05-29 PROCEDURE — 1159F MED LIST DOCD IN RCRD: CPT | Performed by: STUDENT IN AN ORGANIZED HEALTH CARE EDUCATION/TRAINING PROGRAM

## 2024-05-29 RX ORDER — COLCHICINE 0.6 MG/1
0.6 TABLET ORAL DAILY
Qty: 90 TABLET | Refills: 3 | Status: SHIPPED | OUTPATIENT
Start: 2024-05-29 | End: 2025-05-29

## 2024-05-29 ASSESSMENT — PAIN SCALES - GENERAL: PAINLEVEL: 0-NO PAIN

## 2024-05-29 NOTE — PROGRESS NOTES
Location of visit: [facility]   Type of Visit: Established - Last Seen: 4/1/2024     Chief Complaint:  Patient was self-referred to Cardiology for FUV on MRI.    History Of Present Illness:    Francisco Lizama is a 79 y.o. male, with history significant for bradycardia, HLD, CAD, gout, and HypoT4, who visits Cardiology today as a follow up visit for chest pain, [place], and palpitations. He was previously seen in August 2023 due to persistent fatigue and lightheadedness. All prior testing done was negative, including EKG, TTE which showed concentric remodeling, 6MWT, LHC, PFTs/DLCO, troponins, BNP, and exhaled NO. His symptoms were finally attributed to high dose statin treatment and double tamsulosin dose recommended by his brother (anesthesiologist). He is know on a low dose Crestor (5 mg) and single PM dose of tamsulosin. He also complained of episodes of palpitations which were assessed with a Holter which was negative for Afib and demonstrated episodes of supraventricular tachycardia and ine episode of NSVT. His iWatch reported two episodes of Afib but on my review they corresponded to PACs.  His fatigue disappeared but now consults for new onset exercise related chest pain and dyspnea on exertion after a significant URI which started 4 months ago. His D-Dimer, BNP, troponin, CMP, and CBC were normal. Surface echocardiogram was significant normal LV function, no regional wall motion abnormalities, but new indeterminate diastolic function with mild LA dilation (diastolic function was normal in the prior study). He also reported that his pain can also change when changing position (laying back or standing) but not with respiration.    He underwent a stress MRI to ruled out ischemia and/or pericarditis. Study was negative for ischemia, consistent with mild disease reported on prior [facility] from 1/2020. Study reported not pericardial inflammation, but 2nd review from Dr. Hunt was significant for possible focal  "myocarditis and pericarditis. Patient refers that symptoms disappeared with colchicine, and he feels better than ever. He had diarrhea on double dose so decreased to 1 tablet.       Patient denies chest pain, dyspnea on exertion, shortness of breath, orthopnea, PND, nocturia, edema, palpitations, dizziness, lightheadedness, syncope, claudication, or snoring/apnea.    Blood pressure today: 138/84 mmHg    Today's ECG shows sinus bradycardia at 51 bpm, 1st degree AVB, normal QRS axis, and normal ventricular repolarization. Prior ECG showed sinus bradycardia at 46 bpm, borderline AV conduction, and normal ventricular repolarization.     Past Medical History:  He has a past medical history of Acquired hypothyroidism, BPH (benign prostatic hyperplasia), Gout, Mixed hyperlipidemia, unclear Paroxysmal atrial fibrillation (CMS/HCC), Raynaud's syndrome without gangrene, and Scrotal mass.     Past Surgical History:  He has a past surgical history that includes Tonsillectomy.    Social History:  He reports that he has never smoked. He has never used smokeless tobacco. He reports current alcohol use of about 1.0 standard drink of alcohol per week. He reports that he does not use drugs.    Family History:  Family History   Problem Relation Name Age of Onset    Other (lung fibrosis) Mother      Other (CABG) Father      No Known Problems Brother       Allergies:  Patient has no known allergies.    Outpatient Medications:  Current Outpatient Medications   Medication Instructions    aspirin 81 mg, oral, Daily    colchicine 0.6 mg, oral, Daily    levothyroxine (SYNTHROID, LEVOXYL) 50 mcg, oral, Every Day    rosuvastatin (CRESTOR) 5 mg, oral, Nightly    tamsulosin (FLOMAX) 0.4 mg, oral, Daily     Last Recorded Vitals:  Vitals:    05/29/24 1114   BP: 138/84   BP Location: Left arm   Patient Position: Sitting   Pulse: 65   SpO2: 96%   Weight: 72.6 kg (160 lb)   Height: 1.727 m (5' 8\")     Physical Exam:      5/29/2024    11:14 AM " "5/24/2024    12:44 AM 5/24/2024    12:00 AM 5/23/2024    11:00 PM 5/23/2024     9:54 PM 4/2/2024    10:26 AM   Vitals   Systolic 138 132 132 140 155    Diastolic 84 71 71 80 76    Heart Rate 65 51 51 50 49 76   Temp     36.8 °C (98.2 °F)    Resp  20 20 21 20 18   Height (in) 1.727 m (5' 8\")    1.727 m (5' 8\")    Weight (lb) 160    160 160.2   BMI 24.33 kg/m2    24.33 kg/m2 24.36 kg/m2   BSA (m2) 1.87 m2    1.87 m2 1.87 m2   Visit Report Report     Report     Wt Readings from Last 5 Encounters:   05/29/24 72.6 kg (160 lb)   05/23/24 72.6 kg (160 lb)   04/02/24 72.7 kg (160 lb 3.2 oz)   04/01/24 72.5 kg (159 lb 12.8 oz)   03/19/24 72.6 kg (160 lb)     General: Sitting up comfortably in chair; in no apparent distress.  HEENT: Normocephalic; atraumatic. Well hydrated.  Eyes: Anicteric sclera. Extraocular movement intact.  Neck: Supple; no thyromegaly; normal jugular venous pressure, no bruits.  Respiratory: Bilateral air entry equal. No wheezing.  Cardiovascular: Normal S1, S2; no murmurs auscultated.  Abdomen: Nondistended; nontender. (+) bowel sounds.  Extremities: No peripheral edema present. Pulses 2+ diffusely.  Neurological: Oriented to time, place, and person; nonfocal.  Psychiatric: Normal affect.     Last Labs Reviewed:  CBC -  Recent Labs     05/23/24 2222 03/20/24 0433 03/19/24  1050 11/30/23  0858 11/25/22  1543   WBC 6.5 5.3 6.6 6.0 6.7   HGB 15.3 14.1 16.2 15.6 15.6   HCT 44.8 42.2 47.6 46.1 46.7    169 174 165 168   MCV 89 88 89 90 91     CMP -  Recent Labs     05/23/24 2222 03/20/24  0433 03/19/24  1215 03/19/24  1050 11/30/23  0858 11/25/22  1543    139  --  139 142 139   K 4.2 4.4  --  4.2 4.6 4.4    105  --  103 105 101   CO2 24 27  --  28 28 30   ANIONGAP 15 11  --  12 14 12   BUN 22 15  --  17 12 15   CREATININE 1.01 1.02  --  1.00 0.94 1.20   EGFR 76 76  --  78 83  --    MG  --  2.00 2.20  --   --   --    CALCIUM 9.5 9.0  --  9.7 9.7 9.6     Recent Labs     05/23/24  9378 " 03/19/24  1050 11/30/23  0858 11/25/22  1543 10/05/21  0840   ALBUMIN 4.5 4.9 4.6 4.7 4.7   ALKPHOS 72 70 58 72 74   ALT 23 15 18 17 18   AST 24 18 17 19 18   BILITOT 1.2 1.4* 1.5* 1.4* 2.0*   LIPASE 21  --   --   --   --      LIPID PANEL -   Recent Labs     03/20/24  0433 11/30/23  0858 11/25/22  1543 10/05/21  0840 12/13/19  1146   CHOL 142 152 174 160 146   LDLF  --   --  72 87 77   LDLCALC 75 79  --   --   --    HDL 37.4 44.0 38.4* 41.3 40.7   TRIG 147 145 318* 160* 142     COAGULATION PANEL -  Recent Labs     03/19/24  1306 01/19/20  1740   INR  --  1.0   DDIMERVTE 321 <215     HEME/ENDO -  Recent Labs     03/19/24  1215 03/19/24  1050 11/30/23  0858 11/25/22  1543 10/05/21  0840 01/19/20  1740 12/13/19  1146 08/24/18  0828   TSH 3.14  --  2.62 3.05 3.07   < > 1.85 1.83   HGBA1C  --  5.9*  --   --   --   --   --   --    FREET4  --   --   --   --   --   --  1.05 0.89    < > = values in this interval not displayed.     CARDIOVASCULAR  Recent Labs     05/23/24  2321 05/23/24  2222 03/19/24  1215 03/19/24  1050 01/20/20  0508 01/19/20  1740   TROPHS 4 4 4 3  --   --    BNP  --  25  --  35 19 19   CRP  --   --  0.13  --   --   --      Last Cardiology/Imaging Tests Personally Reviewed (if images available) and Interpreted:  ECG:  Encounter Date: 05/23/24   ECG 12 lead   Result Value    Ventricular Rate 51    Atrial Rate 51    GA Interval 224    QRS Duration 82    QT Interval 412    QTC Calculation(Bazett) 379    P Axis 73    R Axis -15    T Axis 26    QRS Count 8    Q Onset 222    P Onset 110    P Offset 160    T Offset 428    QTC Fredericia 390    Narrative    Sinus bradycardia with 1st degree AV block      Echocardiogram:  Transthoracic Echo (TTE) Complete 03/20/2024  CONCLUSIONS:   1. Left ventricular systolic function is normal with a 70% estimated ejection fraction.   2. Spectral Doppler shows an impaired relaxation pattern of left ventricular diastolic filling.   3. The left atrium is mildly dilated.      Cath:  Left Heart Cath 1/20/2020  Coronary Angiography:  The coronary circulation is right dominant.     Left Main Coronary Artery:  The left main coronary artery is a normal caliber vessel. The left main arises normally from the left coronary sinus of Valsalva and bifurcates into the LAD and circumflex coronary arteries. The left main coronary artery showed no significant disease or stenosis greater than 30%.     Left Anterior Descending Coronary Artery Distribution:  The left anterior descending coronary artery is a normal caliber vessel. The LAD arises normally from the left main coronary artery. The LAD demonstrated moderate calcification. The mid left anterior descending coronary artery showed 30% stenosis. The 1st diagonal branch is a small caliber vessel. The 1st diagonal branch showed no significant disease or stenosis greater than 30%. The 2nd diagonal branch is a normal caliber vessel. The 2nd diagonal branch demonstrated no significant disease or stenosis greater than 30%.     Circumflex Coronary Artery Distribution:  The circumflex coronary artery is a normal caliber vessel. The circumflex arises normally from the left main coronary artery and terminates in the AV groove. The circumflex revealed no significant disease or stenosis greater than 30% and calcification. The 1st obtuse marginal branch is a very small caliber vessel. The 1st obtuse marginal branch showed no significant disease or stenosis greater than 30%. The 2nd obtuse marginal branch is a normal caliber vessel. The 2nd obtuse marginal branch demonstrated no significant disease or stenosis greater than 30%.     Right Coronary Artery Distribution:     The right coronary artery is a normal caliber vessel. The RCA arises normally from the right sinus of Valsalva. The RCA showed moderate calcification. The mid right coronary artery showed 10 to 30%  stenosis.  ____________________________________________________________________________________  CONCLUSIONS:  1. Nonobstructive CAD in a right dominant system.  2. Mildly elevated LVEDP.  3. No aortic stenosis.     Stress Test:  Treadmill Stress Test 8/11/2023  - The peak heart rate achieved was 134 bpm, which was 94 % of the age predicted target heart rate.  - The resting blood pressure was 122/72 mmHg with a heart rate of 54 bpm. The standing blood pressure was 120/68 mmHg with a heart rate of 61 bpm.  - The patient's functional capacity was above average.  - The patient developed shortness of breath and leg fatigue during the stress exam.  - The symptoms resolved with rest 2 minutes into recovery.  - The blood pressure response was normal.  - There was a 0.5mm ST segment depression in leads II, III and aVF and V4,V5, and V6 during the peak stress period. ST changes resolved in immediate recovery.    Cardiac CT/MRI:  MR cardiac w and wo IV contrast w regadenoson stress for MORPH FUNCT and valve DZ 05/16/2024  Impression   1. Normal LV size and systolic function. Quantitative LVEF 69 %.  2. No evidence of inducible myocardial ischemia using regadenoson stress perfusion imaging.  3. No evidence of myocardial fibrosis, infiltration or infarction based on LGE imaging.  4. Possible focal myocarditis and pericarditis on 2nd review.  5. Normal RV size and systolic function. Quantitative RVEF53%.     CV RISK FACTORS:   # Hypertension: Last BP: 138/84.  # Hyperlipidemia: Last Tchol 142 / LDL No results found for requested labs within last 365 days. / HDL 37.4 / TRIG 147 (3/20/2024:  4:33 AM).  # Type II Diabetes Mellitus: Last A1c 5.9 (3/19/2024: 10:50 AM).  # Obesity: Last BMI: 24.33.  # CKD: Last BUN/Cr (GFR): 22/1.01 (76), 5/23/2024: 10:22 PM.    ASCV RISK:  The 10-year ASCVD risk score (Mukul GHOSH, et al., 2019) is: 32.7%    Values used to calculate the score:      Age: 79 years      Sex: Male      Is Non-  : No      Diabetic: No      Tobacco smoker: No      Systolic Blood Pressure: 138 mmHg      Is BP treated: No      HDL Cholesterol: 37.4 mg/dL      Total Cholesterol: 142 mg/dL    Assessment/Plan   79 y.o. male, with history significant for bradycardia, HLD, CAD, gout, and HypoT4, who visits Cardiology today as a follow up of fatigue and palpitations with new onset exercise related chest pain after stress MRI results. Study was negative for ischemia and his symptoms disappeared with colchicine and MRI showed possible focal pericardial/myocardial disease.     #chest pain/pericarditis/focal myocarditis  #mild CAD  #HLD    Recommendations:  - Continue colchicine 1 tablet a day   - Continue Crestor 5 for an LDL ~70, recheck panel in 6 months  - Patient will follow up with me in the Cardiology office in 6 months or as needed  - I spent 35 minutes assessing the case between pre-charting, face-to-face patient interaction, and documentation    James Calix MD

## 2024-06-03 ENCOUNTER — APPOINTMENT (OUTPATIENT)
Dept: CARDIOLOGY | Facility: CLINIC | Age: 79
End: 2024-06-03
Payer: MEDICARE

## 2024-06-03 PROBLEM — I31.9 PERICARDITIS (HHS-HCC): Status: ACTIVE | Noted: 2024-06-03

## 2024-06-11 ENCOUNTER — APPOINTMENT (OUTPATIENT)
Dept: PRIMARY CARE | Facility: CLINIC | Age: 79
End: 2024-06-11
Payer: MEDICARE

## 2024-06-13 ENCOUNTER — TELEPHONE (OUTPATIENT)
Dept: CARDIOLOGY | Facility: HOSPITAL | Age: 79
End: 2024-06-13
Payer: MEDICARE

## 2024-06-17 ENCOUNTER — PATIENT OUTREACH (OUTPATIENT)
Dept: CARE COORDINATION | Facility: CLINIC | Age: 79
End: 2024-06-17
Payer: MEDICARE

## 2024-08-15 ENCOUNTER — TELEPHONE (OUTPATIENT)
Dept: CARDIOLOGY | Facility: HOSPITAL | Age: 79
End: 2024-08-15
Payer: MEDICARE

## 2024-08-16 DIAGNOSIS — R07.9 CHEST PAIN, UNSPECIFIED TYPE: Primary | ICD-10-CM

## 2024-08-27 ENCOUNTER — APPOINTMENT (OUTPATIENT)
Dept: INTEGRATIVE MEDICINE | Facility: CLINIC | Age: 79
End: 2024-08-27
Payer: MEDICARE

## 2024-08-27 DIAGNOSIS — M99.01 SEGMENTAL AND SOMATIC DYSFUNCTION OF CERVICAL REGION: Primary | ICD-10-CM

## 2024-08-27 DIAGNOSIS — M99.02 SEGMENTAL AND SOMATIC DYSFUNCTION OF THORACIC REGION: ICD-10-CM

## 2024-08-27 DIAGNOSIS — R07.9 CHEST PAIN, UNSPECIFIED TYPE: ICD-10-CM

## 2024-08-27 DIAGNOSIS — M99.00 SEGMENTAL AND SOMATIC DYSFUNCTION OF HEAD REGION: ICD-10-CM

## 2024-08-27 DIAGNOSIS — M47.22 CERVICAL RADICULOPATHY DUE TO DEGENERATIVE JOINT DISEASE OF SPINE: ICD-10-CM

## 2024-08-27 DIAGNOSIS — G58.8: ICD-10-CM

## 2024-08-27 PROCEDURE — 99203 OFFICE O/P NEW LOW 30 MIN: CPT | Performed by: CHIROPRACTOR

## 2024-08-27 PROCEDURE — 98941 CHIROPRACT MANJ 3-4 REGIONS: CPT | Performed by: CHIROPRACTOR

## 2024-08-27 ASSESSMENT — ENCOUNTER SYMPTOMS
NEUROLOGICAL NEGATIVE: 1
GASTROINTESTINAL NEGATIVE: 1
CONSTITUTIONAL NEGATIVE: 1
HEMATOLOGIC/LYMPHATIC NEGATIVE: 1
PSYCHIATRIC NEGATIVE: 1
EYES NEGATIVE: 1

## 2024-08-27 NOTE — PROGRESS NOTES
Subjective   Patient ID: Francisco Lizama is a 79 y.o. male who presents today, August 27, 2024 for a new patient evaluation and treatment of chest pain and .    Medicare     Chiropractic Medicine HPI:  Provacative factors include : running  Palliative factors incude : medications  Pain is described as : burning   Previous treatment for complaint has included : Rx medications  Patient denies : trauma/accidents/injuries/falls (last 6 months), numbness/tingling, bladder weakness, bowel weakness, difficulty walking, instability, radiating pain into the distal extremity, catching, clicking, grinding, popping  Intensity of the pain:    Patient rates least severe pain at 2/10 on a numerical pain scale  Patient rates most severe pain at 10/10 on a numerical pain scale   Completed Oswestry Disability Index prior to visit: yes     Initial exam:   Francisco Lizama presents for evaluation and treatment of chronic chest pain and shortness of breath related to running. 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 However, he states that about 20 years ago, he started experiencing shortness of breath and chest pain intermittently when he would run. He states that in the last 20 years he has had a series of respiratory tests and cardiac tests that have not determined the exact cause of his symptoms. He states that he and his wife practice [employer] daily and he states that in March (2024) he had a flare up of chest pain, burning, and shortness of breath during [facility]. He states that he is on medications for thyroid, pain management and Aspirin. He reports that during his flare  ups, he will be prescribed medications that will help but over time, the effects will wear off and his symptoms will return.          Objective   Review of Systems   Constitutional: Negative.    HENT: Negative.     Eyes: Negative.    Respiratory:  Positive for chest tightness and shortness of breath.    Cardiovascular:  Positive for chest pain.    Gastrointestinal: Negative.    Musculoskeletal:  Positive for myalgias.   Neurological: Negative.    Hematological: Negative.    Psychiatric/Behavioral: Negative.     All other systems reviewed and are negative.    Physical Exam  Neurological:      General: No focal deficit present.      Mental Status: He is alert and oriented to person, place, and time.      Cranial Nerves: No dysarthria or facial asymmetry.      Sensory: Sensation is intact.      Motor: Motor function is intact.      Coordination: Coordination is intact.      Gait: Gait is intact.        Spine Musculoskeletal Exam    Gait    Gait is normal.    Inspection    Cervical Spine    Cervical spine inspection is normal.    Thoracolumbar    Thoracolumbar inspection is normal.    Palpation    Cervical Spine    Cervical spine palpation is normal.    Tenderness: none    Right      Masses: none      Spasms: none      Crepitus: none      Muscle tone: normal    Left      Masses: none      Spasms: none      Crepitus: none      Muscle tone: normal    Thoracolumbar    Thoracolumbar palpation is normal.    Tenderness: none    Right      Masses: none      Spasms: none      Crepitus: none      Muscle tone: normal    Left      Masses: none      Spasms: none      Crepitus: none      Muscle tone: normal    Range of Motion    Cervical Spine       Cervical flexion: normal. Cervical flexion detail: no pain.     Cervical extension: normal. Cervical extension detail: no pain.       Right      Lateral bending: normal. Lateral bending detail: no pain.       Lateral rotation: normal. Lateral rotation detail: no pain.       Left      Lateral bending: normal. Lateral bending detail: no pain.       Lateral rotation: normal. Lateral rotation detail: no pain.      Thoracolumbar       Flexion: normal. Flexion detail: no pain.     Extension: normal. Extension detail: no pain.       Right      Lateral bending: normal. Lateral bending detail: no pain.       Lateral rotation: normal.  Lateral rotation detail: no pain.       Left      Lateral bending: normal. Lateral bending detail: no pain.       Lateral rotation: normal. Lateral rotation detail: no pain.      Strength    Cervical Spine    Cervical spine motor exam is normal.    Thoracolumbar    Thoracolumbar motor exam is normal.       Sensory    Thoracolumbar    Thoracolumbar sensation is normal.    General    Neurological: alert     Orthopedic Tests:  Cervical: Neutral compression negative.  Cervical distraction negative.  VBI Test negative.  Thoracic/Lumbar/Sacrum: Right Ham's Thoracic negative.  Left Ham's Thoracic negative.    Segmental Joint(s): Segmental joint dysfunction was assessed with motion palpation and is identified in the following areas:  Cervical : C4 C5 C6  Thoracic : T1, T2., and T4    Assessment/Plan   Today's Treatment Included: Chiropractic manipulation to the Segmental Joint(s) Cervical : C4 C5 C6  Segmental Joint(s) Thoracic : T1, T2., T3, Right Ribs, and Left Ribs   Treatment Techniques Used : Activator/Tool assisted technique  Integrative Dry Needling (IDN) - Needles in / out:  20.    Soft-tissue mobilization was performed in the following areas:   Pectoralis bilateral and Intercostal bilateral    Treatment Plan:   The patient and I discussed the risks and benefits of Chiropractic Care. Consent for care was given both written and orally by the patient.  Based on the patient's subjective complaints along with the examination findings, it is advised that a course of Chiropractic Treatment by initiated in the form of:   Chiropractic Manipulation (CMT)  Neuro-Muscular Re-education  Integrative Dry Needing (IDN)  Chiropractic treatment recommended at a frequency of 1 times per week for 4 weeks in conjunction with other providers and treatment modalities.  The goals of the treatment will be to: decrease pain, increase activity, improve quality of life, return to athletic performance, decrease muscular hypertonicity,  increase functional capacity, and improve postural strength  I have placed orders for the following:  Cervical X-ray(s) and IMC  The patient tolerated today's treatment with little or no additional discomfort and was instructed to contact the office for questions or concerns.   Follow up as scheduled.

## 2024-09-03 ENCOUNTER — HOSPITAL ENCOUNTER (OUTPATIENT)
Dept: RADIOLOGY | Facility: HOSPITAL | Age: 79
Discharge: HOME | End: 2024-09-03
Payer: MEDICARE

## 2024-09-03 DIAGNOSIS — M47.22 CERVICAL RADICULOPATHY DUE TO DEGENERATIVE JOINT DISEASE OF SPINE: ICD-10-CM

## 2024-09-03 DIAGNOSIS — G58.8: ICD-10-CM

## 2024-09-03 PROCEDURE — 72050 X-RAY EXAM NECK SPINE 4/5VWS: CPT | Performed by: RADIOLOGY

## 2024-09-03 PROCEDURE — 72050 X-RAY EXAM NECK SPINE 4/5VWS: CPT

## 2024-09-03 ASSESSMENT — ENCOUNTER SYMPTOMS
SHORTNESS OF BREATH: 1
CHEST TIGHTNESS: 1
MYALGIAS: 1

## 2024-09-17 ENCOUNTER — APPOINTMENT (OUTPATIENT)
Dept: INTEGRATIVE MEDICINE | Facility: CLINIC | Age: 79
End: 2024-09-17
Payer: MEDICARE

## 2024-09-24 ENCOUNTER — APPOINTMENT (OUTPATIENT)
Dept: INTEGRATIVE MEDICINE | Facility: CLINIC | Age: 79
End: 2024-09-24
Payer: MEDICARE

## 2024-09-24 VITALS
BODY MASS INDEX: 24.55 KG/M2 | OXYGEN SATURATION: 97 % | SYSTOLIC BLOOD PRESSURE: 129 MMHG | WEIGHT: 162 LBS | HEART RATE: 60 BPM | HEIGHT: 68 IN | DIASTOLIC BLOOD PRESSURE: 74 MMHG

## 2024-09-24 DIAGNOSIS — R10.13 EPIGASTRIC PAIN: Primary | ICD-10-CM

## 2024-09-24 PROCEDURE — 99215 OFFICE O/P EST HI 40 MIN: CPT | Performed by: INTERNAL MEDICINE

## 2024-09-24 RX ORDER — PANTOPRAZOLE SODIUM 40 MG/1
40 TABLET, DELAYED RELEASE ORAL
Qty: 30 TABLET | Refills: 3 | Status: SHIPPED | OUTPATIENT
Start: 2024-09-24

## 2024-09-24 NOTE — PATIENT INSTRUCTIONS
Pantoprazole daily - just before breakfast    Limit size of meals    Stay upright for 2 hours after eating

## 2024-09-24 NOTE — ASSESSMENT & PLAN NOTE
Francisco has been experiencing intermittent substernal and epigastric chest discomfort often related to physical exertion.  He extensive cardiac workup over the past year and suspicion is low for ischemia.  He is not coughing or wheezing, and he has had a normal chest x-ray over the past year.  He does admit to acid reflux and uses Tums on a somewhat regular basis.  We discussed the chronic acid reflux, esophageal spasm or perhaps a hiatal hernia could explain all of his symptoms.  He does have audible stomach sounds in his chest.  He is going to begin pantoprazole once daily.  He was given instructions on proper use.  He is going to limit the size of his meals and make sure that he stays upright a full 2 hours after each meal.  He will follow-up in the office in 2 weeks.  We discussed moving forward with a CT scan of his chest if he does not respond to empiric treatment for acid reflux.

## 2024-09-24 NOTE — PROGRESS NOTES
Chief Complaint   Patient presents with    Chest Pain         History Of Present Illness:    Francisco Lizama is a 79 y.o. male presenting for evaluation and management of chest pain.  Francisco has a history of hypothyroidism, hyperlipidemia and BPH.  He has been experiencing anterior chest pressure and a sensation of breathlessness intermittently.  His symptoms are primarily related to physical exertion, but they are intermittent.  He recently did a stress test and was able to walk at a brisk pace up a significant incline without any symptoms.  At other times he will run a lap on an indoor track and have to stop because of chest pressure and a sensation of breathlessness.  He has had extensive evaluation for his symptoms in the recent past including left heart catheterization in January 2020 showing minimal, less than 30%, atherosclerosis in all 3 blood vessels.  Stress test on 8/11/2023 was negative for ischemia, echocardiogram on 3/20/2024 was unremarkable and cardiac MRI 5/16/2024 was suggestive of pericarditis.  He experienced a significant uptick in his pain at the time of his MRI.  D-dimer, troponin, BNP were negative at that time.  He was treated with colchicine and his acute pain diminished.  He is now back to his baseline symptoms of chest pressure and breathlessness.  His symptoms can occur spontaneously.  He reports that he had some chest discomfort while sitting in the waiting room today.  He can walk up the stairs to go to bed without having symptoms, and then when he gets undressed and lies in bed he will develop discomfort and shortness of breath.  He is not coughing or wheezing.  He completed pulmonary function testing in the past and a 6-minute walk test that were unremarkable.  He has had multiple chest x-rays over the past couple of years that did not show any acute findings.  He does experience intermittent heartburn.  He minimizes symptoms of acid reflux, but his wife has observed that he will  "request a Tums on a somewhat regular basis.  He denies pain when he swallows or feeling that food gets stuck when he swallows.  He denies any pain in his back between his shoulder blades.  He denies any other constitutional symptoms.  His mood is good, and he is enjoying group home.  He does baldemar chi on a daily basis and has not experienced anxiety around his current symptoms.      Cardiac MR 5/16/24  LEFT VENTRICLE:   1. Normal LV size and normal systolic function. Quantitative LVEF 69%.   2. No regional wall motion abnormalities.   3. Normal LV wall thickness and normal LV indexed mass.   4. T2 weighted imaging was not performed.   5. Parametric mapping was not performed. ECV could not be calculated.   6. No evidence of LV thrombus.   7. Delayed-enhancement imaging reveals uniformly \"nulled\" myocardium,   signifying that there has been no prior ischemic myocardial damage.   There is also no definite evidence of interstitial fibrosis to   suggest an infiltrative process.     Echocardiogram 3/20/24  CONCLUSIONS:   1. Left ventricular systolic function is normal with a 70% estimated ejection fraction.   2. Spectral Doppler shows an impaired relaxation pattern of left ventricular diastolic filling.    Stress test 8/11/23  Summary:  1. No clinical or diagnostic electrocardiographic evidence for ischemia at a maximal workload.  2. Adequate level of stress achieved.    Left Heart Cath 1/20/20  30% or less stenosis      Active Medical Problems:  Patient Active Problem List    Diagnosis Date Noted    Epigastric pain 09/24/2024    Pericarditis ([facility]) 06/03/2024    Mild coronary artery disease 04/03/2024    Chest pain 03/19/2024    Multiple benign melanocytic nevi 03/19/2024    Peripheral neuralgia 03/19/2024    Raynaud's disease 03/19/2024    [place] (dyspnea on exertion) 03/19/2024    Elevated coronary artery calcium score 03/24/2023    Idiopathic chronic gout of multiple sites without tophus 03/24/2023    Hyperlipidemia " 03/24/2023    Acquired hypothyroidism 03/24/2023    Mild vitamin D deficiency 03/24/2023    Chronic right shoulder pain 03/24/2023    Benign prostatic hyperplasia with weak urinary stream 03/24/2023       Past Medical History:  Past Medical History:   Diagnosis Date    Acquired hypothyroidism     BPH (benign prostatic hyperplasia)     Gout     Kidney stone     Mixed hyperlipidemia     Paroxysmal atrial fibrillation (Multi)     Raynaud's syndrome without gangrene     Scrotal mass        Past Surgical History:  Past Surgical History:   Procedure Laterality Date    TONSILLECTOMY      childhood         Social History:  Social History     Tobacco Use    Smoking status: Never    Smokeless tobacco: Never   Vaping Use    Vaping status: Never Used   Substance Use Topics    Alcohol use: Yes     Alcohol/week: 1.0 standard drink of alcohol     Types: 1 Standard drinks or equivalent per week    Drug use: Never         Family History:  Family History   Problem Relation Name Age of Onset    Other (lung fibrosis) Mother      Other (CABG) Father      No Known Problems Brother      No Known Problems Daughter          Allergies:  Patient has no known allergies.    Outpatient Medications:    Current Outpatient Medications:     aspirin 81 mg chewable tablet, Chew 1 tablet (81 mg) once daily. Do not start before March 21, 2024., Disp: , Rfl:     levothyroxine (Synthroid, Levoxyl) 50 mcg tablet, Take 1 tablet (50 mcg) by mouth once every day., Disp: 90 tablet, Rfl: 3    tamsulosin (Flomax) 0.4 mg 24 hr capsule, Take 1 capsule (0.4 mg) by mouth once daily. (Patient taking differently: Take 1 capsule (0.4 mg) by mouth once daily at bedtime.), Disp: 90 capsule, Rfl: 3    colchicine 0.6 mg tablet, Take 1 tablet (0.6 mg) by mouth once daily. (Patient not taking: Reported on 9/24/2024), Disp: 90 tablet, Rfl: 3    pantoprazole (ProtoNix) 40 mg EC tablet, Take 1 tablet (40 mg) by mouth once daily in the morning. Take before meals. Do not crush,  "chew, or split., Disp: 30 tablet, Rfl: 3    rosuvastatin (Crestor) 5 mg tablet, Take 1 tablet (5 mg) by mouth once daily at bedtime. (Patient not taking: Reported on 9/24/2024), Disp: 90 tablet, Rfl: 3      Review of Systems:  Pertinent positives in review of systems outlined above.  Complete ROS otherwise negative.          Last Recorded Vitals:  Vitals:    09/24/24 0914   BP: 129/74   BP Location: Left arm   Patient Position: Sitting   BP Cuff Size: Adult   Pulse: 60   SpO2: 97%   Weight: 73.5 kg (162 lb)   Height: 1.727 m (5' 8\")   Body mass index is 24.63 kg/m².        Physical Exam  HENT:      Mouth/Throat:      Pharynx: Oropharynx is clear.   Eyes:      Extraocular Movements: Extraocular movements intact.      Conjunctiva/sclera: Conjunctivae normal.      Pupils: Pupils are equal, round, and reactive to light.   Cardiovascular:      Rate and Rhythm: Normal rate and regular rhythm.      Pulses: Normal pulses.      Heart sounds: Normal heart sounds.   Pulmonary:      Effort: Pulmonary effort is normal.      Breath sounds: Normal breath sounds.      Comments: Stomach gurgling audible in chest.   Abdominal:      General: Abdomen is flat. Bowel sounds are normal.      Palpations: Abdomen is soft.      Comments: No epigastric tenderness to deep palpation    Musculoskeletal:      Right lower leg: No edema.      Left lower leg: No edema.   Neurological:      General: No focal deficit present.            The 10-year ASCVD risk score (Mukul DK, et al., 2019) is: 29.7%    Values used to calculate the score:      Age: 79 years      Sex: Male      Is Non- : No      Diabetic: No      Tobacco smoker: No      Systolic Blood Pressure: 129 mmHg      Is BP treated: No      HDL Cholesterol: 37.4 mg/dL      Total Cholesterol: 142 mg/dL      Assessment/Plan   Problem List Items Addressed This Visit       Epigastric pain - Primary     Francisco has been experiencing intermittent substernal and epigastric chest " discomfort often related to physical exertion.  He extensive cardiac workup over the past year and suspicion is low for ischemia.  He is not coughing or wheezing, and he has had a normal chest x-ray over the past year.  He does admit to acid reflux and uses Tums on a somewhat regular basis.  We discussed the chronic acid reflux, esophageal spasm or perhaps a hiatal hernia could explain all of his symptoms.  He does have audible stomach sounds in his chest.  He is going to begin pantoprazole once daily.  He was given instructions on proper use.  He is going to limit the size of his meals and make sure that he stays upright a full 2 hours after each meal.  He will follow-up in the office in 2 weeks.  We discussed moving forward with a CT scan of his chest if he does not respond to empiric treatment for acid reflux.         Relevant Medications    pantoprazole (ProtoNix) 40 mg EC tablet         A total of 60 minutes was spent reviewing the chart and recent testing and discussing plan of care.     Bobo Gonsalez MD

## 2024-10-01 ENCOUNTER — APPOINTMENT (OUTPATIENT)
Dept: DERMATOLOGY | Facility: CLINIC | Age: 79
End: 2024-10-01
Payer: MEDICARE

## 2024-10-10 ENCOUNTER — TELEPHONE (OUTPATIENT)
Dept: PRIMARY CARE | Facility: CLINIC | Age: 79
End: 2024-10-10
Payer: MEDICARE

## 2024-10-10 DIAGNOSIS — R06.02 SHORTNESS OF BREATH: Primary | ICD-10-CM

## 2024-10-10 NOTE — TELEPHONE ENCOUNTER
I spoke with Francisco.  He has been taking pantoprazole daily and severity of pain has diminished.  It has not abated. Still with chest pain that can occur at rest and with exertion.   Substernal discomfort and shortness of breath.  Feels washed out when it happens.  I placed order for CTA to rule out PE.  CT will also detect hiatal hernia if present.  He will go for BMP 2d prior to scan

## 2024-10-18 ENCOUNTER — LAB (OUTPATIENT)
Dept: LAB | Facility: LAB | Age: 79
End: 2024-10-18
Payer: MEDICARE

## 2024-10-18 DIAGNOSIS — R20.2 PARESTHESIA: ICD-10-CM

## 2024-10-18 DIAGNOSIS — R06.02 SHORTNESS OF BREATH: ICD-10-CM

## 2024-10-18 LAB
ANION GAP SERPL CALC-SCNC: 12 MMOL/L (ref 10–20)
BUN SERPL-MCNC: 16 MG/DL (ref 6–23)
CALCIUM SERPL-MCNC: 9.9 MG/DL (ref 8.6–10.6)
CHLORIDE SERPL-SCNC: 107 MMOL/L (ref 98–107)
CO2 SERPL-SCNC: 25 MMOL/L (ref 21–32)
CREAT SERPL-MCNC: 0.96 MG/DL (ref 0.5–1.3)
EGFRCR SERPLBLD CKD-EPI 2021: 81 ML/MIN/1.73M*2
ERYTHROCYTE [SEDIMENTATION RATE] IN BLOOD BY WESTERGREN METHOD: 9 MM/H (ref 0–20)
GLUCOSE SERPL-MCNC: 85 MG/DL (ref 74–99)
POTASSIUM SERPL-SCNC: 4.4 MMOL/L (ref 3.5–5.3)
SODIUM SERPL-SCNC: 140 MMOL/L (ref 136–145)
VIT B12 SERPL-MCNC: 251 PG/ML (ref 211–911)

## 2024-10-18 PROCEDURE — 80048 BASIC METABOLIC PNL TOTAL CA: CPT

## 2024-10-18 PROCEDURE — 85652 RBC SED RATE AUTOMATED: CPT

## 2024-10-18 PROCEDURE — 36415 COLL VENOUS BLD VENIPUNCTURE: CPT

## 2024-10-18 PROCEDURE — 82607 VITAMIN B-12: CPT

## 2024-10-31 ENCOUNTER — HOSPITAL ENCOUNTER (OUTPATIENT)
Dept: RADIOLOGY | Facility: HOSPITAL | Age: 79
Discharge: HOME | End: 2024-10-31
Payer: MEDICARE

## 2024-10-31 DIAGNOSIS — R06.02 SHORTNESS OF BREATH: ICD-10-CM

## 2024-10-31 PROCEDURE — 71275 CT ANGIOGRAPHY CHEST: CPT | Performed by: RADIOLOGY

## 2024-10-31 PROCEDURE — 2550000001 HC RX 255 CONTRASTS: Performed by: INTERNAL MEDICINE

## 2024-10-31 PROCEDURE — 71275 CT ANGIOGRAPHY CHEST: CPT

## 2024-11-03 ENCOUNTER — PATIENT MESSAGE (OUTPATIENT)
Dept: INTEGRATIVE MEDICINE | Facility: CLINIC | Age: 79
End: 2024-11-03
Payer: MEDICARE

## 2024-11-03 DIAGNOSIS — M62.81 MUSCLE WEAKNESS: Primary | ICD-10-CM

## 2024-11-25 ENCOUNTER — HOSPITAL ENCOUNTER (OUTPATIENT)
Dept: NEUROLOGY | Facility: CLINIC | Age: 79
Discharge: HOME | End: 2024-11-25
Payer: MEDICARE

## 2024-11-25 DIAGNOSIS — M62.81 MUSCLE WEAKNESS: ICD-10-CM

## 2024-11-25 PROCEDURE — 95886 MUSC TEST DONE W/N TEST COMP: CPT | Performed by: PSYCHIATRY & NEUROLOGY

## 2024-11-25 PROCEDURE — 95886 MUSC TEST DONE W/N TEST COMP: CPT | Mod: GC | Performed by: PSYCHIATRY & NEUROLOGY

## 2024-11-25 PROCEDURE — 95908 NRV CNDJ TST 3-4 STUDIES: CPT | Mod: GC | Performed by: PSYCHIATRY & NEUROLOGY

## 2024-11-25 PROCEDURE — 95908 NRV CNDJ TST 3-4 STUDIES: CPT | Performed by: PSYCHIATRY & NEUROLOGY

## 2025-01-08 ENCOUNTER — APPOINTMENT (OUTPATIENT)
Dept: DERMATOLOGY | Facility: CLINIC | Age: 80
End: 2025-01-08
Payer: MEDICARE

## 2025-01-08 DIAGNOSIS — D23.9 DERMATOFIBROMA: ICD-10-CM

## 2025-01-08 DIAGNOSIS — L85.3 XEROSIS CUTIS: ICD-10-CM

## 2025-01-08 DIAGNOSIS — D18.01 CHERRY ANGIOMA: ICD-10-CM

## 2025-01-08 DIAGNOSIS — Z12.83 ENCOUNTER FOR SCREENING FOR MALIGNANT NEOPLASM OF SKIN: ICD-10-CM

## 2025-01-08 DIAGNOSIS — D48.5 NEOPLASM OF UNCERTAIN BEHAVIOR OF SKIN: Primary | ICD-10-CM

## 2025-01-08 DIAGNOSIS — L82.1 SEBORRHEIC KERATOSES: ICD-10-CM

## 2025-01-08 DIAGNOSIS — B35.1 ONYCHOMYCOSIS: ICD-10-CM

## 2025-01-08 DIAGNOSIS — D22.9 MULTIPLE BENIGN MELANOCYTIC NEVI: ICD-10-CM

## 2025-01-08 DIAGNOSIS — L57.8 SUN-DAMAGED SKIN: ICD-10-CM

## 2025-01-08 DIAGNOSIS — L57.0 ACTINIC KERATOSIS: ICD-10-CM

## 2025-01-08 PROCEDURE — 17000 DESTRUCT PREMALG LESION: CPT

## 2025-01-08 PROCEDURE — 11102 TANGNTL BX SKIN SINGLE LES: CPT

## 2025-01-08 PROCEDURE — 17003 DESTRUCT PREMALG LES 2-14: CPT

## 2025-01-08 PROCEDURE — 1159F MED LIST DOCD IN RCRD: CPT | Performed by: DERMATOLOGY

## 2025-01-08 PROCEDURE — 99213 OFFICE O/P EST LOW 20 MIN: CPT | Performed by: DERMATOLOGY

## 2025-01-08 ASSESSMENT — DERMATOLOGY PATIENT ASSESSMENT
HAVE YOU HAD OR DO YOU HAVE A STAPH INFECTION: NO
ARE YOU AN ORGAN TRANSPLANT RECIPIENT: NO
DO YOU USE A TANNING BED: NO
DO YOU HAVE ANY NEW OR CHANGING LESIONS: NO
HAVE YOU HAD OR DO YOU HAVE VASCULAR DISEASE: NO

## 2025-01-08 ASSESSMENT — DERMATOLOGY QUALITY OF LIFE (QOL) ASSESSMENT
RATE HOW EMOTIONALLY BOTHERED YOU ARE BY YOUR SKIN PROBLEM (FOR EXAMPLE, WORRY, EMBARRASSMENT, FRUSTRATION): 0 - NEVER BOTHERED
RATE HOW BOTHERED YOU ARE BY SYMPTOMS OF YOUR SKIN PROBLEM (EG, ITCHING, STINGING BURNING, HURTING OR SKIN IRRITATION): 0 - NEVER BOTHERED
RATE HOW BOTHERED YOU ARE BY EFFECTS OF YOUR SKIN PROBLEMS ON YOUR ACTIVITIES (EG, GOING OUT, ACCOMPLISHING WHAT YOU WANT, WORK ACTIVITIES OR YOUR RELATIONSHIPS WITH OTHERS): 0 - NEVER BOTHERED
ARE THERE EXCLUSIONS OR EXCEPTIONS FOR THE QUALITY OF LIFE ASSESSMENT: NO
DATE THE QUALITY-OF-LIFE ASSESSMENT WAS COMPLETED: 67213

## 2025-01-08 ASSESSMENT — PATIENT GLOBAL ASSESSMENT (PGA): PATIENT GLOBAL ASSESSMENT: PATIENT GLOBAL ASSESSMENT:  1 - CLEAR

## 2025-01-08 ASSESSMENT — ITCH NUMERIC RATING SCALE: HOW SEVERE IS YOUR ITCHING?: 0

## 2025-01-08 NOTE — Clinical Note
Hi- reminder to take a second photo for biopsies that is further out to help with landmark identification.

## 2025-01-08 NOTE — PROGRESS NOTES
Subjective     Francisco Lizama is a 79 y.o. male who presents for the following: Skin Check (Pt voiced no concerns at this time.).     Pt has no complaints. He does not wear sunscreen.    He is asking if there are additional treatments for onychomycosis. He is currently applying ciclopirox cream to the bilateral feet daily. He notes overall improvement, but no resolution.     Skin Cancer History  No skin cancer on file.      Review of Systems:  No other skin or systemic complaints other than what is documented elsewhere in the note.    The following portions of the chart were reviewed this encounter and updated as appropriate:       Specialty Problems          Dermatology Problems    Multiple benign melanocytic nevi     Past Medical History:  Francisco Lizama  has a past medical history of Acquired hypothyroidism, BPH (benign prostatic hyperplasia), Gout, Kidney stone, Mixed hyperlipidemia, Paroxysmal atrial fibrillation (Multi), Raynaud's syndrome without gangrene, and Scrotal mass.    Past Surgical History:  Francisco Lizama  has a past surgical history that includes Tonsillectomy.    Family History:  Patient family history includes CABG in his father; No Known Problems in his brother and daughter; lung fibrosis in his mother.    Social History:  Francisco Lizama  reports that he has never smoked. He has never used smokeless tobacco. He reports current alcohol use of about 1.0 standard drink of alcohol per week. He reports that he does not use drugs.    Allergies:  Patient has no known allergies.    Current Medications / CAM's:    Current Outpatient Medications:     aspirin 81 mg chewable tablet, Chew 1 tablet (81 mg) once daily. Do not start before March 21, 2024., Disp: , Rfl:     levothyroxine (Synthroid, Levoxyl) 50 mcg tablet, Take 1 tablet (50 mcg) by mouth once every day., Disp: 90 tablet, Rfl: 3    tamsulosin (Flomax) 0.4 mg 24 hr capsule, Take 1 capsule (0.4 mg) by mouth once daily. (Patient taking  differently: Take 1 capsule (0.4 mg) by mouth once daily at bedtime.), Disp: 90 capsule, Rfl: 3    colchicine 0.6 mg tablet, Take 1 tablet (0.6 mg) by mouth once daily. (Patient not taking: Reported on 1/8/2025), Disp: 90 tablet, Rfl: 3    pantoprazole (ProtoNix) 40 mg EC tablet, Take 1 tablet (40 mg) by mouth once daily in the morning. Take before meals. Do not crush, chew, or split. (Patient not taking: Reported on 1/8/2025), Disp: 30 tablet, Rfl: 3    rosuvastatin (Crestor) 5 mg tablet, Take 1 tablet (5 mg) by mouth once daily at bedtime. (Patient not taking: Reported on 9/24/2024), Disp: 90 tablet, Rfl: 3     Objective   Well appearing patient in no apparent distress; mood and affect are within normal limits.    A full examination was performed including scalp, head, eyes, ears, nose, lips, neck, chest, axillae, abdomen, back, buttocks, bilateral upper extremities, bilateral lower extremities, hands, feet, fingers, toes, fingernails, and toenails. All findings within normal limits unless otherwise noted below.    - Scattered waxy tan/grey/brown papules with horn cysts    - scattered regular brown macules and papules    - scattered small bright red papules and macules    Dry fine scale on the back     - scattered tan macules, telangiectasias, and general photo-damage    Right mid back  Pink scaly papule. On dermoscopy there is pigment deposition and telangectasias on the lateral aspect          Left Forehead, Mid Forehead, Right Ear  Erythematous macules with gritty scale.    Left 2nd Distal Dorsal Toe, Left 3rd Toenail, Left 4th Proximal Nail fold of Toe, Left 5th Toenail, Left Hallux Toenail, Right 2nd Toenail, Right 3rd Toenail, Right 4th Proximal Nail fold of Toe, Right 5th Distal Dorsal Toe, Right Hallux Toenail  Yellow discoloration to toenails   Subungual hyperkeratosis noted on right fourth toe    Right Lower Leg - Anterior  Firm pink-brown papule that dimples with lateral pressure.          Assessment/Plan   Neoplasm of uncertain behavior of skin  Right mid back    Lesion biopsy  Type of biopsy: tangential    Informed consent: discussed and consent obtained    Timeout: patient name, date of birth, surgical site, and procedure verified    Procedure prep:  Patient was prepped and draped  Anesthesia: the lesion was anesthetized in a standard fashion    Anesthetic:  1% lidocaine w/ epinephrine 1-100,000 local infiltration  Instrument used: DermaBlade    Hemostasis achieved with: aluminum chloride    Outcome: patient tolerated procedure well    Post-procedure details: sterile dressing applied and wound care instructions given    Dressing type: petrolatum and bandage      Specimen 1 - Dermatopathology- DERM LAB  Differential Diagnosis: pigmented BCC  Check Margins Yes/No?:    Comments:    Dermpath Lab: Routine Histopathology (formalin-fixed tissue)    Discussed concern for skin cancer  Recommended biopsy for diagnostic certainty, patient in agreement    Actinic keratosis (3)  Right Ear; Mid Forehead; Left Forehead    - The premalignant nature of the disorder was reviewed and treatment options were reviewed.   - Patient agreeable to treatment with cryotherapy today.  Sites confirmed. Risks and benefits reviewed including but not limited to pain, redness, swelling, blister, scab, healing with hypo or hyperpigmentation, and scar. Chance of recurrence or persistence reviewed.     Destr of lesion - Left Forehead, Mid Forehead, Right Ear  Complexity: simple    Destruction method: cryotherapy    Informed consent: discussed and consent obtained    Lesion destroyed using liquid nitrogen: Yes    Region frozen until ice ball extended beyond lesion: Yes    Cryotherapy cycles:  1  Outcome: patient tolerated procedure well with no complications    Post-procedure details: wound care instructions given      Seborrheic keratoses    Seborrheic keratosis (-es)  - Discussed benign nature and that no treatment is necessary  unless it becomes painful or increases in size. Patient opts for clinical monitoring at this time.      Multiple benign melanocytic nevi    Benign melanocytic nevi  - Discussed benign nature and that no treatment is necessary unless it becomes painful or increases in size. Patient opts for clinical monitoring at this time.    - Sun protective behavior reviewed and encouraged including the use of over-the-counter sunscreen with SPF30+ daily (reapply every 1.5 hours when outdoors), UPF clothing, broad rimmed hats, sunglasses, and avoidance of midday sun. Home skin monitoring encouraged and how to monitor for skin cancer (changing or new moles, new rapidly growing or non-healing lesions) reviewed. Patient encouraged to call with interval concerns or changes.      Patricia angioma    Cherry angioma(s)  - Discussed benign nature and that no treatment is necessary unless it becomes painful or increases in size. Patient opts for clinical monitoring at this time.      Onychomycosis (10)  Left Hallux Toenail; Right Hallux Toenail; Left 2nd Distal Dorsal Toe; Right 2nd Toenail; Left 3rd Toenail; Right 3rd Toenail; Left 4th Proximal Nail fold of Toe; Right 4th Proximal Nail fold of Toe; Right 5th Distal Dorsal Toe; Left 5th Toenail    Onychomycosis- improved  - Patient has been using ciclopirox cream and laquer to the toes daily with some improvement  - Discussed that onychomycosis is difficult to resolve   - Recommend against treatment with terbinafine due to age   - Discussed daily treatment with vinegar soaks diluted in a 1:4 fashion. Recommended using pre-boiled water     Dermatofibroma  Right Lower Leg - Anterior    Dermatofibroma(s)  - Discussed benign nature and that no treatment is necessary unless it becomes painful or increases in size. Patient opts for clinical monitoring at this time.        Xerosis cutis    Xerosis cutis  - Recommend treatment with an OTC moisturizer such as Eucerin.   - For itch can use pramoxine  1% lotion. Discussed that a family member apply to areas on the back or he can buy a lotion applicator from [employer].    Sun-damaged skin    Actinically damaged skin-  - Sun protective behavior reviewed and encouraged including the use of over-the-counter sunscreen with SPF30+ daily (reapply every 1.5 hours when outdoors), UPF clothing, broad rimmed hats, sunglasses, and avoidance of midday sun. Home skin monitoring encouraged and how to monitor for skin cancer (changing or new moles, new rapidly growing or non-healing lesions) reviewed. Patient encouraged to call with interval concerns or changes.      Encounter for screening for malignant neoplasm of skin    Related Procedures  Follow Up In Dermatology - Established Patient       RTC 1 year for full body skin exam    Patient seen and discussed with Dr. Baires,   Justnia Waller MD MPH  PGY-2, Department of Dermatology    I saw and evaluated the patient, participating in the key elements of the service.  I discussed the findings, assessment and plan with the resident and agree with resident’s findings and plan as documented in the resident's note.  I was immediately available for the entirety of the procedure(s) and present for the key and critical portions.     Linda Baires MD

## 2025-01-20 LAB
LAB AP ASR DISCLAIMER: NORMAL
LABORATORY COMMENT REPORT: NORMAL
PATH REPORT.FINAL DX SPEC: NORMAL
PATH REPORT.GROSS SPEC: NORMAL
PATH REPORT.MICROSCOPIC SPEC OTHER STN: NORMAL
PATH REPORT.RELEVANT HX SPEC: NORMAL
PATH REPORT.TOTAL CANCER: NORMAL

## 2025-02-11 ENCOUNTER — APPOINTMENT (OUTPATIENT)
Dept: PRIMARY CARE | Facility: CLINIC | Age: 80
End: 2025-02-11
Payer: MEDICARE

## 2025-02-11 VITALS — SYSTOLIC BLOOD PRESSURE: 123 MMHG | DIASTOLIC BLOOD PRESSURE: 74 MMHG

## 2025-02-11 DIAGNOSIS — R07.9 CHEST PAIN, UNSPECIFIED TYPE: ICD-10-CM

## 2025-02-11 DIAGNOSIS — K21.9 GASTROESOPHAGEAL REFLUX DISEASE WITHOUT ESOPHAGITIS: ICD-10-CM

## 2025-02-11 DIAGNOSIS — R93.1 ELEVATED CORONARY ARTERY CALCIUM SCORE: Primary | ICD-10-CM

## 2025-02-11 DIAGNOSIS — E78.2 MIXED HYPERLIPIDEMIA: ICD-10-CM

## 2025-02-11 PROCEDURE — 99213 OFFICE O/P EST LOW 20 MIN: CPT | Performed by: INTERNAL MEDICINE

## 2025-02-11 RX ORDER — PRAVASTATIN SODIUM 10 MG/1
TABLET ORAL
Qty: 50 TABLET | Refills: 1 | Status: SHIPPED | OUTPATIENT
Start: 2025-02-11 | End: 2025-02-25 | Stop reason: HOSPADM

## 2025-02-11 NOTE — PROGRESS NOTES
Subjective   Patient ID: Francisco Liazma is a 79 y.o. male who presents for No chief complaint on file..    HPI met patient for first time Sipp to differentiate no chest pain no shortness of breath but always some chest pain has not been able to figure everything out but has had multiple evaluations has been off the statin medicine because of the way he feels d/w GI d/w thyroid bowels ok no dysuria    Review of Systems    Objective   There were no vitals taken for this visit.    Physical Exam vital signs noted alert and oriented x 3 NCAT no JVD chest clear to auscultation cor regular rate and rhythm S1-S2 no palpable chest pain abdomen soft nont nond nabs extremities no clubbing cyanosis or edema normal distal pulses    Assessment/Plan     Impression chest pain other diagnoses hyperlipid GERD  Plan review prior laboratory results imaging okay to resume the statin medicine as it appears not likely that this was the cause and then recheck at regular physical examination in 3 months with blood work avoid nsaids, late night eating, ok for ppi  Prinzmetal's angina? as consideration cardiology and testing and ER as needed  Review entire chart (check) and patient to get back with regarding the same regarding his symptoms follow up on other aspects

## 2025-02-17 ENCOUNTER — TELEPHONE (OUTPATIENT)
Dept: PRIMARY CARE | Facility: CLINIC | Age: 80
End: 2025-02-17

## 2025-02-19 ENCOUNTER — OFFICE VISIT (OUTPATIENT)
Dept: PRIMARY CARE | Facility: CLINIC | Age: 80
End: 2025-02-19
Payer: MEDICARE

## 2025-02-19 DIAGNOSIS — R23.2 FLUSHING: ICD-10-CM

## 2025-02-19 DIAGNOSIS — K21.9 GASTROESOPHAGEAL REFLUX DISEASE WITHOUT ESOPHAGITIS: Primary | ICD-10-CM

## 2025-02-19 PROCEDURE — 99213 OFFICE O/P EST LOW 20 MIN: CPT | Performed by: INTERNAL MEDICINE

## 2025-02-19 RX ORDER — PANTOPRAZOLE SODIUM 20 MG/1
20 TABLET, DELAYED RELEASE ORAL 2 TIMES DAILY
Qty: 60 TABLET | Refills: 1 | Status: SHIPPED | OUTPATIENT
Start: 2025-02-19 | End: 2025-04-20

## 2025-02-19 RX ORDER — FAMOTIDINE 20 MG/1
20 TABLET, FILM COATED ORAL NIGHTLY
Qty: 30 TABLET | Refills: 1 | Status: SHIPPED | OUTPATIENT
Start: 2025-02-19 | End: 2025-04-20

## 2025-02-19 NOTE — PROGRESS NOTES
Subjective   Patient ID: Francisco Lizama is a 79 y.o. male who presents for No chief complaint on file..    HPI follow-up visit.  No chest pain no shortness of breath but the same every once in a while welling up on the his chest a burning feeling flush none dyspnea no fever no shortness     to the ER.      No coughing has occasionally tried a Tums bowels seem to be okay no dysuria     no JVD or bruit chest clear to auscultation CV regular rate and rhythm S1-S2    Vital signs noted alert and oriented x 3 NCAT without murmur gallop or rub abdomen soft nontender normal active bowel sounds no rebound or guarding no HSM nontender chest wall extremities no clubbing cyanosis or edema normal distal pulses    Review of Systems    Objective   There were no vitals taken for this visit.    Physical Exam    Assessment/Plan impression GERD flushing  Plan okay for continue with current medications reviewed prior echocardiogram and CT angio okay for avoidance of NSAIDs okay for pantoprazole 20 mg p.o. twice daily before breakfast before dinner x 30 days see EMR and famotidine 20 mg p.o. nightly for 30 days no eating within 3 hours of bedtime care with the diet good water consumption then recheck discussed with cardiovascular medication and/or     further testing based on above    DRAGON ISSUE

## 2025-02-20 VITALS — RESPIRATION RATE: 12 BRPM | HEART RATE: 76 BPM

## 2025-02-21 ENCOUNTER — TELEPHONE (OUTPATIENT)
Dept: PRIMARY CARE | Facility: CLINIC | Age: 80
End: 2025-02-21
Payer: MEDICARE

## 2025-02-22 ENCOUNTER — APPOINTMENT (OUTPATIENT)
Dept: CARDIOLOGY | Facility: HOSPITAL | Age: 80
DRG: 322 | End: 2025-02-22
Payer: MEDICARE

## 2025-02-22 ENCOUNTER — HOSPITAL ENCOUNTER (INPATIENT)
Facility: HOSPITAL | Age: 80
DRG: 322 | End: 2025-02-22
Attending: EMERGENCY MEDICINE | Admitting: INTERNAL MEDICINE
Payer: MEDICARE

## 2025-02-22 ENCOUNTER — APPOINTMENT (OUTPATIENT)
Dept: RADIOLOGY | Facility: HOSPITAL | Age: 80
DRG: 322 | End: 2025-02-22
Payer: MEDICARE

## 2025-02-22 DIAGNOSIS — I25.10 CORONARY ARTERY DISEASE DUE TO CALCIFIED CORONARY LESION: ICD-10-CM

## 2025-02-22 DIAGNOSIS — I21.4 NSTEMI (NON-ST ELEVATED MYOCARDIAL INFARCTION) (MULTI): Primary | ICD-10-CM

## 2025-02-22 DIAGNOSIS — R93.1 ELEVATED CORONARY ARTERY CALCIUM SCORE: ICD-10-CM

## 2025-02-22 DIAGNOSIS — I25.84 CORONARY ARTERY DISEASE DUE TO CALCIFIED CORONARY LESION: ICD-10-CM

## 2025-02-22 DIAGNOSIS — Z95.5 STATUS POST PLACEMENT OF STENT IN RIGHT CORONARY ARTERY: ICD-10-CM

## 2025-02-22 LAB
ALBUMIN SERPL BCP-MCNC: 4.5 G/DL (ref 3.4–5)
ALP SERPL-CCNC: 65 U/L (ref 33–136)
ALT SERPL W P-5'-P-CCNC: 17 U/L (ref 10–52)
ANION GAP SERPL CALC-SCNC: 11 MMOL/L (ref 10–20)
AORTIC VALVE PEAK VELOCITY: 1.37 M/S
AST SERPL W P-5'-P-CCNC: 26 U/L (ref 9–39)
AV PEAK GRADIENT: 8 MMHG
AVA (PEAK VEL): 3.46 CM2
BASOPHILS # BLD AUTO: 0.03 X10*3/UL (ref 0–0.1)
BASOPHILS NFR BLD AUTO: 0.4 %
BILIRUB SERPL-MCNC: 1.3 MG/DL (ref 0–1.2)
BUN SERPL-MCNC: 19 MG/DL (ref 6–23)
CALCIUM SERPL-MCNC: 9.6 MG/DL (ref 8.6–10.3)
CARDIAC TROPONIN I PNL SERPL HS: 1013 NG/L (ref 0–20)
CARDIAC TROPONIN I PNL SERPL HS: 637 NG/L (ref 0–20)
CARDIAC TROPONIN I PNL SERPL HS: 653 NG/L (ref 0–20)
CHLORIDE SERPL-SCNC: 106 MMOL/L (ref 98–107)
CHOLEST SERPL-MCNC: 187 MG/DL (ref 0–199)
CHOLESTEROL/HDL RATIO: 4.8
CO2 SERPL-SCNC: 26 MMOL/L (ref 21–32)
CREAT SERPL-MCNC: 0.97 MG/DL (ref 0.5–1.3)
EGFRCR SERPLBLD CKD-EPI 2021: 80 ML/MIN/1.73M*2
EJECTION FRACTION APICAL 4 CHAMBER: 52.3
EJECTION FRACTION: 58 %
EOSINOPHIL # BLD AUTO: 0.12 X10*3/UL (ref 0–0.4)
EOSINOPHIL NFR BLD AUTO: 1.6 %
ERYTHROCYTE [DISTWIDTH] IN BLOOD BY AUTOMATED COUNT: 12.5 % (ref 11.5–14.5)
GLUCOSE SERPL-MCNC: 90 MG/DL (ref 74–99)
HCT VFR BLD AUTO: 46.6 % (ref 41–52)
HDLC SERPL-MCNC: 39.1 MG/DL
HGB BLD-MCNC: 16.2 G/DL (ref 13.5–17.5)
IMM GRANULOCYTES # BLD AUTO: 0.02 X10*3/UL (ref 0–0.5)
IMM GRANULOCYTES NFR BLD AUTO: 0.3 % (ref 0–0.9)
LDLC SERPL CALC-MCNC: 100 MG/DL
LEFT ATRIUM VOLUME AREA LENGTH INDEX BSA: 25.2 ML/M2
LEFT VENTRICLE INTERNAL DIMENSION DIASTOLE: 3.65 CM (ref 3.5–6)
LEFT VENTRICULAR OUTFLOW TRACT DIAMETER: 2.13 CM
LIPASE SERPL-CCNC: 11 U/L (ref 9–82)
LYMPHOCYTES # BLD AUTO: 2.28 X10*3/UL (ref 0.8–3)
LYMPHOCYTES NFR BLD AUTO: 30 %
MCH RBC QN AUTO: 30.2 PG (ref 26–34)
MCHC RBC AUTO-ENTMCNC: 34.8 G/DL (ref 32–36)
MCV RBC AUTO: 87 FL (ref 80–100)
MITRAL VALVE E/A RATIO: 0.83
MONOCYTES # BLD AUTO: 0.75 X10*3/UL (ref 0.05–0.8)
MONOCYTES NFR BLD AUTO: 9.9 %
NEUTROPHILS # BLD AUTO: 4.41 X10*3/UL (ref 1.6–5.5)
NEUTROPHILS NFR BLD AUTO: 57.8 %
NON HDL CHOLESTEROL: 148 MG/DL (ref 0–149)
NRBC BLD-RTO: 0 /100 WBCS (ref 0–0)
PLATELET # BLD AUTO: 129 X10*3/UL (ref 150–450)
POTASSIUM SERPL-SCNC: 4.5 MMOL/L (ref 3.5–5.3)
PROT SERPL-MCNC: 7 G/DL (ref 6.4–8.2)
RBC # BLD AUTO: 5.37 X10*6/UL (ref 4.5–5.9)
RIGHT VENTRICLE FREE WALL PEAK S': 13 CM/S
RIGHT VENTRICLE PEAK SYSTOLIC PRESSURE: 26.6 MMHG
SODIUM SERPL-SCNC: 138 MMOL/L (ref 136–145)
TRIGL SERPL-MCNC: 239 MG/DL (ref 0–149)
TSH SERPL-ACNC: 3.35 MIU/L (ref 0.44–3.98)
UFH PPP CHRO-ACNC: 0.4 IU/ML
UFH PPP CHRO-ACNC: 0.6 IU/ML
VLDL: 48 MG/DL (ref 0–40)
WBC # BLD AUTO: 7.6 X10*3/UL (ref 4.4–11.3)

## 2025-02-22 PROCEDURE — 93306 TTE W/DOPPLER COMPLETE: CPT

## 2025-02-22 PROCEDURE — 85520 HEPARIN ASSAY: CPT | Performed by: INTERNAL MEDICINE

## 2025-02-22 PROCEDURE — 2500000004 HC RX 250 GENERAL PHARMACY W/ HCPCS (ALT 636 FOR OP/ED)

## 2025-02-22 PROCEDURE — 80061 LIPID PANEL: CPT | Performed by: INTERNAL MEDICINE

## 2025-02-22 PROCEDURE — 84484 ASSAY OF TROPONIN QUANT: CPT | Performed by: EMERGENCY MEDICINE

## 2025-02-22 PROCEDURE — 85025 COMPLETE CBC W/AUTO DIFF WBC: CPT | Performed by: EMERGENCY MEDICINE

## 2025-02-22 PROCEDURE — 99223 1ST HOSP IP/OBS HIGH 75: CPT | Performed by: INTERNAL MEDICINE

## 2025-02-22 PROCEDURE — 2060000001 HC INTERMEDIATE ICU ROOM DAILY

## 2025-02-22 PROCEDURE — 2500000001 HC RX 250 WO HCPCS SELF ADMINISTERED DRUGS (ALT 637 FOR MEDICARE OP): Performed by: EMERGENCY MEDICINE

## 2025-02-22 PROCEDURE — 93306 TTE W/DOPPLER COMPLETE: CPT | Performed by: INTERNAL MEDICINE

## 2025-02-22 PROCEDURE — 2500000002 HC RX 250 W HCPCS SELF ADMINISTERED DRUGS (ALT 637 FOR MEDICARE OP, ALT 636 FOR OP/ED): Performed by: INTERNAL MEDICINE

## 2025-02-22 PROCEDURE — 36415 COLL VENOUS BLD VENIPUNCTURE: CPT | Performed by: EMERGENCY MEDICINE

## 2025-02-22 PROCEDURE — 99291 CRITICAL CARE FIRST HOUR: CPT | Mod: 25 | Performed by: EMERGENCY MEDICINE

## 2025-02-22 PROCEDURE — 83690 ASSAY OF LIPASE: CPT | Performed by: EMERGENCY MEDICINE

## 2025-02-22 PROCEDURE — 93005 ELECTROCARDIOGRAM TRACING: CPT

## 2025-02-22 PROCEDURE — 71046 X-RAY EXAM CHEST 2 VIEWS: CPT | Performed by: RADIOLOGY

## 2025-02-22 PROCEDURE — 80053 COMPREHEN METABOLIC PANEL: CPT | Performed by: EMERGENCY MEDICINE

## 2025-02-22 PROCEDURE — 84484 ASSAY OF TROPONIN QUANT: CPT | Performed by: INTERNAL MEDICINE

## 2025-02-22 PROCEDURE — 84443 ASSAY THYROID STIM HORMONE: CPT | Performed by: INTERNAL MEDICINE

## 2025-02-22 PROCEDURE — 71046 X-RAY EXAM CHEST 2 VIEWS: CPT

## 2025-02-22 RX ORDER — NAPROXEN SODIUM 220 MG/1
324 TABLET, FILM COATED ORAL ONCE
Status: COMPLETED | OUTPATIENT
Start: 2025-02-22 | End: 2025-02-22

## 2025-02-22 RX ORDER — PANTOPRAZOLE SODIUM 40 MG/1
40 TABLET, DELAYED RELEASE ORAL
Status: DISCONTINUED | OUTPATIENT
Start: 2025-02-23 | End: 2025-02-25 | Stop reason: HOSPADM

## 2025-02-22 RX ORDER — HEPARIN SODIUM 10000 [USP'U]/100ML
0-4000 INJECTION, SOLUTION INTRAVENOUS CONTINUOUS
Status: DISCONTINUED | OUTPATIENT
Start: 2025-02-22 | End: 2025-02-24

## 2025-02-22 RX ORDER — LEVOTHYROXINE SODIUM 50 UG/1
50 TABLET ORAL DAILY
Status: DISCONTINUED | OUTPATIENT
Start: 2025-02-22 | End: 2025-02-25 | Stop reason: HOSPADM

## 2025-02-22 RX ORDER — ACETAMINOPHEN 325 MG/1
650 TABLET ORAL EVERY 4 HOURS PRN
Status: DISCONTINUED | OUTPATIENT
Start: 2025-02-22 | End: 2025-02-25 | Stop reason: HOSPADM

## 2025-02-22 RX ORDER — ROSUVASTATIN CALCIUM 20 MG/1
20 TABLET, COATED ORAL NIGHTLY
Status: DISCONTINUED | OUTPATIENT
Start: 2025-02-22 | End: 2025-02-24

## 2025-02-22 RX ORDER — TAMSULOSIN HYDROCHLORIDE 0.4 MG/1
0.4 CAPSULE ORAL 2 TIMES DAILY
Status: DISCONTINUED | OUTPATIENT
Start: 2025-02-22 | End: 2025-02-25 | Stop reason: HOSPADM

## 2025-02-22 RX ORDER — ACETAMINOPHEN 160 MG/5ML
650 SOLUTION ORAL EVERY 4 HOURS PRN
Status: DISCONTINUED | OUTPATIENT
Start: 2025-02-22 | End: 2025-02-25 | Stop reason: HOSPADM

## 2025-02-22 RX ORDER — HEPARIN SODIUM 10000 [USP'U]/100ML
INJECTION, SOLUTION INTRAVENOUS
Status: COMPLETED
Start: 2025-02-22 | End: 2025-02-22

## 2025-02-22 RX ORDER — NAPROXEN SODIUM 220 MG/1
81 TABLET, FILM COATED ORAL DAILY
Status: DISCONTINUED | OUTPATIENT
Start: 2025-02-23 | End: 2025-02-25 | Stop reason: HOSPADM

## 2025-02-22 RX ORDER — POLYETHYLENE GLYCOL 3350 17 G/17G
17 POWDER, FOR SOLUTION ORAL DAILY
Status: DISCONTINUED | OUTPATIENT
Start: 2025-02-22 | End: 2025-02-25 | Stop reason: HOSPADM

## 2025-02-22 RX ORDER — ACETAMINOPHEN 650 MG/1
650 SUPPOSITORY RECTAL EVERY 4 HOURS PRN
Status: DISCONTINUED | OUTPATIENT
Start: 2025-02-22 | End: 2025-02-25 | Stop reason: HOSPADM

## 2025-02-22 RX ORDER — ONDANSETRON HYDROCHLORIDE 2 MG/ML
4 INJECTION, SOLUTION INTRAVENOUS EVERY 8 HOURS PRN
Status: DISCONTINUED | OUTPATIENT
Start: 2025-02-22 | End: 2025-02-25 | Stop reason: HOSPADM

## 2025-02-22 RX ORDER — NITROGLYCERIN 0.4 MG/1
0.4 TABLET SUBLINGUAL EVERY 5 MIN PRN
Status: DISCONTINUED | OUTPATIENT
Start: 2025-02-22 | End: 2025-02-25 | Stop reason: HOSPADM

## 2025-02-22 RX ORDER — ONDANSETRON 4 MG/1
4 TABLET, FILM COATED ORAL EVERY 8 HOURS PRN
Status: DISCONTINUED | OUTPATIENT
Start: 2025-02-22 | End: 2025-02-25 | Stop reason: HOSPADM

## 2025-02-22 RX ADMIN — HEPARIN SODIUM 900 UNITS/HR: 10000 INJECTION, SOLUTION INTRAVENOUS at 14:10

## 2025-02-22 RX ADMIN — TAMSULOSIN HYDROCHLORIDE 0.4 MG: 0.4 CAPSULE ORAL at 20:45

## 2025-02-22 RX ADMIN — ROSUVASTATIN 20 MG: 20 TABLET, FILM COATED ORAL at 20:45

## 2025-02-22 RX ADMIN — ASPIRIN 81 MG 324 MG: 81 TABLET ORAL at 13:59

## 2025-02-22 SDOH — ECONOMIC STABILITY: HOUSING INSECURITY: AT ANY TIME IN THE PAST 12 MONTHS, WERE YOU HOMELESS OR LIVING IN A SHELTER (INCLUDING NOW)?: NO

## 2025-02-22 SDOH — SOCIAL STABILITY: SOCIAL INSECURITY: DO YOU FEEL UNSAFE GOING BACK TO THE PLACE WHERE YOU ARE LIVING?: NO

## 2025-02-22 SDOH — ECONOMIC STABILITY: HOUSING INSECURITY: IN THE LAST 12 MONTHS, WAS THERE A TIME WHEN YOU WERE NOT ABLE TO PAY THE MORTGAGE OR RENT ON TIME?: NO

## 2025-02-22 SDOH — SOCIAL STABILITY: SOCIAL INSECURITY
WITHIN THE LAST YEAR, HAVE YOU BEEN RAPED OR FORCED TO HAVE ANY KIND OF SEXUAL ACTIVITY BY YOUR PARTNER OR EX-PARTNER?: NO

## 2025-02-22 SDOH — SOCIAL STABILITY: SOCIAL INSECURITY: DO YOU FEEL ANYONE HAS EXPLOITED OR TAKEN ADVANTAGE OF YOU FINANCIALLY OR OF YOUR PERSONAL PROPERTY?: NO

## 2025-02-22 SDOH — HEALTH STABILITY: MENTAL HEALTH: HOW OFTEN DO YOU HAVE A DRINK CONTAINING ALCOHOL?: NEVER

## 2025-02-22 SDOH — SOCIAL STABILITY: SOCIAL INSECURITY: WITHIN THE LAST YEAR, HAVE YOU BEEN HUMILIATED OR EMOTIONALLY ABUSED IN OTHER WAYS BY YOUR PARTNER OR EX-PARTNER?: NO

## 2025-02-22 SDOH — HEALTH STABILITY: MENTAL HEALTH: HOW OFTEN DO YOU HAVE SIX OR MORE DRINKS ON ONE OCCASION?: NEVER

## 2025-02-22 SDOH — ECONOMIC STABILITY: FOOD INSECURITY: WITHIN THE PAST 12 MONTHS, YOU WORRIED THAT YOUR FOOD WOULD RUN OUT BEFORE YOU GOT THE MONEY TO BUY MORE.: NEVER TRUE

## 2025-02-22 SDOH — HEALTH STABILITY: MENTAL HEALTH: HOW MANY DRINKS CONTAINING ALCOHOL DO YOU HAVE ON A TYPICAL DAY WHEN YOU ARE DRINKING?: PATIENT DOES NOT DRINK

## 2025-02-22 SDOH — SOCIAL STABILITY: SOCIAL INSECURITY: ABUSE: ADULT

## 2025-02-22 SDOH — SOCIAL STABILITY: SOCIAL INSECURITY: WITHIN THE LAST YEAR, HAVE YOU BEEN AFRAID OF YOUR PARTNER OR EX-PARTNER?: NO

## 2025-02-22 SDOH — ECONOMIC STABILITY: INCOME INSECURITY: IN THE PAST 12 MONTHS HAS THE ELECTRIC, GAS, OIL, OR WATER COMPANY THREATENED TO SHUT OFF SERVICES IN YOUR HOME?: NO

## 2025-02-22 SDOH — SOCIAL STABILITY: SOCIAL INSECURITY: ARE THERE ANY APPARENT SIGNS OF INJURIES/BEHAVIORS THAT COULD BE RELATED TO ABUSE/NEGLECT?: NO

## 2025-02-22 SDOH — ECONOMIC STABILITY: FOOD INSECURITY: WITHIN THE PAST 12 MONTHS, THE FOOD YOU BOUGHT JUST DIDN'T LAST AND YOU DIDN'T HAVE MONEY TO GET MORE.: NEVER TRUE

## 2025-02-22 SDOH — SOCIAL STABILITY: SOCIAL INSECURITY: HAVE YOU HAD ANY THOUGHTS OF HARMING ANYONE ELSE?: NO

## 2025-02-22 SDOH — SOCIAL STABILITY: SOCIAL INSECURITY: HAVE YOU HAD THOUGHTS OF HARMING ANYONE ELSE?: NO

## 2025-02-22 SDOH — ECONOMIC STABILITY: HOUSING INSECURITY: IN THE PAST 12 MONTHS, HOW MANY TIMES HAVE YOU MOVED WHERE YOU WERE LIVING?: 0

## 2025-02-22 SDOH — SOCIAL STABILITY: SOCIAL INSECURITY: DOES ANYONE TRY TO KEEP YOU FROM HAVING/CONTACTING OTHER FRIENDS OR DOING THINGS OUTSIDE YOUR HOME?: NO

## 2025-02-22 SDOH — SOCIAL STABILITY: SOCIAL INSECURITY: HAS ANYONE EVER THREATENED TO HURT YOUR FAMILY OR YOUR PETS?: NO

## 2025-02-22 SDOH — SOCIAL STABILITY: SOCIAL INSECURITY: ARE YOU OR HAVE YOU BEEN THREATENED OR ABUSED PHYSICALLY, EMOTIONALLY, OR SEXUALLY BY ANYONE?: NO

## 2025-02-22 SDOH — ECONOMIC STABILITY: FOOD INSECURITY: HOW HARD IS IT FOR YOU TO PAY FOR THE VERY BASICS LIKE FOOD, HOUSING, MEDICAL CARE, AND HEATING?: NOT HARD AT ALL

## 2025-02-22 SDOH — ECONOMIC STABILITY: TRANSPORTATION INSECURITY: IN THE PAST 12 MONTHS, HAS LACK OF TRANSPORTATION KEPT YOU FROM MEDICAL APPOINTMENTS OR FROM GETTING MEDICATIONS?: NO

## 2025-02-22 ASSESSMENT — COGNITIVE AND FUNCTIONAL STATUS - GENERAL
MOBILITY SCORE: 24
MOBILITY SCORE: 24
DAILY ACTIVITIY SCORE: 24
DAILY ACTIVITIY SCORE: 24
PATIENT BASELINE BEDBOUND: NO

## 2025-02-22 ASSESSMENT — ACTIVITIES OF DAILY LIVING (ADL)
JUDGMENT_ADEQUATE_SAFELY_COMPLETE_DAILY_ACTIVITIES: YES
ADEQUATE_TO_COMPLETE_ADL: YES
JUDGMENT_ADEQUATE_SAFELY_COMPLETE_DAILY_ACTIVITIES: YES
TOILETING: INDEPENDENT
HEARING - RIGHT EAR: HEARING AID
BATHING: INDEPENDENT
BATHING: INDEPENDENT
LACK_OF_TRANSPORTATION: NO
HEARING - RIGHT EAR: HEARING AID
TOILETING: INDEPENDENT
GROOMING: INDEPENDENT
ASSISTIVE_DEVICE: HEARING AID - RIGHT;HEARING AID - LEFT
WALKS IN HOME: INDEPENDENT
PATIENT'S MEMORY ADEQUATE TO SAFELY COMPLETE DAILY ACTIVITIES?: YES
HEARING - LEFT EAR: HEARING AID
FEEDING YOURSELF: INDEPENDENT
ASSISTIVE_DEVICE: EYEGLASSES;HEARING AID - RIGHT;HEARING AID - LEFT
HEARING - LEFT EAR: HEARING AID
DRESSING YOURSELF: INDEPENDENT
FEEDING YOURSELF: INDEPENDENT
GROOMING: INDEPENDENT
ADEQUATE_TO_COMPLETE_ADL: YES
WALKS IN HOME: INDEPENDENT
DRESSING YOURSELF: INDEPENDENT

## 2025-02-22 ASSESSMENT — PATIENT HEALTH QUESTIONNAIRE - PHQ9
1. LITTLE INTEREST OR PLEASURE IN DOING THINGS: NOT AT ALL
2. FEELING DOWN, DEPRESSED OR HOPELESS: NOT AT ALL
SUM OF ALL RESPONSES TO PHQ9 QUESTIONS 1 & 2: 0

## 2025-02-22 ASSESSMENT — PAIN SCALES - GENERAL
PAINLEVEL_OUTOF10: 0 - NO PAIN
PAINLEVEL_OUTOF10: 9
PAINLEVEL_OUTOF10: 10 - WORST POSSIBLE PAIN

## 2025-02-22 ASSESSMENT — LIFESTYLE VARIABLES
SKIP TO QUESTIONS 9-10: 1
AUDIT-C TOTAL SCORE: 0

## 2025-02-22 ASSESSMENT — PAIN - FUNCTIONAL ASSESSMENT
PAIN_FUNCTIONAL_ASSESSMENT: 0-10
PAIN_FUNCTIONAL_ASSESSMENT: 0-10

## 2025-02-22 NOTE — ED PROCEDURE NOTE
Procedure  Critical Care    Performed by: Francisco Boudreaux MD  Authorized by: Francisco Boudreaux MD    Critical care provider statement:     Critical care time (minutes):  30    Critical care time was exclusive of:  Separately billable procedures and treating other patients and teaching time    Critical care was necessary to treat or prevent imminent or life-threatening deterioration of the following conditions:  Cardiac failure    Critical care was time spent personally by me on the following activities:  Evaluation of patient's response to treatment, examination of patient, obtaining history from patient or surrogate, ordering and performing treatments and interventions, ordering and review of laboratory studies, ordering and review of radiographic studies, pulse oximetry, re-evaluation of patient's condition, discussions with consultants and review of old charts    Care discussed with: admitting provider                 Francisco Boudreaux MD  02/22/25 [id number]

## 2025-02-22 NOTE — Clinical Note
Angioplasty of the right coronary artery lesion. Inflation 1: Pressure = 20 usha; Duration = 20 sec.

## 2025-02-22 NOTE — H&P
History Of Present Illness  Francisco Lizama is a 79 y.o. male presenting with chest pain..  81-year-old gentleman following past medical history which includes BPH, hypertension, hypothyroidism and paroxysmal atrial fibrillation, previous Raynaud's phenomenon and history of gout presents with about a week to 10 days history of chronic upper chest discomfort will felt more flight esophageal pain and shortness of breath on going up up and down the steps which were occurring more frequently in the last few days and he saw his PCP and was prescribed famotidine and Protonix.  However since last 4 days she has had intermittent chest discomfort radiating to his neck and left arm and subsequently was brought to the emergency room this morning in ER his EKG revealed inferolateral subendocardial ischemic changes and his first troponin was elevated at 653 and subsequent troponins were 637 and 1013, apparently these findings are consistent with NSTEMI.  He was seen by cardiology and patient has been started on IV heparin infusion he received aspirin and is to be admitted to stepdown unit for further evaluation and left heart catheterization on Monday.  Currently he is hemodynamically stable.  Patient has had bradycardia at around 50 to 55 bpm.  He was not on any beta-blockers prior to admission.       Past Medical History  He has a past medical history of Acquired hypothyroidism, BPH (benign prostatic hyperplasia), Gout, Kidney stone, Mixed hyperlipidemia, Paroxysmal atrial fibrillation (Multi), Raynaud's syndrome without gangrene, and Scrotal mass.  He had a negative MRI Lexiscan for coronary ischemia in April 2024.    Surgical History  He has a past surgical history that includes Tonsillectomy.     Social History  He reports that he has never smoked. He has never used smokeless tobacco. He reports current alcohol use of about 1.0 standard drink of alcohol per week. He reports that he does not use drugs.    Family  History  Family History   Problem Relation Name Age of Onset    Other (lung fibrosis) Mother      Other (CABG) Father      No Known Problems Brother      No Known Problems Daughter          Allergies  Patient has no known allergies.    Review of Systems  COMPLETE REVIEW OF SYSTEMS:    GENERAL: No weight loss, malaise or fevers., SEE HPI  HEENT: Negative for frequent or significant headaches, No changes in hearing or vision, no nose bleeds or other nasal problems  NECK: Negative for lumps, goiter, pain and significant neck swelling  RESPIRATORY: Negative for cough, wheezing or shortness of breath.  CARDIOVASCULAR: See HPI, positive for chest pain, no leg swelling or palpitations.,  History of paroxysmal atrial fibrillation  GI: History of esophageal reflux EKG shows a normal rate and rhythm, normal axis, normal intervals. And normal ST and T wave pattern with no evidence of acute ischemia or other acute findings negative for abdominal discomfort, blood in stools or black stools or change in bowel habits  : No history of dysuria, edema.  MUSCULOSKELETAL: Negative for joint pain or swelling, back pain or muscle pain.  SKIN: Negative for lesions, rash, and itching.  PSYCH: Negative for sleep disturbance, mood disorder and recent psychosocial stressors.  HEMATOLOGY/LYMPHOLOGY: Negative for prolonged bleeding, bruising easily or swollen nodes.  ENDOCRINE: Negative for cold or heat intolerance, polyuria, polydipsia and goiter.  NEURO: No history of headaches, syncope, paralysis, seizures or tremors  All other reviewed and negative other than HPI.         Physical Exam  GENERAL: healthy, alert, no distress, cooperative  SKIN: Skin color, texture, turgor normal. No rashes or lesions.  HEAD/SINUSES: No significant findings.  EYES: PERRLA and EOMI  EARS: external ears normal,  NOSE: Nares normal. Septum midline.  OROPHARYNX: Lips, mucosa, and tongue normal. Teeth and gums normal. Oropharynx normal.  NECK: no jugulovenous  distention, no carotid bruits, carotid pulse normal contour, supple  BACK: No CVAT.  LUNGS: Lungs clear to auscultation. Good diaphragmatic excursion.  CARDIAC: Rate and rhythm is regular with a heart rate of 58 bpm, normal S1 and S2; no rubs, murmurs, or gallops  ABDOMEN: Abdomen soft, non-tender. BS normal. No masses or organomegaly.  EXTREMITIES: Extremities normal. No deformities, edema, clubbing or skin discoloration.  NEURO: Gait not examined. Reflexes normal and , Cranial nerves II-XII intact  PULSES: 2+ radial, 2+ carotid  RECTAL: not done       Last Recorded Vitals  /81 (BP Location: Right arm, Patient Position: Lying)   Pulse 60   Temp 36.5 °C (97.7 °F) (Temporal)   Resp 17   Wt 71.7 kg (158 lb)   SpO2 (!) 93%     Relevant Results      Scheduled medications  [START ON 2/23/2025] aspirin, 81 mg, oral, Daily  levothyroxine, 50 mcg, oral, Every Day  [START ON 2/23/2025] pantoprazole, 40 mg, oral, Daily before breakfast  polyethylene glycol, 17 g, oral, Daily  rosuvastatin, 20 mg, oral, Nightly  [START ON 2/23/2025] tamsulosin, 0.4 mg, oral, Daily      Continuous medications  heparin, 0-4,000 Units/hr, Last Rate: 900 Units/hr (02/22/25 1410)      PRN medications  PRN medications: acetaminophen **OR** acetaminophen **OR** acetaminophen, heparin, nitroglycerin, ondansetron **OR** ondansetron     Results for orders placed or performed during the hospital encounter of 02/22/25 (from the past 96 hours)   CBC and Auto Differential   Result Value Ref Range    WBC 7.6 4.4 - 11.3 x10*3/uL    nRBC 0.0 0.0 - 0.0 /100 WBCs    RBC 5.37 4.50 - 5.90 x10*6/uL    Hemoglobin 16.2 13.5 - 17.5 g/dL    Hematocrit 46.6 41.0 - 52.0 %    MCV 87 80 - 100 fL    MCH 30.2 26.0 - 34.0 pg    MCHC 34.8 32.0 - 36.0 g/dL    RDW 12.5 11.5 - 14.5 %    Platelets 129 (L) 150 - 450 x10*3/uL    Neutrophils % 57.8 40.0 - 80.0 %    Immature Granulocytes %, Automated 0.3 0.0 - 0.9 %    Lymphocytes % 30.0 13.0 - 44.0 %    Monocytes % 9.9 2.0  - 10.0 %    Eosinophils % 1.6 0.0 - 6.0 %    Basophils % 0.4 0.0 - 2.0 %    Neutrophils Absolute 4.41 1.60 - 5.50 x10*3/uL    Immature Granulocytes Absolute, Automated 0.02 0.00 - 0.50 x10*3/uL    Lymphocytes Absolute 2.28 0.80 - 3.00 x10*3/uL    Monocytes Absolute 0.75 0.05 - 0.80 x10*3/uL    Eosinophils Absolute 0.12 0.00 - 0.40 x10*3/uL    Basophils Absolute 0.03 0.00 - 0.10 x10*3/uL   Comprehensive metabolic panel   Result Value Ref Range    Glucose 90 74 - 99 mg/dL    Sodium 138 136 - 145 mmol/L    Potassium 4.5 3.5 - 5.3 mmol/L    Chloride 106 98 - 107 mmol/L    Bicarbonate 26 21 - 32 mmol/L    Anion Gap 11 10 - 20 mmol/L    Urea Nitrogen 19 6 - 23 mg/dL    Creatinine 0.97 0.50 - 1.30 mg/dL    eGFR 80 >60 mL/min/1.73m*2    Calcium 9.6 8.6 - 10.3 mg/dL    Albumin 4.5 3.4 - 5.0 g/dL    Alkaline Phosphatase 65 33 - 136 U/L    Total Protein 7.0 6.4 - 8.2 g/dL    AST 26 9 - 39 U/L    Bilirubin, Total 1.3 (H) 0.0 - 1.2 mg/dL    ALT 17 10 - 52 U/L   Lipase   Result Value Ref Range    Lipase 11 9 - 82 U/L   Troponin I, High Sensitivity, Initial   Result Value Ref Range    Troponin I, High Sensitivity 653 (HH) 0 - 20 ng/L   Troponin, High Sensitivity, 1 Hour   Result Value Ref Range    Troponin I, High Sensitivity 637 (HH) 0 - 20 ng/L   Lipid Panel   Result Value Ref Range    Cholesterol 187 0 - 199 mg/dL    HDL-Cholesterol 39.1 mg/dL    Cholesterol/HDL Ratio 4.8     LDL Calculated 100 (H) <=99 mg/dL    VLDL 48 (H) 0 - 40 mg/dL    Triglycerides 239 (H) 0 - 149 mg/dL    Non HDL Cholesterol 148 0 - 149 mg/dL   TSH with reflex to Free T4 if abnormal   Result Value Ref Range    Thyroid Stimulating Hormone 3.35 0.44 - 3.98 mIU/L   XR chest 2 views    Result Date: 2/22/2025  Interpreted By:  Mikal Sauer, STUDY: XR CHEST 2 VIEWS;  2/22/2025 10:46 am   INDICATION: Signs/Symptoms:chest pain.     COMPARISON: 05/23/2024   ACCESSION NUMBER(S): [id number]   ORDERING CLINICIAN: IRIS MORELAND   FINDINGS:          CARDIOMEDIASTINAL SILHOUETTE: Cardiomediastinal silhouette is normal in size and configuration.   LUNGS: There is minimal atelectasis at the bases. There is no consolidation or effusion   ABDOMEN: No remarkable upper abdominal findings.   BONES: No acute osseous changes.       1. Bibasilar atelectatic changes. No consolidation seen       MACRO: None   Signed by: Mikal Sauer 2/22/2025 10:57 AM Dictation workstation:   [id number]       Assessment/Plan   Assessment & Plan  NSTEMI (non-ST elevated myocardial infarction) (Multi)  79-year-old gentleman with history of hypertension, hypothyroidism, BPH, paroxysmal atrial fibrillation, Raynaud's phenomenon and previous history of gout presents with midsternal chest discomfort radiating across his chest and neck and left arm with intermittent esophageal reflux for last about 8 to 10 days worse over past 3 to 4 days and therefore he presented to the emergency room.  EKG revealed inferolateral subendocardial ischemia and his serum troponin level elevated at 637 653 and 1013 all these findings consistent with NSTEMI.    1.  NSTEMI  EKG consistent with inferolateral subendocardial injury pattern.  Troponins are 637 653 and 1013  Cardiology consultation appreciated  Patient admitted to stepdown and already started on IV heparin and received dose of aspirin, he is hemodynamically stable his heart rate is around 54 bpm therefore no beta-blockers are added at present.  Observe closely over the weekend and possible left heart catheterization on Monday, February 24, 2025.    2.  History of paroxysmal atrial fibrillation, currently in sinus rhythm with sinus bradycardia    3.  Hypertension, blood pressures are stable and acceptable    4.  History of BPH, patient is on tamsulosin which is being held    5.  Hypothyroidism, continue with levothyroxine    6.  Hyperlipidemia with LDL of 100 and HDL of 39 and total cholesterol of 187, patient was taking Crestor 5 mg daily which has  been increased to 20 mg daily.    Reviewed EKG echocardiogram all labs and imaging studies and discussed the plan of treatment with patient's wife and 2 daughters were present at the time of examination.  Cardiology recommendation and consultation appreciated.    Total time spent in examination counseling coordinating care for this patient was greater than 75 minutes.       Flynn Castillo MD

## 2025-02-22 NOTE — ED PROVIDER NOTES
Chief Complaint   Patient presents with    Chest Pain    Sore Throat     History of Present Illness   Francisco Lizama   MRN [id number]    79-year-old presents for evaluation of chest pain.  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  Reports he had an extensive cardiac workup which was reassuring and has stopped exercising due to symptoms.  In the past week or 2 he has experienced severe central chest pain with radiation to the neck and left arm intermittently lasting up to 30 minutes or an hour accompanied by flushing and sometimes shortness of breath.  Most recently experienced this around 1 AM when it woke him from sleep and lasted approximately half an hour.  Saw his primary care physician on 2/19/2025 for the symptoms and was prescribed famotidine and pantoprazole as symptoms were thought to be possibly related to esophageal spasm or reflux.  Currently patient asymptomatic.    Reviewed most recent office visit from 2/19/2025 and history and physical from 3/19/2024.  History of gout, hyperlipidemia, hypothyroidism, nonobstructive coronary artery disease, paroxysmal atrial fibrillation.  Most recent echocardiogram 3/20/2024 LV EF 70% and cardiac stress test on 8/11/2023 with no clinical or diagnostic electrocardiographic evidence for ischemia at maximal workload.  Left heart catheterization on 2/8/2020 with nonobstructive coronary artery disease and a right dominant system.  CT angio chest 10/31/2024 demonstrated normal course and caliber of thoracic aorta without vascular calcifications and no pulmonary emboli.    Physical Exam   T 36.8 °C (98.2 °F)  HR 58  /75  RR 16  O2 98 % None (Room air)    General:  No acute distress.  Head: Atraumatic.  Eyes: No conjunctival injection.   Ears Nose Throat: No epistaxis. Oral mucosa moist.  Neck: Supple.  Cardiovascular: Extremities warm.  Regular rhythm.  Borderline  bradycardia.  No lower leg edema.  No JVD.  Pulmonary: No stridor. Normal respiratory effort.  Abdominal: No distention.  Soft and nontender.  Musculoskeletal: No deformity or joint swelling.  Skin: No rash.  Psychiatric: Does not appear to respond to internal stimuli.  Neurologic: Alert. Follows directions. Face symmetric. No aphasia or dysarthria. Symmetric movement of upper and lower extremities.    ED Course & Medical Decision Making   Initial vital signs notable for borderline bradycardia heart rate 58 otherwise reassuring.  Patient initially had no chest discomfort or other symptoms however when I reassessed at approximately 1210 patient reported that he experienced brief chest pain after returning from chest x-ray though again was currently asymptomatic.  Chest x-ray demonstrated no acute cardiopulmonary abnormalities.  Ordered aspirin and heparin infusion given concern for NSTEMI.  Repeat troponin collected.  Cardiology consulted.  I discussed results and plan with patient and family at bedside.    Repeat high-sensitivity troponins down trended slightly.  Cardiologist Dr. Deluna evaluated patient and agrees with plan for admission and further workup.  Discussed with hospitalist and will admit to stepdown.    ED Course as of 02/22/25 1216   Sat Feb 22, 2025   1045 ECG 12 lead  Interpreted by me.  2/22/2025 at 0846.  Sinus bradycardia 57 bpm.  , QRS 86, QTc 416.  No ST elevation.  T wave inversions inferior and V4 through V6 that were not present on previous EKG from 5/23/2024. [MC]      ED Course User Index  [MC] Francisco Boudreaux MD         Diagnoses as of 02/22/25 1216   NSTEMI (non-ST elevated myocardial infarction) (Multi)            Francisco Boudreaux MD  02/22/25 1341

## 2025-02-22 NOTE — Clinical Note
Left detailed message, received letter.  Will scan to chart and review with Dr. Dave upon his return if able to be the ordering provider for echocardiograms. -WENDY   Vessel: RCA. OCT catheter removed.

## 2025-02-22 NOTE — CONSULTS
Inpatient consult to Cardiology  Consult performed by: Codey Deluna DO  Consult ordered by: Francisco Boudreaux MD  Reason for consult: NSTEMI        History Of Present Illness:    Francisco Lizama is a 79 y.o. male with history of bradycardia, dyslipidemia, trivial CAD on catheterization 2020, gout    History taken in the presence of wife and daughter  He reports over the last 5 days or so some stuttering intermittent chest heaviness/pressure that is center chest radiating up into neck and jaw and a couple episodes down his left arm.  The most recent episode on the left arm was a couple of days ago.  This morning he woke up with 10/10 chest pressure radiating up to the neck and jaw and decided to come in for evaluation.  That episode lasted about 20 minutes and spontaneously resolved.  Says he had another episode here while in the ER that spontaneously resolved.  He has not been given any nitroglycerin.  He has no provoking factors and each episode tends to occur at rest.  Is not provoked by eating or drinking.  Nonpleuritic.  He has had atypical chest pain previously and says this is much more intense than anything he has ever felt and is different than anything he has ever felt before.    Workup thus far in the ER shows normal CBC and CMP with normal renal function.  Initial troponin elevated at 653 (previously normal less than a year ago) and 1 hour repeat 637.  ECG shows new T wave inversions in the inferior and lateral leads that was not seen on EKG in May 2024.  He was given aspirin and heparin in the ER and cardiology consult.    He was last seen by cardiology Dr. Chau in May 2024 for atypical chest pain and palpitations.  Monitor was reviewed showing no significant arrhythmias.  He underwent a cardiac stress Lexiscan MRI that ruled out ischemia was also negative for pericarditis.  On secondary review there was some possible focal myocarditis and reportedly was treated with colchicine with improvement in  symptoms.    Past Cardiology Tests (Last 3 Years):  EK2025: Sinus bradycardia with HR 57 bpm, T wave inversions 2, 3, aVF, V4-V6.  T wave inversions new compared to prior ECG 2024    Holter 2024: No significant arrhythmias.  Paroxysmal SVT with the longest lasting 15 beats    Echo:  3/20/2024: LVEF 70%, stage I DD    : LVEF 60-65%, normal RVSP  Cath:  [facility] 2020: Minimal nonobstructive CAD  Stress Test:  Lexiscan stress cardiac MRI 2024:  1. Normal LV size and systolic function. Quantitative LVEF 69 %.   2. No evidence of inducible myocardial ischemia using regadenoson   stress perfusion imaging.   3. No evidence of myocardial fibrosis, infiltration or infarction   based on LGE imaging.   4. Normal RV size and systolic function. Quantitative RVEF53%.     Cardiac Imaging:      Past Medical History:  He has a past medical history of Acquired hypothyroidism, BPH (benign prostatic hyperplasia), Gout, Kidney stone, Mixed hyperlipidemia, Paroxysmal atrial fibrillation (Multi), Raynaud's syndrome without gangrene, and Scrotal mass.    Past Surgical History:  He has a past surgical history that includes Tonsillectomy.      Social History:  He reports that he has never smoked. He has never used smokeless tobacco. He reports current alcohol use of about 1.0 standard drink of alcohol per week. He reports that he does not use drugs.    Family History:  Family History   Problem Relation Name Age of Onset    Other (lung fibrosis) Mother      Other (CABG) Father      No Known Problems Brother      No Known Problems Daughter          Allergies:  Patient has no known allergies.    ROS:  10 point review of systems including (Constitutional, Eyes, ENMT, Respiratory, Cardiac, Gastrointestinal, Neurological, Psychiatric, and Hematologic) was performed and is otherwise negative.    Objective Data:  Last Recorded Vitals:  Vitals:    25 1129 25 1155 25 1200 25 1212   BP: 151/86 119/67 125/78  "125/78   BP Location: Right arm Right arm  Right arm   Patient Position: Lying Lying  Lying   Pulse: (!) 48 (!) 49 54 50   Resp: 18 17 16 14   Temp: 36.5 °C (97.7 °F)      TempSrc: Temporal      SpO2: 95% 94% 94% 96%   Weight:       Height:         Medical Gas Therapy: None (Room air)  Weight  Av.1 kg (159 lb)  Min: 71.7 kg (158 lb)  Max: 72.6 kg (160 lb)      LABS:  CMP:  Results from last 7 days   Lab Units 25  1047   SODIUM mmol/L 138   POTASSIUM mmol/L 4.5   CHLORIDE mmol/L 106   CO2 mmol/L 26   ANION GAP mmol/L 11   BUN mg/dL 19   CREATININE mg/dL 0.97   EGFR mL/min/1.73m*2 80   ALBUMIN g/dL 4.5   ALT U/L 17   AST U/L 26   BILIRUBIN TOTAL mg/dL 1.3*   LIPASE U/L 11     CBC:  Results from last 7 days   Lab Units 25  1047   WBC AUTO x10*3/uL 7.6   HEMOGLOBIN g/dL 16.2   HEMATOCRIT % 46.6   PLATELETS AUTO x10*3/uL 129*   MCV fL 87     COAG:     ABO: No results found for: \"ABO\"  HEME/ENDO:     CARDIAC:   Results from last 7 days   Lab Units 25  1148 25  1047   TROPHS ng/L 637* 653*             Last I/O:  No intake or output data in the 24 hours ending 25 1243  Net IO Since Admission: No IO data has been entered for this period [25 1243]      Imaging Results:  XR chest 2 views    Result Date: 2025  Interpreted By:  Mikal Sauer, STUDY: XR CHEST 2 VIEWS;  2025 10:46 am   INDICATION: Signs/Symptoms:chest pain.     COMPARISON: 2024   ACCESSION NUMBER(S): [id number]   ORDERING CLINICIAN: IRIS MORELAND   FINDINGS:         CARDIOMEDIASTINAL SILHOUETTE: Cardiomediastinal silhouette is normal in size and configuration.   LUNGS: There is minimal atelectasis at the bases. There is no consolidation or effusion   ABDOMEN: No remarkable upper abdominal findings.   BONES: No acute osseous changes.       1. Bibasilar atelectatic changes. No consolidation seen       MACRO: None   Signed by: Mikal Sauer 2025 10:57 AM Dictation workstation:   " [id number]      Inpatient Medications:  Scheduled medications   Medication Dose Route Frequency    aspirin  324 mg oral Once    heparin  4,000 Units intravenous Once     PRN medications   Medication    heparin     Continuous Medications   Medication Dose Last Rate    heparin  0-4,000 Units/hr         Outpatient Medications:  Current Outpatient Medications   Medication Instructions    aspirin 81 mg, oral, Daily    colchicine 0.6 mg, oral, Daily    famotidine (PEPCID) 20 mg, oral, Nightly    levothyroxine (SYNTHROID, LEVOXYL) 50 mcg, oral, Every Day    pantoprazole (PROTONIX) 40 mg, oral, Daily before breakfast, Do not crush, chew, or split.    pantoprazole (PROTONIX) 20 mg, oral, 2 times daily, Take before breakfast and before dinner Do not crush, chew, or split.    pravastatin (Pravachol) 10 mg tablet 1 p.o. every other day    rosuvastatin (CRESTOR) 5 mg, oral, Nightly    tamsulosin (FLOMAX) 0.4 mg, oral, Daily       Physical Exam:  GENERAL: alert, cooperative, pleasant, in no acute distress  SKIN: warm, dry, no rash.  NECK: no JVD, no JUSTINA  CARDIAC: Regular rate and rhythm with no rubs, murmurs, or gallops  CHEST: Normal respiratory efforts, lungs clear to auscultation bilaterally.  ABDOMEN: soft, nontender, nondistended  EXTREMITIES: no edema  NEURO: Alert and oriented x 3.  Grossly normal.  Moves all 4 extremities.       Assessment/Plan   79 y.o. male with history of bradycardia, dyslipidemia, trivial CAD on catheterization 2020, gout    #NSTEMI  Type I versus type II MI.  May be vasospasm?  New EKG changes with T wave inversions inferolaterally  Troponin relatively flat here so far.  Type I MI until proven otherwise  He has intense symptoms lasting about 20 minutes or less without any clear provokable cause.  Continue aspirin  Increase rosuvastatin to 10 mg  Continue heparin infusion  No beta-blocker due to longstanding chronic bradycardia  Echocardiogram pending  Sublingual nitroglycerin as needed  Admit to  stepdown  Tentative plan for left heart catheterization on Monday.  Can do sooner if becomes more urgent.  I did review case with interventional cardiology on-call who agrees with current plan.  Ok to eat    #Dyslipidemia  Increase rosuvastatin to 10 mg  Recheck lipids          Codey Deluna, DO

## 2025-02-22 NOTE — Clinical Note
Angioplasty of the right coronary artery lesion. Inflation 1: Pressure = 18 usha; Duration = 20 sec. Inflation 2: Pressure = 14 usha; Duration = 20 sec.

## 2025-02-22 NOTE — Clinical Note
Angioplasty of the right coronary artery lesion. Inflation 1: Pressure = 12 usha; Duration = 20 sec.

## 2025-02-22 NOTE — Clinical Note
Angioplasty of the right coronary artery lesion. Inflation 1: Pressure = 14 usha; Duration = 20 sec.

## 2025-02-22 NOTE — Clinical Note
Angioplasty of the right coronary artery lesion. Inflation 1: Pressure = 22 usha; Duration = 20 sec. Inflation 2: Pressure = 22 usha; Duration = 20 sec.

## 2025-02-22 NOTE — ED TRIAGE NOTES
PT is A/Ox4 coming in for chest/ throat pain. Pt has a history of esophageal spasms. Pt stated that he has an intense pain in his chest that shoots up to his heads and down the left arm ,. Pt stated that it was coming and coming. Denies any other symptoms at this time.

## 2025-02-22 NOTE — PROGRESS NOTES
Pharmacy Medication History    Spoke to the patient, couple changes    Source of Information:     Additional concerns with the patient's PTA list.     The following updates were made to the Prior to Admission medication list:     Medications ADDED:     Medications CHANGED:  Protonix 20 mg BID  Flomax now BID  Pravastatin Every other day  Medications REMOVED:     Medications NOT TAKING:   Colchicine     Allergy reviewed : Yes    Meds 2 Beds : No    Outpatient pharmacy confirmed and updated in chart : Yes    Pharmacy name: [facility]    The list below reflectives the updated PTA list. Please review each medication in order reconciliation for additional clarification and justification.    Prior to Admission Medications   Prescriptions Last Dose Informant   aspirin 81 mg chewable tablet 2025 Morning    Sig: Chew 1 tablet (81 mg) once daily. Do not start before 2024.   colchicine 0.6 mg tablet     Sig: Take 1 tablet (0.6 mg) by mouth once daily.   Patient not taking: Reported on 2025   famotidine (Pepcid) 20 mg tablet 2025 Bedtime    Sig: Take 1 tablet (20 mg) by mouth once daily at bedtime.   levothyroxine (Synthroid, Levoxyl) 50 mcg tablet 2025 Morning    Sig: Take 1 tablet (50 mcg) by mouth once every day.   pantoprazole (ProtoNix) 40 mg EC tablet     Sig: Take 1 tablet (40 mg) by mouth once daily in the morning. Take before meals. Do not crush, chew, or split.   Patient not taking: Reported on 2025   pantoprazole (Protonix) 20 mg EC tablet     Sig: Take 1 tablet (20 mg) by mouth 2 times a day. Take before breakfast and before dinner Do not crush, chew, or split.   Patient taking differently: Take 1 tablet (20 mg) by mouth 2 times a day. Take before breakfast / before dinner   Do not crush, chew, or split.   pravastatin (Pravachol) 10 mg tablet 2025 Morning    Si p.o. every other day   rosuvastatin (Crestor) 5 mg tablet     Sig: Take 1 tablet (5 mg) by mouth once daily at  bedtime.   Patient not taking: Reported on 9/24/2024   tamsulosin (Flomax) 0.4 mg 24 hr capsule 2/22/2025 Morning    Sig: Take 1 capsule (0.4 mg) by mouth once daily.   Patient taking differently: Take 1 capsule (0.4 mg) by mouth 2 times a day. Increased dose to BID      Facility-Administered Medications: None       The list below reflectives the updated allergy list. Please review each documented allergy for additional clarification and justification.    No Known Allergies       02/22/25 at 2:06 PM - Flavia Kern

## 2025-02-23 ENCOUNTER — APPOINTMENT (OUTPATIENT)
Dept: CARDIOLOGY | Facility: HOSPITAL | Age: 80
DRG: 322 | End: 2025-02-23
Payer: MEDICARE

## 2025-02-23 VITALS
OXYGEN SATURATION: 98 % | HEART RATE: 53 BPM | WEIGHT: 158 LBS | RESPIRATION RATE: 16 BRPM | TEMPERATURE: 97.7 F | SYSTOLIC BLOOD PRESSURE: 119 MMHG | DIASTOLIC BLOOD PRESSURE: 69 MMHG | HEIGHT: 68 IN | BODY MASS INDEX: 23.95 KG/M2

## 2025-02-23 LAB
ALBUMIN SERPL BCP-MCNC: 4.2 G/DL (ref 3.4–5)
ALP SERPL-CCNC: 60 U/L (ref 33–136)
ALT SERPL W P-5'-P-CCNC: 19 U/L (ref 10–52)
ANION GAP SERPL CALC-SCNC: 11 MMOL/L (ref 10–20)
AST SERPL W P-5'-P-CCNC: 23 U/L (ref 9–39)
BILIRUB SERPL-MCNC: 1.3 MG/DL (ref 0–1.2)
BUN SERPL-MCNC: 17 MG/DL (ref 6–23)
CALCIUM SERPL-MCNC: 9.1 MG/DL (ref 8.6–10.3)
CARDIAC TROPONIN I PNL SERPL HS: 676 NG/L (ref 0–20)
CARDIAC TROPONIN I PNL SERPL HS: 821 NG/L (ref 0–20)
CHLORIDE SERPL-SCNC: 105 MMOL/L (ref 98–107)
CO2 SERPL-SCNC: 27 MMOL/L (ref 21–32)
CREAT SERPL-MCNC: 0.99 MG/DL (ref 0.5–1.3)
EGFRCR SERPLBLD CKD-EPI 2021: 78 ML/MIN/1.73M*2
GLUCOSE SERPL-MCNC: 109 MG/DL (ref 74–99)
HOLD SPECIMEN: NORMAL
POTASSIUM SERPL-SCNC: 4.3 MMOL/L (ref 3.5–5.3)
PROT SERPL-MCNC: 6.5 G/DL (ref 6.4–8.2)
SODIUM SERPL-SCNC: 139 MMOL/L (ref 136–145)
UFH PPP CHRO-ACNC: 0.4 IU/ML
UFH PPP CHRO-ACNC: 0.4 IU/ML

## 2025-02-23 PROCEDURE — 2500000002 HC RX 250 W HCPCS SELF ADMINISTERED DRUGS (ALT 637 FOR MEDICARE OP, ALT 636 FOR OP/ED): Performed by: INTERNAL MEDICINE

## 2025-02-23 PROCEDURE — 99233 SBSQ HOSP IP/OBS HIGH 50: CPT | Performed by: INTERNAL MEDICINE

## 2025-02-23 PROCEDURE — 2060000001 HC INTERMEDIATE ICU ROOM DAILY

## 2025-02-23 PROCEDURE — 36415 COLL VENOUS BLD VENIPUNCTURE: CPT | Performed by: INTERNAL MEDICINE

## 2025-02-23 PROCEDURE — 84484 ASSAY OF TROPONIN QUANT: CPT | Performed by: INTERNAL MEDICINE

## 2025-02-23 PROCEDURE — 2500000004 HC RX 250 GENERAL PHARMACY W/ HCPCS (ALT 636 FOR OP/ED): Performed by: INTERNAL MEDICINE

## 2025-02-23 PROCEDURE — 93005 ELECTROCARDIOGRAM TRACING: CPT

## 2025-02-23 PROCEDURE — 93010 ELECTROCARDIOGRAM REPORT: CPT | Performed by: INTERNAL MEDICINE

## 2025-02-23 PROCEDURE — 84075 ASSAY ALKALINE PHOSPHATASE: CPT | Performed by: INTERNAL MEDICINE

## 2025-02-23 PROCEDURE — 2500000001 HC RX 250 WO HCPCS SELF ADMINISTERED DRUGS (ALT 637 FOR MEDICARE OP): Performed by: INTERNAL MEDICINE

## 2025-02-23 PROCEDURE — 85520 HEPARIN ASSAY: CPT | Performed by: INTERNAL MEDICINE

## 2025-02-23 PROCEDURE — 99232 SBSQ HOSP IP/OBS MODERATE 35: CPT | Performed by: INTERNAL MEDICINE

## 2025-02-23 RX ORDER — NITROGLYCERIN 20 MG/100ML
10 INJECTION INTRAVENOUS CONTINUOUS
Status: DISCONTINUED | OUTPATIENT
Start: 2025-02-23 | End: 2025-02-24

## 2025-02-23 RX ADMIN — NITROGLYCERIN 0.4 MG: 0.4 TABLET SUBLINGUAL at 08:08

## 2025-02-23 RX ADMIN — LEVOTHYROXINE SODIUM 50 MCG: 0.05 TABLET ORAL at 08:09

## 2025-02-23 RX ADMIN — HEPARIN SODIUM 900 UNITS/HR: 10000 INJECTION, SOLUTION INTRAVENOUS at 08:36

## 2025-02-23 RX ADMIN — NITROGLYCERIN 0.4 MG: 0.4 TABLET SUBLINGUAL at 14:16

## 2025-02-23 RX ADMIN — TAMSULOSIN HYDROCHLORIDE 0.4 MG: 0.4 CAPSULE ORAL at 20:57

## 2025-02-23 RX ADMIN — PANTOPRAZOLE SODIUM 40 MG: 40 TABLET, DELAYED RELEASE ORAL at 06:25

## 2025-02-23 RX ADMIN — NITROGLYCERIN 10 MCG/MIN: 20 INJECTION INTRAVENOUS at 16:18

## 2025-02-23 RX ADMIN — ROSUVASTATIN 20 MG: 20 TABLET, FILM COATED ORAL at 20:57

## 2025-02-23 RX ADMIN — ASPIRIN 81 MG CHEWABLE TABLET 81 MG: 81 TABLET CHEWABLE at 08:09

## 2025-02-23 RX ADMIN — TAMSULOSIN HYDROCHLORIDE 0.4 MG: 0.4 CAPSULE ORAL at 08:09

## 2025-02-23 ASSESSMENT — COGNITIVE AND FUNCTIONAL STATUS - GENERAL
DAILY ACTIVITIY SCORE: 24
MOBILITY SCORE: 24

## 2025-02-23 ASSESSMENT — PAIN SCALES - GENERAL
PAINLEVEL_OUTOF10: 0 - NO PAIN
PAINLEVEL_OUTOF10: 0 - NO PAIN

## 2025-02-23 ASSESSMENT — PAIN - FUNCTIONAL ASSESSMENT
PAIN_FUNCTIONAL_ASSESSMENT: 0-10
PAIN_FUNCTIONAL_ASSESSMENT: 0-10

## 2025-02-23 NOTE — PROGRESS NOTES
"Francisco Lizama is a 79 y.o. male on day 1 of admission presenting with NSTEMI (non-ST elevated myocardial infarction) (Multi).    Subjective   Patient seen and examined.  He had brief episode of chest pain this morning almost 3 episode and after third episode he got a sublingual nitroglycerin and which immediately relieved his chest discomfort, currently sitting out in chair eating lunch and comfortable, blood pressures are stable, not short of breath.  Troponin this morning was greater than 1000.       Objective     Physical Exam  GENERAL:  Alert, no distress, cooperative, comfortable  SKIN:  Skin color, texture, turgor normal. No rashes or lesions.  OROPHARYNX:  Lips, mucosa, and tongue are normal.Teeth and gums, normal. Oropharynx normal.  NECK:  No jugulovenous distention, No carotid bruits, Carotid pulse normal contour, Supple  LUNGS:  Lungs clear to auscultation. Good diaphragmatic excursion.  CARDIAC: Rhythm is regular, normal S1 and S2; no rubs, murmurs, or gallops  ABDOMEN:  Abdomen soft, non-tender, BS normal, No masses or organomegaly  EXTREMETIES:  Extremities normal, no deformities, edema, clubbing or skin discoloration. Good capillary refill., No ulcers  NEURO:  Alert, oriented X 3, Gait normal. Non-focal. PULSES:  2+ radial, 2+ carotid    Last Recorded Vitals  Blood pressure 166/80, pulse 59, temperature 35.9 °C (96.6 °F), temperature source Temporal, resp. rate 18, height 1.727 m (5' 8\"), weight 71.7 kg (158 lb), SpO2 99%.  Intake/Output last 3 Shifts:  I/O last 3 completed shifts:  In: 86.4 (1.2 mL/kg) [I.V.:86.4 (1.2 mL/kg)]  Out: - (0 mL/kg)   Weight: 71.7 kg     Relevant Results  Scheduled medications  aspirin, 81 mg, oral, Daily  levothyroxine, 50 mcg, oral, Daily  pantoprazole, 40 mg, oral, Daily before breakfast  polyethylene glycol, 17 g, oral, Daily  rosuvastatin, 20 mg, oral, Nightly  tamsulosin, 0.4 mg, oral, BID      Continuous medications  heparin, 0-4,000 Units/hr, Last Rate: 900 " Units/hr (02/23/25 0836)      PRN medications  PRN medications: acetaminophen **OR** acetaminophen **OR** acetaminophen, heparin, nitroglycerin, ondansetron **OR** ondansetron     Results for orders placed or performed during the hospital encounter of 02/22/25 (from the past 96 hours)   CBC and Auto Differential   Result Value Ref Range    WBC 7.6 4.4 - 11.3 x10*3/uL    nRBC 0.0 0.0 - 0.0 /100 WBCs    RBC 5.37 4.50 - 5.90 x10*6/uL    Hemoglobin 16.2 13.5 - 17.5 g/dL    Hematocrit 46.6 41.0 - 52.0 %    MCV 87 80 - 100 fL    MCH 30.2 26.0 - 34.0 pg    MCHC 34.8 32.0 - 36.0 g/dL    RDW 12.5 11.5 - 14.5 %    Platelets 129 (L) 150 - 450 x10*3/uL    Neutrophils % 57.8 40.0 - 80.0 %    Immature Granulocytes %, Automated 0.3 0.0 - 0.9 %    Lymphocytes % 30.0 13.0 - 44.0 %    Monocytes % 9.9 2.0 - 10.0 %    Eosinophils % 1.6 0.0 - 6.0 %    Basophils % 0.4 0.0 - 2.0 %    Neutrophils Absolute 4.41 1.60 - 5.50 x10*3/uL    Immature Granulocytes Absolute, Automated 0.02 0.00 - 0.50 x10*3/uL    Lymphocytes Absolute 2.28 0.80 - 3.00 x10*3/uL    Monocytes Absolute 0.75 0.05 - 0.80 x10*3/uL    Eosinophils Absolute 0.12 0.00 - 0.40 x10*3/uL    Basophils Absolute 0.03 0.00 - 0.10 x10*3/uL   Comprehensive metabolic panel   Result Value Ref Range    Glucose 90 74 - 99 mg/dL    Sodium 138 136 - 145 mmol/L    Potassium 4.5 3.5 - 5.3 mmol/L    Chloride 106 98 - 107 mmol/L    Bicarbonate 26 21 - 32 mmol/L    Anion Gap 11 10 - 20 mmol/L    Urea Nitrogen 19 6 - 23 mg/dL    Creatinine 0.97 0.50 - 1.30 mg/dL    eGFR 80 >60 mL/min/1.73m*2    Calcium 9.6 8.6 - 10.3 mg/dL    Albumin 4.5 3.4 - 5.0 g/dL    Alkaline Phosphatase 65 33 - 136 U/L    Total Protein 7.0 6.4 - 8.2 g/dL    AST 26 9 - 39 U/L    Bilirubin, Total 1.3 (H) 0.0 - 1.2 mg/dL    ALT 17 10 - 52 U/L   Lipase   Result Value Ref Range    Lipase 11 9 - 82 U/L   Troponin I, High Sensitivity, Initial   Result Value Ref Range    Troponin I, High Sensitivity 653 (HH) 0 - 20 ng/L   Troponin,  High Sensitivity, 1 Hour   Result Value Ref Range    Troponin I, High Sensitivity 637 (HH) 0 - 20 ng/L   Lipid Panel   Result Value Ref Range    Cholesterol 187 0 - 199 mg/dL    HDL-Cholesterol 39.1 mg/dL    Cholesterol/HDL Ratio 4.8     LDL Calculated 100 (H) <=99 mg/dL    VLDL 48 (H) 0 - 40 mg/dL    Triglycerides 239 (H) 0 - 149 mg/dL    Non HDL Cholesterol 148 0 - 149 mg/dL   TSH with reflex to Free T4 if abnormal   Result Value Ref Range    Thyroid Stimulating Hormone 3.35 0.44 - 3.98 mIU/L   Transthoracic Echo (TTE) Complete   Result Value Ref Range    AV pk hansa 1.37 m/s    LVOT diam 2.13 cm    MV E/A ratio 0.83     LA vol index A/L 25.2 ml/m2    LV EF 58 %    RV free wall pk S' 13.00 cm/s    LVIDd 3.65 cm    RVSP 26.6 mmHg    Aortic Valve Area by Continuity of Peak Velocity 3.46 cm2    AV pk grad 8 mmHg    LV A4C EF 52.3    Troponin I, High Sensitivity   Result Value Ref Range    Troponin I, High Sensitivity 1,013 (HH) 0 - 20 ng/L   Heparin Assay, UFH   Result Value Ref Range    Heparin Unfractionated 0.6 See Comment Below for Therapeutic Ranges IU/mL   Heparin Assay, UFH   Result Value Ref Range    Heparin Unfractionated 0.4 See Comment Below for Therapeutic Ranges IU/mL   Heparin Assay, UFH   Result Value Ref Range    Heparin Unfractionated 0.4 See Comment Below for Therapeutic Ranges IU/mL   Comprehensive metabolic panel   Result Value Ref Range    Glucose 109 (H) 74 - 99 mg/dL    Sodium 139 136 - 145 mmol/L    Potassium 4.3 3.5 - 5.3 mmol/L    Chloride 105 98 - 107 mmol/L    Bicarbonate 27 21 - 32 mmol/L    Anion Gap 11 10 - 20 mmol/L    Urea Nitrogen 17 6 - 23 mg/dL    Creatinine 0.99 0.50 - 1.30 mg/dL    eGFR 78 >60 mL/min/1.73m*2    Calcium 9.1 8.6 - 10.3 mg/dL    Albumin 4.2 3.4 - 5.0 g/dL    Alkaline Phosphatase 60 33 - 136 U/L    Total Protein 6.5 6.4 - 8.2 g/dL    AST 23 9 - 39 U/L    Bilirubin, Total 1.3 (H) 0.0 - 1.2 mg/dL    ALT 19 10 - 52 U/L   Troponin I, High Sensitivity   Result Value Ref  Range    Troponin I, High Sensitivity 821 (HH) 0 - 20 ng/L   Heparin Assay, UFH   Result Value Ref Range    Heparin Unfractionated 0.4 See Comment Below for Therapeutic Ranges IU/mL   Lavender Top   Result Value Ref Range    Extra Tube Hold for add-ons.       Transthoracic Echo (TTE) Complete    Result Date: 2/22/2025   [facility], [address] [place] [address]              Tel [phone] and Fax [phone] TRANSTHORACIC ECHOCARDIOGRAM REPORT  Patient Name:       IRIS Hager Physician:    [id number] Ander Abraham DO Study Date:         2/22/2025           Ordering Provider:    [id number] BEATRIS BEAR MRN/PID:            [id number]            Fellow: Accession#:         [id number]        Nurse: Date of Birth/Age:  1945 / 79 years Sonographer:          Abraham Knowles RDCS Gender assigned at  M                   Additional Staff: Birth: Height:             173.00 cm           Admit Date: Weight:             71.70 kg            Admission Status:     Inpatient -                                                               Routine BSA / BMI:          1.85 m2 / 23.96     Encounter#:           [id number]                     kg/m2 Blood Pressure:     144/81 mmHg         Department Location:  [facility] Non                                                               Invasive Study Type:    TRANSTHORACIC ECHO (TTE) COMPLETE Diagnosis/ICD: Non ST elevation (NSTEMI) myocardial infarction-I21.4 Indication:    Acute Coronary Syndrome: Non-STEMI CPT Code:      Echo Complete w Full [id number] Patient History: Pertinent History: Hyperlipidemia, Dyspnea and Chest Pain. Study Detail: The following Echo studies were performed: 2D, M-Mode, Doppler and               color flow.  PHYSICIAN INTERPRETATION: Left  Ventricle: The left ventricular systolic function is normal, with a visually estimated ejection fraction of 55-60%. There are no regional left ventricular wall motion abnormalities. The left ventricular cavity size is normal. There is mildly increased septal and normal posterior left ventricular wall thickness. There is left ventricular concentric remodeling. Spectral Doppler shows a Grade I (impaired relaxation pattern) of left ventricular diastolic filling with normal left atrial filling pressure. Left Atrium: The left atrial size is normal. Right Ventricle: The right ventricle is normal in size. There is normal right ventricular global systolic function. Right Atrium: The right atrium is normal in size. Aortic Valve: The aortic valve is trileaflet. There is mild aortic valve thickening. There is mild aortic valve regurgitation. The peak instantaneous gradient of the aortic valve is 8 mmHg. Mitral Valve: The mitral valve is mildly thickened. There is mild mitral annular calcification. The peak instantaneous gradient of the mitral valve is 3 mmHg. There is trace mitral valve regurgitation. Tricuspid Valve: The tricuspid valve is structurally normal. There is trace tricuspid regurgitation. The Doppler estimated RVSP is within normal limits at 26.6 mmHg. Pulmonic Valve: The pulmonic valve is structurally normal. There is trace pulmonic valve regurgitation. Pericardium: There is no pericardial effusion noted. Aorta: The aortic root is normal. Systemic Veins: The inferior vena cava appears normal in size. In comparison to the previous echocardiogram(s): There are no prior studies on this patient for comparison purposes.  CONCLUSIONS:  1. The left ventricular systolic function is normal, with a visually estimated ejection fraction of 55-60%.  2. Spectral Doppler shows a Grade I (impaired relaxation pattern) of left ventricular diastolic filling with normal left atrial filling pressure.  3. Right ventricular systolic  pressure is within normal limits.  4. Mild aortic valve regurgitation. QUANTITATIVE DATA SUMMARY:  2D MEASUREMENTS:          Normal Ranges: LAs:             3.90 cm  (2.7-4.0cm) IVSd:            1.08 cm  (0.6-1.1cm) LVPWd:           0.96 cm  (0.6-1.1cm) LVIDd:           3.65 cm  (3.9-5.9cm) LVIDs:           2.58 cm LV Mass Index:   61 g/m2 LVEDV Index:     46 ml/m2 LV % FS          29.4 %  LEFT ATRIUM:                  Normal Ranges: LA Vol A4C:        33.7 ml    (22+/-6mL/m2) LA Vol A2C:        54.0 ml LA Vol BP:         46.6 ml LA Vol Index A4C:  18.2 ml/m2 LA Vol Index A2C:  29.2 ml/m2 LA Vol Index BP:   25.2 ml/m2 LA Area A4C:       13.5 cm2 LA Area A2C:       18.7 cm2 LA Major Axis A4C: 4.6 cm LA Major Axis A2C: 5.5 cm LA Volume Index:   25.2 ml/m2 LA Vol A4C:        30.9 ml LA Vol A2C:        51.0 ml LA Vol Index BSA:  22.1 ml/m2  RIGHT ATRIUM:                 Normal Ranges: RA Vol A4C:        74.2 ml    (8.3-19.5ml) RA Vol Index A4C:  40.1 ml/m2 RA Area A4C:       22.3 cm2 RA Major Axis A4C: 5.7 cm  M-MODE MEASUREMENTS:         Normal Ranges: LAs:                 4.14 cm (2.7-4.0cm)  LV SYSTOLIC FUNCTION:                      Normal Ranges: EF-A4C View:    52 % (>=55%) EF-A2C View:    58 % EF-Biplane:     54 % EF-Visual:      58 % LV EF Reported: 58 %  LV DIASTOLIC FUNCTION:             Normal Ranges: MV Peak E:             0.66 m/s    (0.7-1.2 m/s) MV Peak A:             0.79 m/s    (0.42-0.7 m/s) E/A Ratio:             0.83        (1.0-2.2) MV A Dur:              161.75 msec PulmV Sys Peter:         44.69 cm/s PulmV Velasquez Peter:        42.81 cm/s PulmV S/D Peter:         1.04 PulmV A Revs Peter:      19.67 cm/s PulmV A Revs Dur:      180.78 msec  MITRAL VALVE:          Normal Ranges: MV Vmax:      0.91 m/s (<=1.3m/s) MV peak PG:   3.3 mmHg (<5mmHg) MV mean P.2 mmHg (<2mmHg) MV VTI:       37.13 cm (10-13cm) MV DT:        259 msec (150-240msec)  AORTIC VALVE:           Normal Ranges: AoV Vmax:      1.37 m/s  (<=1.7m/s) AoV Peak P.5 mmHg (<20mmHg) LVOT Max Peter:  1.33 m/s (<=1.1m/s) LVOT VTI:      24.35 cm LVOT Diameter: 2.13 cm  (1.8-2.4cm) AoV Area,Vmax: 3.46 cm2 (2.5-4.5cm2)  RIGHT VENTRICLE: RV Basal 4.00 cm RV Mid   3.60 cm RV Major 7.0 cm RV s'    0.13 m/s  TRICUSPID VALVE/RVSP:          Normal Ranges: Peak TR Velocity:     2.43 m/s RV Syst Pressure:     27 mmHg  (< 30mmHg)  PULMONIC VALVE:          Normal Ranges: PV Accel Time:  95 msec  (>120ms) PV Max Peter:     1.1 m/s  (0.6-0.9m/s) PV Max P.9 mmHg  PULMONARY VEINS: PulmV A Revs Dur: 180.78 msec PulmV A Revs Peter: 19.67 cm/s PulmV Velasquez Peter:   42.81 cm/s PulmV S/D Peter:    1.04 PulmV Sys Peter:    44.69 cm/s  AORTA: Asc Ao Diam 3.54 cm  [id number] Ander Abraham DO Electronically signed on 2025 at 9:12:40 PM  ** Final **     XR chest 2 views    Result Date: 2025  Interpreted By:  Mikal Sauer, STUDY: XR CHEST 2 VIEWS;  2025 10:46 am   INDICATION: Signs/Symptoms:chest pain.     COMPARISON: 2024   ACCESSION NUMBER(S): [id number]   ORDERING CLINICIAN: IRIS MORELAND   FINDINGS:         CARDIOMEDIASTINAL SILHOUETTE: Cardiomediastinal silhouette is normal in size and configuration.   LUNGS: There is minimal atelectasis at the bases. There is no consolidation or effusion   ABDOMEN: No remarkable upper abdominal findings.   BONES: No acute osseous changes.       1. Bibasilar atelectatic changes. No consolidation seen       MACRO: None   Signed by: Mikal Sauer 2025 10:57 AM Dictation workstation:   [id number]           Assessment/Plan   Assessment & Plan  NSTEMI (non-ST elevated myocardial infarction) (Multi)  79-year-old gentleman with history of hypertension, hypothyroidism, BPH, paroxysmal atrial fibrillation, Raynaud's phenomenon and previous history of gout presents with midsternal chest discomfort radiating across his chest and neck and left arm with intermittent esophageal reflux for last about 8 to 10 days worse over  past 3 to 4 days and therefore he presented to the emergency room.  EKG revealed inferolateral subendocardial ischemia and his serum troponin level elevated at [phone] and 1013 all these findings consistent with NSTEMI.    1.  NSTEMI had 2 or 3 episodes of brief chest discomfort this morning, relieved with nitroglycerin.  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  Troponins are 637 653 and 1013  Cardiology on board.  Patient admitted to stepdown and already started on IV heparin and received dose of aspirin, he is hemodynamically stable his heart rate is around 59 bpm therefore no beta-blockers are added at present.  Observe closely over the weekend and possible left heart catheterization on Monday, February 24, 2025.    2.  History of paroxysmal atrial fibrillation, currently in sinus rhythm with sinus bradycardia    3.  Hypertension, blood pressures are stable and acceptable    4.  History of BPH, patient is on tamsulosin which is being held    5.  Hypothyroidism, continue with levothyroxine    6.  Hyperlipidemia with LDL of 100 and HDL of 39 and total cholesterol of 187, patient was taking Crestor 5 mg daily which has been increased to 20 mg daily.    Reviewed EKG echocardiogram all labs and imaging studies and discussed the plan of treatment with patient. Cardiology recommendation and consultation appreciated.       I spent 35 minutes in the professional and overall care of this patient.      Flynn Castillo MD

## 2025-02-23 NOTE — CARE PLAN
The patient's goals for the shift include      The clinical goals for the shift include pt will remain HD stable and no bleeding    Over the shift, the patient did not make progress toward the following goals. Barriers to progression include . Recommendations to address these barriers include   Problem: Pain  Goal: Takes deep breaths with improved pain control throughout the shift  Outcome: Progressing  Goal: Turns in bed with improved pain control throughout the shift  Outcome: Progressing  Goal: Walks with improved pain control throughout the shift  Outcome: Progressing  Goal: Performs ADL's with improved pain control throughout shift  Outcome: Progressing  Goal: Participates in PT with improved pain control throughout the shift  Outcome: Progressing  Goal: Free from opioid side effects throughout the shift  Outcome: Progressing  Goal: Free from acute confusion related to pain meds throughout the shift  Outcome: Progressing     Problem: ACS/CP/NSTEMI/STEMI  Goal: Chest pain managed (free from pain or at acceptable level)  Outcome: Progressing  Goal: Lab values return to normal range  Outcome: Progressing  Goal: Promote self management  Outcome: Progressing  Goal: Serial ECG will return to baseline  Outcome: Progressing  Goal: Verbalize understanding of procedures/devices  Outcome: Progressing  Goal: Wean vasopressors/achieve hemodynamic stability  Outcome: Progressing     Problem: Arrythmia/Dysrhythmia  Goal: Lab values return to normal range  Outcome: Progressing  Goal: No evidence of post procedure complications  Outcome: Progressing  Goal: Promote self management  Outcome: Progressing  Goal: Serial ECG will return to baseline  Outcome: Progressing  Goal: Verbalize understanding of procedures/devices  Outcome: Progressing  Goal: Vital signs return to baseline  Outcome: Progressing  Goal: Care and maintenance of device (specify)  Outcome: Progressing   .

## 2025-02-23 NOTE — ASSESSMENT & PLAN NOTE
79-year-old gentleman with history of hypertension, hypothyroidism, BPH, paroxysmal atrial fibrillation, Raynaud's phenomenon and previous history of gout presents with midsternal chest discomfort radiating across his chest and neck and left arm with intermittent esophageal reflux for last about 8 to 10 days worse over past 3 to 4 days and therefore he presented to the emergency room.  EKG revealed inferolateral subendocardial ischemia and his serum troponin level elevated at 637 653 and 1013 all these findings consistent with NSTEMI.    1.  NSTEMI  EKG consistent with inferolateral subendocardial injury pattern.  Troponins are 637 653 and 1013  Cardiology consultation appreciated  Patient admitted to stepdown and already started on IV heparin and received dose of aspirin, he is hemodynamically stable his heart rate is around 54 bpm therefore no beta-blockers are added at present.  Observe closely over the weekend and possible left heart catheterization on Monday, February 24, 2025.    2.  History of paroxysmal atrial fibrillation, currently in sinus rhythm with sinus bradycardia    3.  Hypertension, blood pressures are stable and acceptable    4.  History of BPH, patient is on tamsulosin which is being held    5.  Hypothyroidism, continue with levothyroxine    6.  Hyperlipidemia with LDL of 100 and HDL of 39 and total cholesterol of 187, patient was taking Crestor 5 mg daily which has been increased to 20 mg daily.    Reviewed EKG echocardiogram all labs and imaging studies and discussed the plan of treatment with patient's wife and 2 daughters were present at the time of examination.  Cardiology recommendation and consultation appreciated.

## 2025-02-23 NOTE — PROGRESS NOTES
"Subjective Data:  3 separate episodes of recurrent chest tightness this morning each lasting about 10 minutes.  He reported another episode while I was talking to him.  Given sublingual nitroglycerin x 1 with complete relief within a few minutes.  Repeat EKG pending.  Peak troponin about 1000 and trending down this morning.       Objective Data:  Last Recorded Vitals:  Vitals:    25 2356 25 0434 25 0812   BP: 113/61 123/73 104/62 166/80   BP Location: Right arm Right arm Right arm Right arm   Patient Position: Lying Lying Lying Sitting   Pulse: 56 57 53 59   Resp: 18 16  18   Temp: 36.6 °C (97.9 °F) 36.4 °C (97.5 °F) 36.5 °C (97.7 °F) 35.9 °C (96.6 °F)   TempSrc: Temporal Temporal Temporal Temporal   SpO2: 98% 93% 96% 99%   Weight:       Height:         Medical Gas Therapy: None (Room air)  Weight  Av.1 kg (159 lb)  Min: 71.7 kg (158 lb)  Max: 72.6 kg (160 lb)    LABS:  CMP:  Results from last 7 days   Lab Units 25  0629 25  1047   SODIUM mmol/L 139 138   POTASSIUM mmol/L 4.3 4.5   CHLORIDE mmol/L 105 106   CO2 mmol/L 27 26   ANION GAP mmol/L 11 11   BUN mg/dL 17 19   CREATININE mg/dL 0.99 0.97   EGFR mL/min/1.73m*2 78 80   ALBUMIN g/dL 4.2 4.5   ALT U/L 19 17   AST U/L 23 26   BILIRUBIN TOTAL mg/dL 1.3* 1.3*   LIPASE U/L  --  11     CBC:  Results from last 7 days   Lab Units 25  1047   WBC AUTO x10*3/uL 7.6   HEMOGLOBIN g/dL 16.2   HEMATOCRIT % 46.6   PLATELETS AUTO x10*3/uL 129*   MCV fL 87     COAG:     ABO: No results found for: \"ABO\"  HEME/ENDO:  Results from last 7 days   Lab Units 25  1148   TSH mIU/L 3.35      CARDIAC:   Results from last 7 days   Lab Units 25  0629 25  1823 25  1148 25  1047   TROPHS ng/L 821* 1,013* 637* 653*      Results from last 7 days   Lab Units 25  1148   LDL CALC mg/dL 100*   VLDL mg/dL 48*   CHOLESTEROL/HDL RATIO  4.8        Last I/O:    Intake/Output Summary (Last 24 hours) at 2025 " 0836  Last data filed at 2/22/2025 2346  Gross per 24 hour   Intake 86.4 ml   Output --   Net 86.4 ml     Net IO Since Admission: 86.4 mL [02/23/25 0836]            Past Cardiology Tests    Echo:  2/23/2025: LVEF 55-60%, stage I DD, normal RVSP, mild AI    Cath:  [facility] 1/20/2020: Minimal nonobstructive CAD  Stress Test:  Lexiscan stress cardiac MRI 5/2024:  1. Normal LV size and systolic function. Quantitative LVEF 69 %.   2. No evidence of inducible myocardial ischemia using regadenoson   stress perfusion imaging.   3. No evidence of myocardial fibrosis, infiltration or infarction   based on LGE imaging.   4. Normal RV size and systolic function. Quantitative RVEF53%.       Inpatient Medications:  Scheduled medications   Medication Dose Route Frequency    aspirin  81 mg oral Daily    levothyroxine  50 mcg oral Daily    pantoprazole  40 mg oral Daily before breakfast    polyethylene glycol  17 g oral Daily    rosuvastatin  20 mg oral Nightly    tamsulosin  0.4 mg oral BID     PRN medications   Medication    acetaminophen    Or    acetaminophen    Or    acetaminophen    heparin    nitroglycerin    ondansetron    Or    ondansetron     Continuous Medications   Medication Dose Last Rate    heparin  0-4,000 Units/hr 900 Units/hr (02/22/25 1410)       Physical Exam:  GENERAL: alert, cooperative, pleasant, in no acute distress  SKIN: warm, dry, no rash.  NECK: no JVD, no JUSTINA  EXTREMITIES: no edema  NEURO: Alert and oriented x 3.  Grossly normal.  Moves all 4 extremities.'       Assessment/Plan   79 y.o. male with history of bradycardia, dyslipidemia, trivial CAD on catheterization 2020, gout     #NSTEMI  Type I versus type II MI.  May be vasospasm?  New EKG changes with T wave inversions inferolaterally  Type I MI until proven otherwise  He has intense symptoms lasting about 20 minutes or less without any clear provokable cause.  Relieved with SL nitro this morning  Continue aspirin  Increase rosuvastatin to 10 mg  Continue  heparin infusion  No beta-blocker due to longstanding chronic bradycardia  Echocardiogram with normal LVEF and no regional wall motion abnormalities  Sublingual nitroglycerin as needed  Tentative plan for left heart catheterization on Monday.  Can do sooner if becomes more urgent.  I did review case with interventional cardiology on-call who agrees with current plan.    #Dyslipidemia  Increase rosuvastatin to 10 mg  Recheck lipids         Codey Deluna, DO

## 2025-02-23 NOTE — ASSESSMENT & PLAN NOTE
79-year-old gentleman with history of hypertension, hypothyroidism, BPH, paroxysmal atrial fibrillation, Raynaud's phenomenon and previous history of gout presents with midsternal chest discomfort radiating across his chest and neck and left arm with intermittent esophageal reflux for last about 8 to 10 days worse over past 3 to 4 days and therefore he presented to the emergency room.  EKG revealed inferolateral subendocardial ischemia and his serum troponin level elevated at 637 653 and 1013 all these findings consistent with NSTEMI.    1.  NSTEMI had 2 or 3 episodes of brief chest discomfort this morning, relieved with nitroglycerin.  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  Troponins are 637 653 and 1013  Cardiology on board.  Patient admitted to stepdown and already started on IV heparin and received dose of aspirin, he is hemodynamically stable his heart rate is around 59 bpm therefore no beta-blockers are added at present.  Observe closely over the weekend and possible left heart catheterization on Monday, February 24, 2025.    2.  History of paroxysmal atrial fibrillation, currently in sinus rhythm with sinus bradycardia    3.  Hypertension, blood pressures are stable and acceptable    4.  History of BPH, patient is on tamsulosin which is being held    5.  Hypothyroidism, continue with levothyroxine    6.  Hyperlipidemia with LDL of 100 and HDL of 39 and total cholesterol of 187, patient was taking Crestor 5 mg daily which has been increased to 20 mg daily.    Reviewed EKG echocardiogram all labs and imaging studies and discussed the plan of treatment with patient. Cardiology recommendation and consultation appreciated.

## 2025-02-23 NOTE — CARE PLAN
Problem: Pain  Goal: Takes deep breaths with improved pain control throughout the shift  Outcome: Progressing  Goal: Turns in bed with improved pain control throughout the shift  Outcome: Progressing  Goal: Walks with improved pain control throughout the shift  Outcome: Progressing  Goal: Performs ADL's with improved pain control throughout shift  Outcome: Progressing  Goal: Participates in PT with improved pain control throughout the shift  Outcome: Progressing  Goal: Free from opioid side effects throughout the shift  Outcome: Progressing  Goal: Free from acute confusion related to pain meds throughout the shift  Outcome: Progressing     Problem: ACS/CP/NSTEMI/STEMI  Goal: Chest pain managed (free from pain or at acceptable level)  Outcome: Progressing  Goal: Lab values return to normal range  Outcome: Progressing  Goal: Promote self management  Outcome: Progressing  Goal: Serial ECG will return to baseline  Outcome: Progressing  Goal: Verbalize understanding of procedures/devices  Outcome: Progressing  Goal: Wean vasopressors/achieve hemodynamic stability  Outcome: Progressing     Problem: Arrythmia/Dysrhythmia  Goal: Lab values return to normal range  Outcome: Progressing  Goal: No evidence of post procedure complications  Outcome: Progressing  Goal: Promote self management  Outcome: Progressing  Goal: Serial ECG will return to baseline  Outcome: Progressing  Goal: Verbalize understanding of procedures/devices  Outcome: Progressing  Goal: Vital signs return to baseline  Outcome: Progressing  Goal: Care and maintenance of device (specify)  Outcome: Progressing     Problem: Cardiac catheterization  Goal: Free from dysrhythmias  Outcome: Progressing  Goal: Free from pain  Outcome: Progressing  Goal: No evidence of post procedure complications  Outcome: Progressing  Goal: Promote self management  Outcome: Progressing  Goal: Verbalize understanding of procedure  Outcome: Progressing  Goal: Care and maintenance of  device (specify)  Outcome: Progressing     Problem: Hypertensive Emergency/Crisis  Goal: Blood pressure gradually reduced to goal range  Outcome: Progressing  Goal: Free from signs of organ damage  Outcome: Progressing  Goal: Lab values return to normal range  Outcome: Progressing  Goal: Promote self management  Outcome: Progressing   The patient's goals for the shift include      The clinical goals for the shift include pt will remain HD stable and no bleeding

## 2025-02-24 ENCOUNTER — APPOINTMENT (OUTPATIENT)
Dept: CARDIOLOGY | Facility: HOSPITAL | Age: 80
DRG: 322 | End: 2025-02-24
Payer: MEDICARE

## 2025-02-24 LAB
ACT BLD: 297 SEC (ref 83–199)
ACT BLD: 300 SEC (ref 83–199)
ACT BLD: 327 SEC (ref 83–199)
ATRIAL RATE: 50 BPM
ATRIAL RATE: 55 BPM
CARDIAC TROPONIN I PNL SERPL HS: 1266 NG/L (ref 0–20)
ERYTHROCYTE [DISTWIDTH] IN BLOOD BY AUTOMATED COUNT: 12.6 % (ref 11.5–14.5)
HCT VFR BLD AUTO: 41.4 % (ref 41–52)
HGB BLD-MCNC: 14.1 G/DL (ref 13.5–17.5)
MCH RBC QN AUTO: 30.5 PG (ref 26–34)
MCHC RBC AUTO-ENTMCNC: 34.1 G/DL (ref 32–36)
MCV RBC AUTO: 89 FL (ref 80–100)
NRBC BLD-RTO: 0 /100 WBCS (ref 0–0)
P AXIS: 48 DEGREES
P AXIS: 56 DEGREES
P OFFSET: 178 MS
P OFFSET: 181 MS
P ONSET: 120 MS
P ONSET: 122 MS
PLATELET # BLD AUTO: 143 X10*3/UL (ref 150–450)
PR INTERVAL: 200 MS
PR INTERVAL: 206 MS
Q ONSET: 222 MS
Q ONSET: 223 MS
QRS COUNT: 8 BEATS
QRS COUNT: 9 BEATS
QRS DURATION: 88 MS
QRS DURATION: 90 MS
QT INTERVAL: 406 MS
QT INTERVAL: 420 MS
QTC CALCULATION(BAZETT): 370 MS
QTC CALCULATION(BAZETT): 401 MS
QTC FREDERICIA: 382 MS
QTC FREDERICIA: 408 MS
R AXIS: 19 DEGREES
R AXIS: 8 DEGREES
RBC # BLD AUTO: 4.63 X10*6/UL (ref 4.5–5.9)
T AXIS: -46 DEGREES
T AXIS: -62 DEGREES
T OFFSET: 425 MS
T OFFSET: 433 MS
VENTRICULAR RATE: 50 BPM
VENTRICULAR RATE: 55 BPM
WBC # BLD AUTO: 7.3 X10*3/UL (ref 4.4–11.3)

## 2025-02-24 PROCEDURE — C1894 INTRO/SHEATH, NON-LASER: HCPCS | Performed by: INTERNAL MEDICINE

## 2025-02-24 PROCEDURE — 85027 COMPLETE CBC AUTOMATED: CPT | Performed by: INTERNAL MEDICINE

## 2025-02-24 PROCEDURE — 85347 COAGULATION TIME ACTIVATED: CPT

## 2025-02-24 PROCEDURE — 2500000002 HC RX 250 W HCPCS SELF ADMINISTERED DRUGS (ALT 637 FOR MEDICARE OP, ALT 636 FOR OP/ED): Performed by: INTERNAL MEDICINE

## 2025-02-24 PROCEDURE — 2780000003 HC OR 278 NO HCPCS: Performed by: INTERNAL MEDICINE

## 2025-02-24 PROCEDURE — 2500000004 HC RX 250 GENERAL PHARMACY W/ HCPCS (ALT 636 FOR OP/ED): Performed by: INTERNAL MEDICINE

## 2025-02-24 PROCEDURE — C1887 CATHETER, GUIDING: HCPCS | Performed by: INTERNAL MEDICINE

## 2025-02-24 PROCEDURE — 84484 ASSAY OF TROPONIN QUANT: CPT | Performed by: INTERNAL MEDICINE

## 2025-02-24 PROCEDURE — 2060000001 HC INTERMEDIATE ICU ROOM DAILY

## 2025-02-24 PROCEDURE — 027034Z DILATION OF CORONARY ARTERY, ONE ARTERY WITH DRUG-ELUTING INTRALUMINAL DEVICE, PERCUTANEOUS APPROACH: ICD-10-PCS | Performed by: INTERNAL MEDICINE

## 2025-02-24 PROCEDURE — 99153 MOD SED SAME PHYS/QHP EA: CPT | Performed by: INTERNAL MEDICINE

## 2025-02-24 PROCEDURE — 2550000001 HC RX 255 CONTRASTS: Performed by: INTERNAL MEDICINE

## 2025-02-24 PROCEDURE — B2111ZZ FLUOROSCOPY OF MULTIPLE CORONARY ARTERIES USING LOW OSMOLAR CONTRAST: ICD-10-PCS | Performed by: INTERNAL MEDICINE

## 2025-02-24 PROCEDURE — 2720000007 HC OR 272 NO HCPCS: Performed by: INTERNAL MEDICINE

## 2025-02-24 PROCEDURE — C1753 CATH, INTRAVAS ULTRASOUND: HCPCS | Performed by: INTERNAL MEDICINE

## 2025-02-24 PROCEDURE — 2500000002 HC RX 250 W HCPCS SELF ADMINISTERED DRUGS (ALT 637 FOR MEDICARE OP, ALT 636 FOR OP/ED): Performed by: NURSE PRACTITIONER

## 2025-02-24 PROCEDURE — 92978 ENDOLUMINL IVUS OCT C 1ST: CPT | Mod: RC | Performed by: INTERNAL MEDICINE

## 2025-02-24 PROCEDURE — 93458 L HRT ARTERY/VENTRICLE ANGIO: CPT | Performed by: INTERNAL MEDICINE

## 2025-02-24 PROCEDURE — 93005 ELECTROCARDIOGRAM TRACING: CPT

## 2025-02-24 PROCEDURE — 2500000001 HC RX 250 WO HCPCS SELF ADMINISTERED DRUGS (ALT 637 FOR MEDICARE OP): Performed by: INTERNAL MEDICINE

## 2025-02-24 PROCEDURE — 4A023N7 MEASUREMENT OF CARDIAC SAMPLING AND PRESSURE, LEFT HEART, PERCUTANEOUS APPROACH: ICD-10-PCS | Performed by: INTERNAL MEDICINE

## 2025-02-24 PROCEDURE — 99152 MOD SED SAME PHYS/QHP 5/>YRS: CPT | Performed by: INTERNAL MEDICINE

## 2025-02-24 PROCEDURE — C1769 GUIDE WIRE: HCPCS | Performed by: INTERNAL MEDICINE

## 2025-02-24 PROCEDURE — 99233 SBSQ HOSP IP/OBS HIGH 50: CPT | Performed by: INTERNAL MEDICINE

## 2025-02-24 PROCEDURE — C1874 STENT, COATED/COV W/DEL SYS: HCPCS | Performed by: INTERNAL MEDICINE

## 2025-02-24 PROCEDURE — C9606 PERC D-E COR REVASC W AMI S: HCPCS | Performed by: INTERNAL MEDICINE

## 2025-02-24 PROCEDURE — 36415 COLL VENOUS BLD VENIPUNCTURE: CPT | Performed by: INTERNAL MEDICINE

## 2025-02-24 PROCEDURE — RXMED WILLOW AMBULATORY MEDICATION CHARGE

## 2025-02-24 PROCEDURE — 2500000005 HC RX 250 GENERAL PHARMACY W/O HCPCS: Performed by: INTERNAL MEDICINE

## 2025-02-24 PROCEDURE — C1760 CLOSURE DEV, VASC: HCPCS | Performed by: INTERNAL MEDICINE

## 2025-02-24 PROCEDURE — C1725 CATH, TRANSLUMIN NON-LASER: HCPCS | Performed by: INTERNAL MEDICINE

## 2025-02-24 PROCEDURE — 2500000004 HC RX 250 GENERAL PHARMACY W/ HCPCS (ALT 636 FOR OP/ED): Performed by: NURSE PRACTITIONER

## 2025-02-24 PROCEDURE — 99232 SBSQ HOSP IP/OBS MODERATE 35: CPT | Performed by: INTERNAL MEDICINE

## 2025-02-24 DEVICE — EVEROLIMUS-ELUTING PLATINUM CHROMIUM CORONARY STENT SYSTEM
Type: IMPLANTABLE DEVICE | Site: HEART | Status: FUNCTIONAL
Brand: SYNERGY™ XD

## 2025-02-24 RX ORDER — NITROGLYCERIN 40 MG/100ML
INJECTION INTRAVENOUS AS NEEDED
Status: DISCONTINUED | OUTPATIENT
Start: 2025-02-24 | End: 2025-02-24 | Stop reason: HOSPADM

## 2025-02-24 RX ORDER — MIDAZOLAM HYDROCHLORIDE 1 MG/ML
INJECTION, SOLUTION INTRAMUSCULAR; INTRAVENOUS AS NEEDED
Status: DISCONTINUED | OUTPATIENT
Start: 2025-02-24 | End: 2025-02-24 | Stop reason: HOSPADM

## 2025-02-24 RX ORDER — HEPARIN SODIUM 1000 [USP'U]/ML
INJECTION, SOLUTION INTRAVENOUS; SUBCUTANEOUS AS NEEDED
Status: DISCONTINUED | OUTPATIENT
Start: 2025-02-24 | End: 2025-02-24 | Stop reason: HOSPADM

## 2025-02-24 RX ORDER — SODIUM CHLORIDE 9 MG/ML
1.5 INJECTION, SOLUTION INTRAVENOUS CONTINUOUS
Status: ACTIVE | OUTPATIENT
Start: 2025-02-24 | End: 2025-02-24

## 2025-02-24 RX ORDER — EPTIFIBATIDE 2 MG/ML
INJECTION, SOLUTION INTRAVENOUS CONTINUOUS PRN
Status: COMPLETED | OUTPATIENT
Start: 2025-02-24 | End: 2025-02-24

## 2025-02-24 RX ORDER — FENTANYL CITRATE 50 UG/ML
INJECTION, SOLUTION INTRAMUSCULAR; INTRAVENOUS AS NEEDED
Status: DISCONTINUED | OUTPATIENT
Start: 2025-02-24 | End: 2025-02-24 | Stop reason: HOSPADM

## 2025-02-24 RX ORDER — VERAPAMIL HYDROCHLORIDE 2.5 MG/ML
INJECTION, SOLUTION INTRAVENOUS AS NEEDED
Status: DISCONTINUED | OUTPATIENT
Start: 2025-02-24 | End: 2025-02-24 | Stop reason: HOSPADM

## 2025-02-24 RX ORDER — ROSUVASTATIN CALCIUM 40 MG/1
40 TABLET, COATED ORAL NIGHTLY
Qty: 90 TABLET | Refills: 0 | Status: SHIPPED | OUTPATIENT
Start: 2025-02-24

## 2025-02-24 RX ORDER — ROSUVASTATIN CALCIUM 20 MG/1
40 TABLET, COATED ORAL NIGHTLY
Status: DISCONTINUED | OUTPATIENT
Start: 2025-02-24 | End: 2025-02-25 | Stop reason: HOSPADM

## 2025-02-24 RX ORDER — ACETAMINOPHEN 160 MG/5ML
650 SOLUTION ORAL EVERY 6 HOURS PRN
Status: CANCELLED | OUTPATIENT
Start: 2025-02-24

## 2025-02-24 RX ORDER — ACETAMINOPHEN 325 MG/1
650 TABLET ORAL EVERY 6 HOURS PRN
Status: CANCELLED | OUTPATIENT
Start: 2025-02-24

## 2025-02-24 RX ORDER — ACETAMINOPHEN 650 MG/1
650 SUPPOSITORY RECTAL EVERY 6 HOURS PRN
Status: CANCELLED | OUTPATIENT
Start: 2025-02-24

## 2025-02-24 RX ORDER — ENOXAPARIN SODIUM 100 MG/ML
40 INJECTION SUBCUTANEOUS EVERY 24 HOURS
Status: DISCONTINUED | OUTPATIENT
Start: 2025-02-24 | End: 2025-02-25 | Stop reason: HOSPADM

## 2025-02-24 RX ORDER — LIDOCAINE HYDROCHLORIDE 10 MG/ML
INJECTION, SOLUTION EPIDURAL; INFILTRATION; INTRACAUDAL; PERINEURAL AS NEEDED
Status: DISCONTINUED | OUTPATIENT
Start: 2025-02-24 | End: 2025-02-24 | Stop reason: HOSPADM

## 2025-02-24 RX ADMIN — ROSUVASTATIN 40 MG: 20 TABLET, FILM COATED ORAL at 21:26

## 2025-02-24 RX ADMIN — ASPIRIN 81 MG CHEWABLE TABLET 81 MG: 81 TABLET CHEWABLE at 08:47

## 2025-02-24 RX ADMIN — TAMSULOSIN HYDROCHLORIDE 0.4 MG: 0.4 CAPSULE ORAL at 21:26

## 2025-02-24 RX ADMIN — ENOXAPARIN SODIUM 40 MG: 40 INJECTION SUBCUTANEOUS at 21:30

## 2025-02-24 RX ADMIN — TICAGRELOR 90 MG: 90 TABLET ORAL at 21:27

## 2025-02-24 SDOH — HEALTH STABILITY: PHYSICAL HEALTH: ON AVERAGE, HOW MANY DAYS PER WEEK DO YOU ENGAGE IN MODERATE TO STRENUOUS EXERCISE (LIKE A BRISK WALK)?: 7 DAYS

## 2025-02-24 SDOH — ECONOMIC STABILITY: FOOD INSECURITY: HOW HARD IS IT FOR YOU TO PAY FOR THE VERY BASICS LIKE FOOD, HOUSING, MEDICAL CARE, AND HEATING?: NOT HARD AT ALL

## 2025-02-24 SDOH — HEALTH STABILITY: PHYSICAL HEALTH: ON AVERAGE, HOW MANY MINUTES DO YOU ENGAGE IN EXERCISE AT THIS LEVEL?: 60 MIN

## 2025-02-24 SDOH — ECONOMIC STABILITY: HOUSING INSECURITY: IN THE LAST 12 MONTHS, WAS THERE A TIME WHEN YOU WERE NOT ABLE TO PAY THE MORTGAGE OR RENT ON TIME?: NO

## 2025-02-24 SDOH — ECONOMIC STABILITY: HOUSING INSECURITY: AT ANY TIME IN THE PAST 12 MONTHS, WERE YOU HOMELESS OR LIVING IN A SHELTER (INCLUDING NOW)?: NO

## 2025-02-24 SDOH — ECONOMIC STABILITY: TRANSPORTATION INSECURITY: IN THE PAST 12 MONTHS, HAS LACK OF TRANSPORTATION KEPT YOU FROM MEDICAL APPOINTMENTS OR FROM GETTING MEDICATIONS?: NO

## 2025-02-24 SDOH — ECONOMIC STABILITY: HOUSING INSECURITY: IN THE PAST 12 MONTHS, HOW MANY TIMES HAVE YOU MOVED WHERE YOU WERE LIVING?: 0

## 2025-02-24 ASSESSMENT — PAIN SCALES - GENERAL
PAINLEVEL_OUTOF10: 0 - NO PAIN
PAINLEVEL_OUTOF10: 0 - NO PAIN

## 2025-02-24 ASSESSMENT — ACTIVITIES OF DAILY LIVING (ADL)
LACK_OF_TRANSPORTATION: NO
LACK_OF_TRANSPORTATION: NO

## 2025-02-24 ASSESSMENT — ENCOUNTER SYMPTOMS
NEUROLOGICAL NEGATIVE: 1
ALLERGIC/IMMUNOLOGIC NEGATIVE: 1
EYES NEGATIVE: 1
GASTROINTESTINAL NEGATIVE: 1
CONSTITUTIONAL NEGATIVE: 1
HEMATOLOGIC/LYMPHATIC NEGATIVE: 1
ENDOCRINE NEGATIVE: 1
MUSCULOSKELETAL NEGATIVE: 1
PSYCHIATRIC NEGATIVE: 1
CARDIOVASCULAR NEGATIVE: 1
RESPIRATORY NEGATIVE: 1

## 2025-02-24 ASSESSMENT — COGNITIVE AND FUNCTIONAL STATUS - GENERAL
DAILY ACTIVITIY SCORE: 24
MOBILITY SCORE: 24

## 2025-02-24 ASSESSMENT — PAIN - FUNCTIONAL ASSESSMENT: PAIN_FUNCTIONAL_ASSESSMENT: 0-10

## 2025-02-24 ASSESSMENT — PAIN SCALES - WONG BAKER: WONGBAKER_NUMERICALRESPONSE: NO HURT

## 2025-02-24 NOTE — POST-PROCEDURE NOTE
Physician Transition of Care Summary  Invasive Cardiovascular Lab    Procedure Date: 2/24/2025  Attending:    * Chai Solomon - Primary  Resident/Fellow/Other Assistant: Surgeons and Role:  * No surgeons found with a matching role *    Indications:   Pre-op Diagnosis      * NSTEMI (non-ST elevated myocardial infarction) (Multi) [I21.4]    Post-procedure diagnosis:   Post-op Diagnosis     * NSTEMI (non-ST elevated myocardial infarction) (Multi) [I21.4]    Procedure(s):   Left Heart Cath with Coronary Angiography and LV  92171 - [place] CATH PLMT L HRT & ARTS W/NJX & ANGIO IMG S&I        Procedure Findings:   Severe 2 vessel CAD in a right dominant system.  Moderate LAD disease.  Elevated LVEDP.  No evidence of aortic stenosis.  Successful OCT guided PCI of the mid RCA with a 3.5 x 32mm Synergy TORIE postdilated to 3.75mm.      Description of the Procedure:   R radial    Complications:   none    Stents/Implants:   Implants       Stent    Stent, Synergy Xd, Mr Woods, 3.50 X 32mm - [id number] - Implanted        Inventory item: STENT, SYNERGY XD, MR WOODS, 3.50 X 32MM Model/Cat number: [id number]    : [employer] Lot number: [id number]    Device identifier: [id number]        GUDID Information       Request status Successful        Brand name: SYNERGY™ XD Version/Model: [id number]    Company name: [employer] MRI safety info as of 2/24/25: MR Elin    Contains dry or latex rubber: No      GMDN P.T. name: Drug-eluting coronary artery stent, non-bioabsorbable-polymer-coated                As of 2/24/2025       Status: Implanted                              Anticoagulation/Antiplatelet Plan:   Asa/Brilinta    Estimated Blood Loss:   20 mL    Anesthesia: Moderate Sedation Anesthesia Staff: No anesthesia staff entered.    Any Specimen(s) Removed:   No specimens collected during this procedure.    Disposition:   SDU      Electronically signed by: Chai Solomon MD, 2/24/2025 1:07  PM

## 2025-02-24 NOTE — PROGRESS NOTES
02/24/25 1523   [facility] Disability Status   Are you deaf or do you have serious difficulty hearing? N   Are you blind or do you have serious difficulty seeing, even when wearing glasses? N   Because of a physical, mental, or emotional condition, do you have serious difficulty concentrating, remembering, or making decisions? (5 years old or older) N   Do you have serious difficulty walking or climbing stairs? N   Do you have serious difficulty dressing or bathing? N   Because of a physical, mental, or emotional condition, do you have serious difficulty doing errands alone such as visiting the doctor? N

## 2025-02-24 NOTE — PROGRESS NOTES
02/24/25 1523   Discharge Planning   Living Arrangements Spouse/significant other   Support Systems Spouse/significant other   Assistance Needed Independent, drives   Type of Residence Private residence   Number of Stairs to Enter Residence 2   Number of Stairs Within Residence 12   Do you have animals or pets at home? No   Who is requesting discharge planning? Provider   Home or Post Acute Services None   Expected Discharge Disposition Home   Does the patient need discharge transport arranged? Yes   RoundTrip coordination needed? Yes   Has discharge transport been arranged? No   Financial Resource Strain   How hard is it for you to pay for the very basics like food, housing, medical care, and heating? Not hard   Housing Stability   In the last 12 months, was there a time when you were not able to pay the mortgage or rent on time? N   In the past 12 months, how many times have you moved where you were living? 0   At any time in the past 12 months, were you homeless or living in a shelter (including now)? N   Transportation Needs   In the past 12 months, has lack of transportation kept you from medical appointments or from getting medications? no   In the past 12 months, has lack of transportation kept you from meetings, work, or from getting things needed for daily living? No   Patient Choice   Provider Choice list and CMS website (https://medicare.gov/care-compare#search) for post-acute Quality and Resource Measure Data were provided and reviewed with: Patient   Patient / Family choosing to utilize agency / facility established prior to hospitalization No   Stroke Family Assessment   Stroke Family Assessment Needed No   Intensity of Service   Intensity of Service 0-30 min         Met with patient and his wife at bedside this morning and explained my role as care coordinator. They live in the house. Patient is independent with his care at home. He drives. Patient denies use of any ambulatory devices. No oxygen in  use at home, no HD. His PCP is Dr. Joel Castaneda and he seen him one week ago. Pharmacy he uses is [facility] in [place]. He is able to afford medications and to get to his doctors appointments. Patient came in with chest pain and is on Heparin drip and ntg drip. He is going for cardiac catheterization. Patient denies any needs going home.

## 2025-02-24 NOTE — PROGRESS NOTES
"Subjective Data:  Recurrent chest pain yesterday afternoon.  Started on nitroglycerin infusion at low-dose and no further chest pain  Seen in precath lab.  This morning and feels good.       Objective Data:  Last Recorded Vitals:  Vitals:    25 0010 25 0419 25 0750 25 0849   BP: 124/66 106/60 118/70 134/74   BP Location: Right arm Right arm Right arm    Patient Position: Lying Lying Lying    Pulse: 52 53 50 52   Resp: 16 16 16 18   Temp: 36.4 °C (97.5 °F) 36.5 °C (97.7 °F) 36.3 °C (97.3 °F) 36.5 °C (97.7 °F)   TempSrc: Temporal Temporal Skin Skin   SpO2: 98% 97% 97% 100%   Weight:    71.7 kg (158 lb 1.1 oz)   Height:    1.727 m (5' 7.99\")     Medical Gas Therapy: None (Room air)  Weight  Av kg (158 lb 11 oz)  Min: 71.7 kg (158 lb)  Max: 72.6 kg (160 lb)    LABS:  CMP:  Results from last 7 days   Lab Units 25  0629 25  1047   SODIUM mmol/L 139 138   POTASSIUM mmol/L 4.3 4.5   CHLORIDE mmol/L 105 106   CO2 mmol/L 27 26   ANION GAP mmol/L 11 11   BUN mg/dL 17 19   CREATININE mg/dL 0.99 0.97   EGFR mL/min/1.73m*2 78 80   ALBUMIN g/dL 4.2 4.5   ALT U/L 19 17   AST U/L 23 26   BILIRUBIN TOTAL mg/dL 1.3* 1.3*   LIPASE U/L  --  11     CBC:  Results from last 7 days   Lab Units 25  0629 25  1047   WBC AUTO x10*3/uL 7.3 7.6   HEMOGLOBIN g/dL 14.1 16.2   HEMATOCRIT % 41.4 46.6   PLATELETS AUTO x10*3/uL 143* 129*   MCV fL 89 87     COAG:     ABO: No results found for: \"ABO\"  HEME/ENDO:  Results from last 7 days   Lab Units 25  1148   TSH mIU/L 3.35      CARDIAC:   Results from last 7 days   Lab Units 25  0629 25  1556 25  0629 25  1823 25  1148 25  1047   TROPHS ng/L 1,266* 676* 821* 1,013* 637* 653*      Results from last 7 days   Lab Units 25  1148   LDL CALC mg/dL 100*   VLDL mg/dL 48*   CHOLESTEROL/HDL RATIO  4.8        Last I/O:    Intake/Output Summary (Last 24 hours) at 2025 1013  Last data filed at 2025 " 2331  Gross per 24 hour   Intake 355.4 ml   Output --   Net 355.4 ml     Net IO Since Admission: 561.8 mL [02/24/25 1013]            Past Cardiology Tests    Echo:  2/23/2025: LVEF 55-60%, stage I DD, normal RVSP, mild AI    Cath:  [facility] 1/20/2020: Minimal nonobstructive CAD  Stress Test:  Lexiscan stress cardiac MRI 5/2024:  1. Normal LV size and systolic function. Quantitative LVEF 69 %.   2. No evidence of inducible myocardial ischemia using regadenoson   stress perfusion imaging.   3. No evidence of myocardial fibrosis, infiltration or infarction   based on LGE imaging.   4. Normal RV size and systolic function. Quantitative RVEF53%.       Inpatient Medications:  Scheduled medications   Medication Dose Route Frequency    aspirin  81 mg oral Daily    levothyroxine  50 mcg oral Daily    pantoprazole  40 mg oral Daily before breakfast    polyethylene glycol  17 g oral Daily    rosuvastatin  20 mg oral Nightly    tamsulosin  0.4 mg oral BID     PRN medications   Medication    acetaminophen    Or    acetaminophen    Or    acetaminophen    heparin    nitroglycerin    ondansetron    Or    ondansetron     Continuous Medications   Medication Dose Last Rate    heparin  0-4,000 Units/hr 900 Units/hr (02/23/25 0836)    nitroglycerin in 5 % dextrose  10 mcg/min 10 mcg/min (02/23/25 2331)       Physical Exam:  GENERAL: alert, cooperative, pleasant, in no acute distress  SKIN: warm, dry, no rash.  NECK: no JVD, no JUSTINA  EXTREMITIES: no edema  NEURO: Alert and oriented x 3.  Grossly normal.  Moves all 4 extremities.       Assessment/Plan   79 y.o. male with history of bradycardia, dyslipidemia, trivial CAD on catheterization 2020, gout     #NSTEMI  Type I versus type II MI.  May be vasospasm?  New EKG changes with T wave inversions inferolaterally  Type I MI until proven otherwise  No further chest pain since nitr gtt  Continue aspirin  Increase rosuvastatin to 10 mg  Continue heparin infusion  No beta-blocker due to longstanding  chronic bradycardia  Echocardiogram with normal LVEF and no regional wall motion abnormalities    [facility] today    #Dyslipidemia   on rosuvastatin 5. Increased to 10. Will see what cath shows. If heavy atherosclerotic dx then increase to 20 mg         Codey Deluna, DO

## 2025-02-24 NOTE — H&P
History Of Present Illness  Francisco Lizama is a 79 y.o. male presenting with chest pain, NSTEMI, here for [facility]. Patient reports severe chest pain started Wednesday (2/19/25) and was told that it was related to GERD. He came to ED on 2/22/25 as pain was not improving. Patient states he would get severe, burning chest pain that radiates down left arm that would last approximately 10 minutes. Patient reports he is currently chest pain free with nitroglycerin gtt.  PMH includes bradycardia, dyslipidemia, nonobstructive CAD on catheterization in 2020, gout.    Past Medical History:  Past Medical History:   Diagnosis Date    Acquired hypothyroidism     BPH (benign prostatic hyperplasia)     Gout     Kidney stone     Mixed hyperlipidemia     Paroxysmal atrial fibrillation (Multi)     Raynaud's syndrome without gangrene     Scrotal mass         Past Surgical History:  Past Surgical History:   Procedure Laterality Date    TONSILLECTOMY      childhood          Social History:  Social History     Tobacco Use    Smoking status: Never    Smokeless tobacco: Never   Vaping Use    Vaping status: Never Used   Substance Use Topics    Alcohol use: Yes     Alcohol/week: 1.0 standard drink of alcohol     Types: 1 Standard drinks or equivalent per week    Drug use: Never       Family History:  Family History   Problem Relation Name Age of Onset    Other (lung fibrosis) Mother      Other (CABG) Father      No Known Problems Brother      No Known Problems Daughter          Allergies:  No Known Allergies     Home Medications:  Current Outpatient Medications   Medication Instructions    aspirin 81 mg, oral, Daily    colchicine 0.6 mg, oral, Daily    famotidine (PEPCID) 20 mg, oral, Nightly    levothyroxine (SYNTHROID, LEVOXYL) 50 mcg, oral, Every Day    pantoprazole (PROTONIX) 40 mg, oral, Daily before breakfast, Do not crush, chew, or split.    pantoprazole (PROTONIX) 20 mg, oral, 2 times daily, Take before breakfast and before dinner Do not  crush, chew, or split.    pravastatin (Pravachol) 10 mg tablet 1 p.o. every other day    rosuvastatin (CRESTOR) 5 mg, oral, Nightly    tamsulosin (FLOMAX) 0.4 mg, oral, Daily       Inpatient Medications:  Scheduled medications   Medication Dose Route Frequency    aspirin  81 mg oral Daily    levothyroxine  50 mcg oral Daily    pantoprazole  40 mg oral Daily before breakfast    polyethylene glycol  17 g oral Daily    rosuvastatin  20 mg oral Nightly    tamsulosin  0.4 mg oral BID     PRN medications   Medication    acetaminophen    Or    acetaminophen    Or    acetaminophen    heparin    nitroglycerin    ondansetron    Or    ondansetron     Continuous Medications   Medication Dose Last Rate    heparin  0-4,000 Units/hr 900 Units/hr (02/23/25 0528)    nitroglycerin in 5 % dextrose  10 mcg/min 10 mcg/min (02/23/25 2556)         Review of Systems   Constitutional: Negative.    HENT: Negative.     Eyes: Negative.    Respiratory: Negative.     Cardiovascular: Negative.    Gastrointestinal: Negative.    Endocrine: Negative.    Genitourinary: Negative.    Musculoskeletal: Negative.    Skin: Negative.    Allergic/Immunologic: Negative.    Neurological: Negative.    Hematological: Negative.    Psychiatric/Behavioral: Negative.            Physical Exam  Constitutional:       General: He is awake. He is not in acute distress.     Appearance: He is not ill-appearing.   Cardiovascular:      Rate and Rhythm: Regular rhythm. Bradycardia present.      Pulses: Normal pulses.           Radial pulses are 2+ on the right side and 2+ on the left side.        Dorsalis pedis pulses are 2+ on the right side and 2+ on the left side.      Heart sounds: Normal heart sounds. No murmur heard.  Pulmonary:      Effort: Pulmonary effort is normal.      Breath sounds: Normal breath sounds and air entry.   Abdominal:      General: Bowel sounds are normal.      Palpations: Abdomen is soft.      Tenderness: There is no abdominal tenderness.  "  Musculoskeletal:      Right lower leg: No edema.      Left lower leg: No edema.   Skin:     General: Skin is warm and dry.   Neurological:      General: No focal deficit present.      Mental Status: He is alert and oriented to person, place, and time.      GCS: GCS eye subscore is 4. GCS verbal subscore is 5. GCS motor subscore is 6.   Psychiatric:         Mood and Affect: Mood normal.         Behavior: Behavior is cooperative.        Sedation Plan    ASA 3     Mallampati class: III.           NPO since last night around 1700    Last Recorded Vitals  Blood pressure 134/74, pulse 52, temperature 36.5 °C (97.7 °F), temperature source Skin, resp. rate 18, height 1.727 m (5' 7.99\"), weight 71.7 kg (158 lb 1.1 oz), SpO2 100%.         Vitals from the Past 24 Hours  Heart Rate:  [50-53]   Temp:  [36.3 °C (97.3 °F)-36.5 °C (97.7 °F)]   Resp:  [16-18]   BP: (106-137)/(60-78)   Height:  [172.7 cm (5' 7.99\")]   Weight:  [71.7 kg (158 lb 1.1 oz)]   SpO2:  [97 %-100 %]          Relevant Results    Labs      CBC:   Recent Labs     02/24/25  0629 02/22/25  1047 05/23/24 2222 03/20/24  0433 03/19/24  1050 11/30/23  0858   WBC 7.3 7.6 6.5 5.3 6.6 6.0   HGB 14.1 16.2 15.3 14.1 16.2 15.6   HCT 41.4 46.6 44.8 42.2 47.6 46.1   * 129* 178 169 174 165   MCV 89 87 89 88 89 90     BMP/CMP:   Recent Labs     02/23/25  0629 02/22/25  1047 10/18/24  1258 05/23/24  2222 03/20/24  0433 03/19/24  1050 11/30/23  0858 11/30/23  0858 11/25/22  1543    138 140 138 139 139   < > 142 139   K 4.3 4.5 4.4 4.2 4.4 4.2   < > 4.6 4.4    106 107 103 105 103   < > 105 101   BUN 17 19 16 22 15 17   < > 12 15   CREATININE 0.99 0.97 0.96 1.01 1.02 1.00   < > 0.94 1.20   CO2 27 26 25 24 27 28   < > 28 30   CALCIUM 9.1 9.6 9.9 9.5 9.0 9.7   < > 9.7 9.6   PROT 6.5 7.0  --  6.9  --  7.1  --  6.3* 6.8   BILITOT 1.3* 1.3*  --  1.2  --  1.4*  --  1.5* 1.4*   ALKPHOS 60 65  --  72  --  70  --  58 72   ALT 19 17  --  23  --  15  --  18 17   AST 23 " "26  --  24  --  18  --  17 19   GLUCOSE 109* 90 85 100* 98 85   < > 96 94    < > = values in this interval not displayed.      Magnesium:   Recent Labs     03/20/24  0433 03/19/24  1215   MG 2.00 2.20     Lipid Panel:   Recent Labs     02/22/25  1148 03/20/24  0433 11/30/23  0858 11/25/22  1543 10/05/21  0840 12/13/19  1146 08/24/18  0828   CHOL 187 142 152 174 160 146 159   HDL 39.1 37.4 44.0 38.4* 41.3 40.7 39.2*   CHHDL 4.8 3.8 3.5 4.5 3.9 3.6 4.1   VLDL 48* 29 29 64* 32 28 29   TRIG 239* 147 145 318* 160* 142 147   NHDL 148 105 108 136  --   --   --      Cardiac   Results from last 7 days   Lab Units 02/24/25  0629 02/23/25  1556 02/23/25  0629 02/22/25  1823 02/22/25  1148 02/22/25  1047   TROPHS ng/L 1,266* 676* 821* 1,013* 637* 653*      Hemoglobin A1C:   Recent Labs     03/19/24  1050   HGBA1C 5.9*     TSH/ Free T4:   Recent Labs     02/22/25  1148 03/19/24  1215 11/30/23  0858 11/25/22  1543 10/05/21  0840 01/19/20  1740 12/13/19  1146 08/24/18  0828   TSH 3.35 3.14 2.62 3.05 3.07 3.89 1.85 1.83   FREET4  --   --   --   --   --   --  1.05 0.89     Iron:   Recent Labs     05/23/24  2222 03/19/24  1050 01/20/20  0508 01/19/20  1740   BNP 25 35 19 19     Coag:     ABO: No results found for: \"ABO\"    Past Cardiology Tests (Last 3 Years):    EKG:  Recent Labs     02/23/25  1615 02/23/25  0910 02/22/25  1038   ATRRATE 50 55 57   VENTRATE 50 55 57   PRINT 200 206 206   QRSDUR 90 88 86   QTCFRED 382 408 420   QTCCALCB 370 401 416     Encounter Date: 02/22/25   Electrocardiogram, 12-lead PRN ACS symptoms   Result Value    Ventricular Rate 50    Atrial Rate 50    IA Interval 200    QRS Duration 90    QT Interval 406    QTC Calculation(Bazett) 370    P Axis 48    R Axis 8    T Axis -46    QRS Count 8    Q Onset 222    P Onset 122    P Offset 178    T Offset 425    QTC Fredericia 382    Narrative    Sinus bradycardia  ST & T wave abnormality, consider inferior ischemia  Abnormal ECG  When compared with ECG of " 23-FEB-2025 09:08, (unconfirmed)  No significant change was found     Echo:  Echocardiogram:   Transthoracic Echo (TTE) Complete 02/22/2025    PHYSICIAN INTERPRETATION:  Left Ventricle: The left ventricular systolic function is normal, with a visually estimated ejection fraction of 55-60%. There are no regional left ventricular wall motion abnormalities. The left ventricular cavity size is normal. There is mildly increased septal and normal posterior left ventricular wall thickness. There is left ventricular concentric remodeling. Spectral Doppler shows a Grade I (impaired relaxation pattern) of left ventricular diastolic filling with normal left atrial filling pressure.  Left Atrium: The left atrial size is normal.  Right Ventricle: The right ventricle is normal in size. There is normal right ventricular global systolic function.  Right Atrium: The right atrium is normal in size.  Aortic Valve: The aortic valve is trileaflet. There is mild aortic valve thickening. There is mild aortic valve regurgitation. The peak instantaneous gradient of the aortic valve is 8 mmHg.  Mitral Valve: The mitral valve is mildly thickened. There is mild mitral annular calcification. The peak instantaneous gradient of the mitral valve is 3 mmHg. There is trace mitral valve regurgitation.  Tricuspid Valve: The tricuspid valve is structurally normal. There is trace tricuspid regurgitation. The Doppler estimated RVSP is within normal limits at 26.6 mmHg.  Pulmonic Valve: The pulmonic valve is structurally normal. There is trace pulmonic valve regurgitation.  Pericardium: There is no pericardial effusion noted.  Aorta: The aortic root is normal.  Systemic Veins: The inferior vena cava appears normal in size.  In comparison to the previous echocardiogram(s): There are no prior studies on this patient for comparison purposes.      CONCLUSIONS:  1. The left ventricular systolic function is normal, with a visually estimated ejection fraction of  55-60%.  2. Spectral Doppler shows a Grade I (impaired relaxation pattern) of left ventricular diastolic filling with normal left atrial filling pressure.  3. Right ventricular systolic pressure is within normal limits.  4. Mild aortic valve regurgitation.    Ejection Fractions:  LV EF   Date/Time Value Ref Range Status   02/22/2025 03:07 PM 58 %    03/20/2024 12:07 PM 70 %      Cath:  Coronary Angiography:     Cardiovascular Catheterization Report     Study Date:       1/20/2020       Study:            Left Heart Catheterization  Additional Study: Coronary Arteriogram        Indications:  IRIS FIGUEROA is a 75 year old male who presents with atrial fibrillation and dyslipidemia. Acute coronary syndrome <=24 hrs, with a chest pain assessment of atypical angina. Study performed as an urgent cath procedure.     Medical History:  Stress test performed: No. CTA performed: NoAlo Frye accessed: No. LVEF Assessed: No.     Procedure Description:  After infiltration with 2% Lidocaine, the right radial artery was cannulated with a modified Seldinger technique. Subsequently a 6 Swedish sheath was placed in the right radial artery. Selective coronary catheterization was performed using a 4 Fr and 5 Fr catheter(s) exchanged over a guide wire to cannulate the coronary arteries. A JL 3.5 tip catheter was used for left coronary injections. A JR 4 tip catheter was used for right coronary injections.  Multiple injections of contrast were made into the left and right coronary arteries with angiograms recorded in multiple projections. After completion of the procedure, the arterial sheath was pulled and a TR Band Radial Compression Device was utilized to obtain patent hemostasis.     Procedure Description Comments:  A JR4 was used to cross the aortic valve for LV pressure measurement and pullback.     End sedation time: 14:40.     Coronary Angiography:  The coronary circulation is right dominant.     Left Main Coronary Artery:  The  left main coronary artery is a normal caliber vessel. The left main arises normally from the left coronary sinus of Valsalva and bifurcates into the LAD and circumflex coronary arteries. The left main coronary artery showed no significant disease or stenosis greater than 30%.     Left Anterior Descending Coronary Artery Distribution:  The left anterior descending coronary artery is a normal caliber vessel. The LAD arises normally from the left main coronary artery. The LAD demonstrated moderate calcification. The mid left anterior descending coronary artery showed 30% stenosis. The 1st diagonal branch is a small caliber vessel. The 1st diagonal branch showed no significant disease or stenosis greater than 30%. The 2nd diagonal branch is a normal caliber vessel. The 2nd diagonal branch demonstrated no significant disease or stenosis greater than 30%.     Circumflex Coronary Artery Distribution:  The circumflex coronary artery is a normal caliber vessel. The circumflex arises normally from the left main coronary artery and terminates in the AV groove. The circumflex revealed no significant disease or stenosis greater than 30% and calcification. The 1st obtuse marginal branch is a very small caliber vessel. The 1st obtuse marginal branch showed no significant disease or stenosis greater than 30%. The 2nd obtuse marginal branch is a normal caliber vessel. The 2nd obtuse marginal branch demonstrated no significant disease or stenosis greater than 30%.     Right Coronary Artery Distribution:     The right coronary artery is a normal caliber vessel. The RCA arises normally from the right sinus of Valsalva. The RCA showed moderate calcification. The mid right coronary artery showed 10 to 30% stenosis.        Valve Findings:  No aortic valve stenosis is visualized.     Coronary Interventions:     Coronary Lesion Summary:  Vessel      Stenosis      Vessel Segment  LAD       30% stenosis         mid  RCA    10 to 30% stenosis   "    mid       Complications:  No in-lab complications observed.     Cardiac Cath Transition of Care Summary:  Post Procedure Diagnosis: Nonobstructive CAD.  Blood Loss:               Estimated blood loss during the procedure was 5 mls.  Specimens Removed:        Number of specimen(s) removed: none.        Recommendations:  Maximize medical therapy.     ____________________________________________________________________________________  CONCLUSIONS:   1. Nonobstructive CAD in a right dominant system.   2. Mildly elevated LVEDP.   3. No aortic stenosis.    Stress Test:  Nuclear:No results found for this or any previous visit from the past 1800 days.    Metabolic Stress: No results found for this or any previous visit from the past 1800 days.    Cardiac Imaging:  Cardiac Scoring: No results found for this or any previous visit from the past 1800 days.    Cardiac MRI:   MR cardiac w and wo IV contrast w regadenoson stress for MORPH FUNCT and valve DZ 05/16/2024    FINDINGS:  CARDIAC CHAMBERS  Normal atrioventricular and ventriculoarterial concordance    LEFT ATRIUM  Normal size (JOE-35.4 ml/m2).    RIGHT ATRIUM  Normal size (RA max 4ch-18.08 cm2)    INTERATRIAL SEPTUM  Intact.    LEFT VENTRICLE:  1. Normal LV size and normal systolic function. Quantitative LVEF 69%.  2. No regional wall motion abnormalities.  3. Normal LV wall thickness and normal LV indexed mass.  4. T2 weighted imaging was not performed.  5. Parametric mapping was not performed. ECV could not be calculated.  6. No evidence of LV thrombus.  7. Delayed-enhancement imaging reveals uniformly \"nulled\" myocardium,  signifying that there has been no prior ischemic myocardial damage.  There is also no definite evidence of interstitial fibrosis to  suggest an infiltrative process.    Quantitative left ventricular functional values are as follows:  EDV = 94 cc; EDVi = 51 cc/m2  ESV = 29 cc; ESVi = 16 cc/m2  Stroke volume = 65 cc; SVi = 35 cc/m2  LVEF = 69 " %  Absolute Cardiac Output = 3.19 l/min.; COi = 1.71 l/min/m2  LV mass = 126 gm; LVMi = 67 gm/m2    LVEDD: 4.4 cm  LVESD: 2.0 cm  LV septal wall thickness (anterobasal): 0.9 cm  LV postero-inferior wall thickness: 0.8 cm    STRESS PERFUSION:  Following administration of regadenoson, during first pass perfusion,  there is uniform contrast wash-in in all visualized myocardial  segments.    RIGHT VENTRICLE  The right ventricle appears normal in size(EDVi-58 ml/m2), shape, and  has  systolic function. Quantitative RVEF53 % no segmental wall  motion abnormalities. No abnormal delayed enhancement in the  myocardium.    INTERVENTRICULAR SEPTUM  Intact.    AORTIC VALVE  The aortic valve is trileaflet  There is  trivial aortic regurgitation.  Flow quantification through the ascending aorta:  Forward volume =55 cc/beat  Reverse volume = 3 cc/beat  Net forward volume = 52 cc/beat  Aortic regurgitant fraction = 6 %  Peak aortic velocity = 1.4 m/sec    MITRAL VALVE  There is  mild mitral regurgitation.  There is partial prolapse of the anterior mitral valve leaflet.  Integrating LV volumetric and aortic flow quantification data reveals:  Quantitative mitral regurgitant volume = 10 cc/beat  Quantitative mitral regurgitant fraction = 15.3 %    TRICUSPID VALVE  There is qualitative mild tricuspid regurgitation.    PULMONARY VALVE:  Not assessed.    PERICARDIUM:  The pericardium is normal. There is no pericardial effusion.    THORACIC AORTA  The thoracic aorta appears normal in course, caliber, and contour.  There is no evidence for acute aortic pathology. The arch vessel  branching pattern is  normal.   All the arch branch vessels appear  widely patent in their proximal portions.    AORTIC ROOT DIMENSIONS:  Aortic root(sinus of valsalva): 3.0 cm  Annulus: 1.6 cm  Sinotubular junction:2.4 cm    PULMONARY ARTERIES  The central pulmonary arteries appear  normal (MPA-1.8 cm, RPA- 1.6  cm, LPA-1.8 cm).    SYSTEMIC AND PULMONARY  VEINS  Normal systemic venous and pulmonary venous return.  The SVC is of normal caliber. IVC appears normal  Normal pulmonary venous anatomy.    CHEST  The chest wall is normal.  Limited imaging through the lungs reveals no gross abnormalities. No  pleural effusion.    UPPER ABDOMEN  Limited imaging through the upper abdomen reveals no abnormalities of  the visualized organs.    Impression  1. Normal LV size and systolic function. Quantitative LVEF 69 %.  2. No evidence of inducible myocardial ischemia using regadenoson  stress perfusion imaging.  3. No evidence of myocardial fibrosis, infiltration or infarction  based on LGE imaging.  4. Normal RV size and systolic function. Quantitative RVEF53%.       Assessment/Plan  Assessment/Plan   Assessment & Plan  NSTEMI (non-ST elevated myocardial infarction) (Multi)      NSTEMI, chest pain  -[facility] with Dr. Solomon on 2/24/25  -asa prior to procedure       NP discussed with Dr. Solomon regarding plan of care/ discharge plan      I spent 30 minutes in the professional and overall care of this patient.      Aldair Fernandez, APRN-CNP, DNP

## 2025-02-24 NOTE — PROGRESS NOTES
Francisco Lizama was identified as being a new start on a high cost medication.    Medication name(s): Brilinta    Discussed with patient. Per test claim on  Pharmacy software, the copay is $47 per month after $420 deductible (first month $467). The cost is affordable for patient.     Patient does have concerns about tolerating high-dose statins. Educated on Co-Q10 supplement. Discussed if muscle symptoms return again, he should follow up with cardiologist for alternative cholesterol-lowering options (he states his wife is on Repatha but does not love the idea of an injection for himself).    Medication counseling provided to patient on what the medication does, dosing, side effects, and monitoring parameters. Answered all questions regarding medication, cost, and assistance options to the best of my ability.    Dari Cox, PharmD  Transitions of Care  P: [phone]  [facility] secure chat preferred

## 2025-02-24 NOTE — CARE PLAN
The patient's goals for the shift include      The clinical goals for the shift include pt will remain HD stable. pain control    Over the shift, the patient did not make progress toward the following goals. Barriers to progression include . Recommendations to address these barriers include   Problem: Pain  Goal: Takes deep breaths with improved pain control throughout the shift  Outcome: Progressing  Goal: Turns in bed with improved pain control throughout the shift  Outcome: Progressing  Goal: Walks with improved pain control throughout the shift  Outcome: Progressing  Goal: Performs ADL's with improved pain control throughout shift  Outcome: Progressing  Goal: Participates in PT with improved pain control throughout the shift  Outcome: Progressing  Goal: Free from opioid side effects throughout the shift  Outcome: Progressing  Goal: Free from acute confusion related to pain meds throughout the shift  Outcome: Progressing     Problem: ACS/CP/NSTEMI/STEMI  Goal: Chest pain managed (free from pain or at acceptable level)  Outcome: Progressing  Goal: Lab values return to normal range  Outcome: Progressing  Goal: Promote self management  Outcome: Progressing  Goal: Serial ECG will return to baseline  Outcome: Progressing  Goal: Verbalize understanding of procedures/devices  Outcome: Progressing  Goal: Wean vasopressors/achieve hemodynamic stability  Outcome: Progressing     Problem: Arrythmia/Dysrhythmia  Goal: Lab values return to normal range  Outcome: Progressing  Goal: No evidence of post procedure complications  Outcome: Progressing  Goal: Promote self management  Outcome: Progressing  Goal: Serial ECG will return to baseline  Outcome: Progressing  Goal: Verbalize understanding of procedures/devices  Outcome: Progressing  Goal: Vital signs return to baseline  Outcome: Progressing  Goal: Care and maintenance of device (specify)  Outcome: Progressing   .

## 2025-02-25 ENCOUNTER — APPOINTMENT (OUTPATIENT)
Dept: RADIOLOGY | Facility: HOSPITAL | Age: 80
DRG: 322 | End: 2025-02-25
Payer: MEDICARE

## 2025-02-25 ENCOUNTER — PHARMACY VISIT (OUTPATIENT)
Dept: PHARMACY | Facility: CLINIC | Age: 80
End: 2025-02-25
Payer: COMMERCIAL

## 2025-02-25 VITALS
HEART RATE: 78 BPM | HEIGHT: 68 IN | OXYGEN SATURATION: 98 % | RESPIRATION RATE: 16 BRPM | WEIGHT: 158.07 LBS | DIASTOLIC BLOOD PRESSURE: 78 MMHG | SYSTOLIC BLOOD PRESSURE: 144 MMHG | TEMPERATURE: 98 F | BODY MASS INDEX: 23.96 KG/M2

## 2025-02-25 PROBLEM — I21.4 NSTEMI (NON-ST ELEVATED MYOCARDIAL INFARCTION) (MULTI): Status: RESOLVED | Noted: 2025-02-22 | Resolved: 2025-02-25

## 2025-02-25 LAB
ANION GAP SERPL CALC-SCNC: 12 MMOL/L (ref 10–20)
BNP SERPL-MCNC: 283 PG/ML (ref 0–99)
BUN SERPL-MCNC: 13 MG/DL (ref 6–23)
CALCIUM SERPL-MCNC: 9.1 MG/DL (ref 8.6–10.3)
CHLORIDE SERPL-SCNC: 107 MMOL/L (ref 98–107)
CO2 SERPL-SCNC: 23 MMOL/L (ref 21–32)
CREAT SERPL-MCNC: 0.97 MG/DL (ref 0.5–1.3)
EGFRCR SERPLBLD CKD-EPI 2021: 80 ML/MIN/1.73M*2
ERYTHROCYTE [DISTWIDTH] IN BLOOD BY AUTOMATED COUNT: 12.4 % (ref 11.5–14.5)
GLUCOSE SERPL-MCNC: 105 MG/DL (ref 74–99)
HCT VFR BLD AUTO: 42.2 % (ref 41–52)
HGB BLD-MCNC: 14.6 G/DL (ref 13.5–17.5)
MCH RBC QN AUTO: 30.3 PG (ref 26–34)
MCHC RBC AUTO-ENTMCNC: 34.6 G/DL (ref 32–36)
MCV RBC AUTO: 88 FL (ref 80–100)
NRBC BLD-RTO: 0 /100 WBCS (ref 0–0)
PLATELET # BLD AUTO: 145 X10*3/UL (ref 150–450)
POTASSIUM SERPL-SCNC: 4.6 MMOL/L (ref 3.5–5.3)
RBC # BLD AUTO: 4.82 X10*6/UL (ref 4.5–5.9)
SODIUM SERPL-SCNC: 137 MMOL/L (ref 136–145)
WBC # BLD AUTO: 7.6 X10*3/UL (ref 4.4–11.3)

## 2025-02-25 PROCEDURE — 71046 X-RAY EXAM CHEST 2 VIEWS: CPT

## 2025-02-25 PROCEDURE — 83880 ASSAY OF NATRIURETIC PEPTIDE: CPT

## 2025-02-25 PROCEDURE — 99239 HOSP IP/OBS DSCHRG MGMT >30: CPT | Performed by: INTERNAL MEDICINE

## 2025-02-25 PROCEDURE — RXMED WILLOW AMBULATORY MEDICATION CHARGE

## 2025-02-25 PROCEDURE — 99233 SBSQ HOSP IP/OBS HIGH 50: CPT

## 2025-02-25 PROCEDURE — 71046 X-RAY EXAM CHEST 2 VIEWS: CPT | Performed by: RADIOLOGY

## 2025-02-25 PROCEDURE — 2500000001 HC RX 250 WO HCPCS SELF ADMINISTERED DRUGS (ALT 637 FOR MEDICARE OP): Performed by: NURSE PRACTITIONER

## 2025-02-25 PROCEDURE — 36415 COLL VENOUS BLD VENIPUNCTURE: CPT

## 2025-02-25 PROCEDURE — 80048 BASIC METABOLIC PNL TOTAL CA: CPT

## 2025-02-25 PROCEDURE — 2500000004 HC RX 250 GENERAL PHARMACY W/ HCPCS (ALT 636 FOR OP/ED)

## 2025-02-25 PROCEDURE — 2500000002 HC RX 250 W HCPCS SELF ADMINISTERED DRUGS (ALT 637 FOR MEDICARE OP, ALT 636 FOR OP/ED): Performed by: NURSE PRACTITIONER

## 2025-02-25 PROCEDURE — 85027 COMPLETE CBC AUTOMATED: CPT

## 2025-02-25 RX ORDER — NAPROXEN SODIUM 220 MG/1
81 TABLET, FILM COATED ORAL DAILY
Qty: 90 TABLET | Refills: 0 | Status: SHIPPED | OUTPATIENT
Start: 2025-02-25 | End: 2025-05-26

## 2025-02-25 RX ORDER — FUROSEMIDE 10 MG/ML
40 INJECTION INTRAMUSCULAR; INTRAVENOUS ONCE
Status: COMPLETED | OUTPATIENT
Start: 2025-02-25 | End: 2025-02-25

## 2025-02-25 RX ADMIN — ASPIRIN 81 MG CHEWABLE TABLET 81 MG: 81 TABLET CHEWABLE at 08:34

## 2025-02-25 RX ADMIN — TICAGRELOR 90 MG: 90 TABLET ORAL at 08:35

## 2025-02-25 RX ADMIN — TAMSULOSIN HYDROCHLORIDE 0.4 MG: 0.4 CAPSULE ORAL at 08:35

## 2025-02-25 RX ADMIN — PANTOPRAZOLE SODIUM 40 MG: 40 TABLET, DELAYED RELEASE ORAL at 06:14

## 2025-02-25 RX ADMIN — LEVOTHYROXINE SODIUM 50 MCG: 0.05 TABLET ORAL at 06:23

## 2025-02-25 RX ADMIN — FUROSEMIDE 40 MG: 10 INJECTION, SOLUTION INTRAVENOUS at 11:52

## 2025-02-25 ASSESSMENT — PAIN SCALES - GENERAL: PAINLEVEL_OUTOF10: 0 - NO PAIN

## 2025-02-25 NOTE — ASSESSMENT & PLAN NOTE
79-year-old gentleman with history of hypertension, hypothyroidism, BPH, paroxysmal atrial fibrillation, Raynaud's phenomenon and previous history of gout presents with midsternal chest discomfort radiating across his chest and neck and left arm with intermittent esophageal reflux for last about 8 to 10 days worse over past 3 to 4 days and therefore he presented to the emergency room.  EKG revealed inferolateral subendocardial ischemia and his serum troponin level elevated at 637 653 and 1013 all these findings consistent with NSTEMI.    1.  NSTEMI had 2 or 3 episodes of brief chest discomfort this morning, relieved with nitroglycerin.  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  Troponins are [phone] and 1013 and 1266  Cardiology on board.  Patient had left heart catheterization on Monday, February 24, 2025.  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  Had an episode of shortness of breath this morning, chest x-ray is clear and clinically no evidence to support the diagnosis of heart failure.    2.  History of paroxysmal atrial fibrillation, currently in sinus rhythm with sinus bradycardia    3.  Hypertension, blood pressures are stable and acceptable    4.  History of BPH, patient is on tamsulosin which is being held    5.  Hypothyroidism, continue with levothyroxine    6.  Hyperlipidemia with LDL of 100 and HDL of 39 and total cholesterol of 187, patient was taking Crestor 5 mg daily which has been increased to 20 mg daily.    Reviewed EKG echocardiogram all labs and imaging studies and discussed the plan of treatment with patient. Cardiology recommendation appreciated.

## 2025-02-25 NOTE — DISCHARGE SUMMARY
ADMISSION DATE: 2/22/2025     DISCHARGE DATE:  02/25/25     Discharge Diagnosis  NSTEMI (non-ST elevated myocardial infarction) (Multi)  Status post left heart catheterization and PCI and stent placement in mid right coronary artery which had 99% stenosis, also revealed 80% stenosis of the second obtuse marginal.  Hypertension  Hyperlipidemia  Hypothyroidism  BPH    Issues Requiring Follow-Up  Follow-up with PCP in 1 week and cardiology in 2 weeks.        Discharge Meds     Your medication list        START taking these medications        Instructions Last Dose Given Next Dose Due   Brilinta 90 mg tablet  Generic drug: ticagrelor      Take 1 tablet (90 mg) by mouth 2 times a day.              CHANGE how you take these medications        Instructions Last Dose Given Next Dose Due   pantoprazole 20 mg EC tablet  Commonly known as: Protonix  What changed: Another medication with the same name was removed. Continue taking this medication, and follow the directions you see here.      Take 1 tablet (20 mg) by mouth 2 times a day. Take before breakfast and before dinner Do not crush, chew, or split.       rosuvastatin 40 mg tablet  Commonly known as: Crestor  What changed:   medication strength  how much to take      Take 1 tablet (40 mg) by mouth once daily at bedtime.              CONTINUE taking these medications        Instructions Last Dose Given Next Dose Due   aspirin 81 mg chewable tablet      Chew 1 tablet (81 mg) once daily.       famotidine 20 mg tablet  Commonly known as: Pepcid      Take 1 tablet (20 mg) by mouth once daily at bedtime.       levothyroxine 50 mcg tablet  Commonly known as: Synthroid, Levoxyl      Take 1 tablet (50 mcg) by mouth once every day.       tamsulosin 0.4 mg 24 hr capsule  Commonly known as: Flomax      Take 1 capsule (0.4 mg) by mouth once daily.              STOP taking these medications      pravastatin 10 mg tablet  Commonly known as: Pravachol                  Where to Get Your  Komal Steele presents today for   Chief Complaint   Patient presents with    Annual Exam       Is someone accompanying this pt? no    Is the patient using any DME equipment during OV? no    Depression Screenin/11/2023     9:29 AM 2022     2:03 PM 2022     9:20 AM   PHQ-9 Questionaire   Little interest or pleasure in doing things 0 0 0   Feeling down, depressed, or hopeless 0 0 0   PHQ-9 Total Score 0 0 0       Fall Risk       No data to display                 Health Maintenance reviewed and discussed and ordered per Provider. Health Maintenance Due   Topic Date Due    HIV screen  Never done    Shingles vaccine (1 of 2) Never done    Breast cancer screen  2019    Cervical cancer screen  2022    COVID-19 Vaccine (5 - Moderna series) 2022    Flu vaccine (1) 2023   . Coordination of Care:    1. \"Have you been to the ER, urgent care clinic since your last visit? Hospitalized since your last visit? \" No    2. \"Have you seen or consulted any other health care providers outside of the [address] [place] [address] since your last visit? \" No     3. For patients aged 43-73: Has the patient had a colonoscopy / FIT/ Cologuard? Yes - no Care Gap present      If the patient is female:    4. For patients aged 43-66: Has the patient had a mammogram within the past 2 years? No      5. For patients aged 21-65: Has the patient had a pap smear? Yes - Care Gap present.  Rooming MA/LPN to request most recent results Medications        These medications were sent to [facility]  [id number] , [address], [facility] [address]      Hours: 8 AM to 6 PM Mon-Fri, 9 AM to 1 PM Saturday Phone: [phone]   aspirin 81 mg chewable tablet  Brilinta 90 mg tablet  rosuvastatin 40 mg tablet             Results for orders placed or performed during the hospital encounter of 02/22/25 (from the past 24 hours)   CBC   Result Value Ref Range    WBC 7.6 4.4 - 11.3 x10*3/uL    nRBC 0.0 0.0 - 0.0 /100 WBCs    RBC 4.82 4.50 - 5.90 x10*6/uL    Hemoglobin 14.6 13.5 - 17.5 g/dL    Hematocrit 42.2 41.0 - 52.0 %    MCV 88 80 - 100 fL    MCH 30.3 26.0 - 34.0 pg    MCHC 34.6 32.0 - 36.0 g/dL    RDW 12.4 11.5 - 14.5 %    Platelets 145 (L) 150 - 450 x10*3/uL   Basic Metabolic Panel   Result Value Ref Range    Glucose 105 (H) 74 - 99 mg/dL    Sodium 137 136 - 145 mmol/L    Potassium 4.6 3.5 - 5.3 mmol/L    Chloride 107 98 - 107 mmol/L    Bicarbonate 23 21 - 32 mmol/L    Anion Gap 12 10 - 20 mmol/L    Urea Nitrogen 13 6 - 23 mg/dL    Creatinine 0.97 0.50 - 1.30 mg/dL    eGFR 80 >60 mL/min/1.73m*2    Calcium 9.1 8.6 - 10.3 mg/dL   B-Type Natriuretic Peptide   Result Value Ref Range     (H) 0 - 99 pg/mL    XR chest 2 views    Result Date: 2/25/2025  Interpreted By:  Jaylene Jackson, STUDY: XR CHEST 2 VIEWS;  2/25/2025 11:22 am   INDICATION: Signs/Symptoms:Dyspnea, evaluated for pleural effusions.   COMPARISON: 02/22/2025   ACCESSION NUMBER(S): [id number]   ORDERING CLINICIAN: JORJE JACK   FINDINGS: The heart is not enlarged. No infiltrate, pleural effusion or pneumothorax is seen. Linear densities are seen in the right lung base, which may be related to discoid atelectasis or scarring. Degenerative changes are seen in thoracic spine.       Discoid atelectasis versus scarring in right lung base.     MACRO: None   Signed by: Jaylene Jackson 2/25/2025 11:39 AM Dictation workstation:   [id number]    Cardiac Catheterization Procedure    Result  Date: 2/25/2025   [facility], Cath Lab, [facility] [address] Cardiovascular Catheterization Report Patient Name:      IRIS FIGUEROA     Performing Physician:  [id number] Chai Solomon MD Study Date:        2/24/2025          Verifying Physician:   [id number] Solomon MD MRN/PID:           [id number]           Cardiologist/Co-Scrub: Accession#:        [id number]       Ordering Provider:     [id number] BEATRIS BEAR Date of Birth/Age: 1945 / 79      Cardiologist:                    years Gender:            M                  Fellow: Encounter#:        [id number]         Surgeon:  Study:            Left Heart Cath with PCI Additional Study: Coronary Arteriogram Additional Study: OCT - Optical Coherence Tomography  Indications: IRIS FIGUEROA is a 80 year old male who presents with dyslipidemia, hypertension and a chest pain assessment of atypical angina. NSTE - ACS. Medical History: Stress test performed: No. CTA performed: No. [place]atston accessed: No. LVEF Assessed: Yes. LVEF = 60%. Cardiac arrest: No. Cardiac surgical consult: No. Cardiovascular instability: No. Frailty status of patient entering lab: 6 = Moderately frail.  Procedure Description: After infiltration with 1% Lidocaine, the right radial artery was cannulated with a modified Seldinger technique. Subsequently a 6 Malay sheath was placed in the right radial artery. Selective coronary catheterization was performed using a 4 Fr, 5 Fr and 6 Fr catheter(s) exchanged over a guide wire to cannulate the coronary arteries. A JL 3.5 tip catheter was used for left coronary injections. A JR 4 tip catheter was used for right coronary injections. Multiple injections of contrast were made into the left and right coronary arteries with angiograms recorded in multiple  drug: triamcinolone     NONFORMULARY     ONE-A-DAY WOMENS FORMULA PO     PARoxetine Mesylate 7.5 MG Caps     Potassium 99 MG Tabs     Stress ReLeaf Caps     SUPER B COMPLEX PO     TRIPLE FLEX BONE & JOINT PO     Tums 500 MG chewable tablet  Generic drug: calcium carbonate     Vitamin A 2400 MCG (8000 UT) Caps     Vitamin B-12 5000 MCG Tbdp     vitamin C 1000 MG tablet     Vitamin D3 50 MCG (2000 UT) Caps              ISMAEL Morse - KATHE                          Disclaimer:    I have discussed the diagnosis with the patient and the intended plan as seen above. The patient understands our medical plan. The risks, benefits and significant side effects of all medications have been reviewed. Anticipated time course and progression of condition reviewed. All questions have been addressed. She received an after visit summary, with information reviewed, and questions answered. Where appropriate, she is instructed to call the clinic if she has not been notified either by phone or through [address] [place] [address] with the results of her tests or with an appointment plan for any referrals within 1 week(s). The patient  is to call if her condition worsens or fails to improve or if significant side effects are experienced.        Milton Jeffers, APRN - CNP projections. After completion of the procedure, the arterial sheath was pulled and a TR Band Radial Compression Device was utilized to obtain patent hemostasis.  Procedure Description Comments: A JR4 was used to cross the aortic valve for LV pressure measurement and pullback.  Coronary Angiography: The coronary circulation is right dominant.  Left Main Coronary Artery: The left main coronary artery is a normal caliber vessel. The left main arises normally from the left coronary sinus of Valsalva and bifurcates into the LAD and circumflex coronary arteries. The left main coronary artery showed no significant disease or stenosis greater than 30%.  Left Anterior Descending Coronary Artery Distribution: The left anterior descending coronary artery is a normal caliber vessel. The LAD arises normally from the left main coronary artery. The LAD demonstrated moderate calcification. The proximal left anterior descending coronary artery showed 50% stenosis. The 1st diagonal branch is a small caliber vessel. The 1st diagonal branch showed no significant disease or stenosis greater than 30%. The 2nd diagonal branch is a very small caliber vessel. The 2nd diagonal branch demonstrated no significant disease or stenosis greater than 30%. The 3rd diagonal branch is a normal caliber vessel. The 3rd diagonal branch revealed no significant disease or stenosis greater than 30%.  Circumflex Coronary Artery Distribution: The circumflex coronary artery is a normal caliber vessel. The circumflex arises normally from the left main coronary artery and terminates in the AV groove. The circumflex revealed no significant disease or stenosis greater than 30%. The 1st obtuse marginal branch is a very small caliber vessel. The 1st obtuse marginal branch showed no significant disease or stenosis greater than 30%. The 2nd obtuse marginal branch is a normal caliber vessel. The mid 2nd obtuse marginal branch demonstrated 80% stenosis. The 3rd obtuse  marginal branch is a small caliber vessel. The 3rd obtuse marginal branch revealed no significant disease or stenosis greater than 30%.  Right Coronary Artery Distribution: The right coronary artery is a normal caliber vessel. The RCA arises normally from the right sinus of Valsalva. The RCA showed moderate calcification. The mid right coronary artery showed 99% stenosis. This lesion was thrombotic.  Valve Findings: No aortic valve stenosis is visualized.  Coronary Interventions: Angiography reveals a 99% stenosis of the mid right coronary artery coronary artery. Pre-intervention TITO flow was 2. Percutaneous coronary intervention was performed within the mid right coronary artery. The stenosis was successfully reduced from 99% to 0%. Post-intervention TITO flow was 3. Successful OCT guided PCI of the mid RCA with a 3.5 x 32 mm Synergy drug-eluting stent postdilated up to 3.75 mm.  Coronary Intervention Comments: An [employer] R 1.0 guide was used to engage the RCA. A run-through wire was placed in the distal RCA. The lesion was predilated with a 2.0 x 15 mm semicompliant balloon at 12 usha. 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 The lesion was then predilated with a 3.0 x 20 mm noncompliant balloon at 14 usha. A 3.5 x 32 mm Synergy drug-eluting stent was then deployed at 14 usha. The stent was postdilated with a 3.5 x 15 mm noncompliant balloon up to 22 usha. The proximal stent was then postdilated with a 3.75 x 8 mm noncompliant balloon up to 20 usha. OCT was then performed and revealed a well-expanded and apposed stent without evidence of dissection. Angiography revealed TITO-3 flow and 0% residual stenosis.  Coronary Lesion Summary: Vessel   Stenosis   Vessel Segment LAD    50% stenosis    proximal OM 2   80% stenosis      mid RCA    99% stenosis      mid  Hemo Personnel: +----------------+---------+ Name            Duty       +----------------+---------+ Chai Solomon MD 1 +----------------+---------+  Hemodynamic Pressures:  +----+---------------+----------+-------------+--------------+-------+---------+ Site   Date Time   Phase NameSystolic mmHgDiastolic mmHgED mmHgMean mmHg +----+---------------+----------+-------------+--------------+-------+---------+   AO      2/24/2025  AIR REST          112            57              78         11:49:38 AM                                                      +----+---------------+----------+-------------+--------------+-------+---------+   LV      2/24/2025  AIR REST          119             7     27                  11:56:11 AM                                                      +----+---------------+----------+-------------+--------------+-------+---------+   LV      2/24/2025  AIR REST          117             7     26                  11:56:20 AM                                                      +----+---------------+----------+-------------+--------------+-------+---------+  LVp      2/24/2025  AIR REST          115             8     25                  11:56:24 AM                                                      +----+---------------+----------+-------------+--------------+-------+---------+  AOp      2/24/2025  AIR REST          116            60              82         11:56:31 AM                                                      +----+---------------+----------+-------------+--------------+-------+---------+   AO      2/24/2025  AIR REST          143            76             102         12:09:29 PM                                                      +----+---------------+----------+-------------+--------------+-------+---------+   AO      2/24/2025  AIR REST           89            59              71         12:22:09 PM                                                       +----+---------------+----------+-------------+--------------+-------+---------+   AO      2/24/2025  AIR REST          130            70              92         12:38:07 PM                                                      +----+---------------+----------+-------------+--------------+-------+---------+  Complications: No in-lab complications observed.  Cardiac Cath Post Procedure Notes: Post Procedure Diagnosis: CAD. Blood Loss:               Estimated blood loss during the procedure was 20 mls. Specimens Removed:        Number of specimen(s) removed: none.  Recommendations: Maximize medical therapy. DAPT for at least 12 months. Consder staged PCI of OM2. ____________________________________________________________________________________ CONCLUSIONS:  1. Culprit vessel(s): right coronary artery.  2. Successful OCT guided PCI of the mid RCA with a 3.5 x 32 mm Synergy drug-eluting stent postdilated up to 3.75 mm.  3. 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  4. Moderate CAD of the LAD.  5. Elevated LVEDP.  6. No evidence of aortic stenosis. ICD 10 Codes: Non ST elevation (NSTEMI) myocardial infarction-I21.4  CPT Codes: Left Heart Cath (visualization of coronaries) and LV-77698; Revasc during AMI stent/angio/atherc,ins asp Thrombectomy, Right Coronary single Vessel (PCI)-63693.RC; OCT Initial Vessel (coronary native vessel or graft) during diagnostic evaluation and/or therapeutic intervention, initial vessel-62198  [id number] Chai Solomon MD Performing Physician Electronically signed by [id number] Chai Solomon MD on 2/24/2025 at 5:48:12 PM  ** Final (Updated) **     ECG 12 Lead    Result Date: 2/24/2025  Sinus bradycardia with sinus arrhythmia ST & T wave abnormality, consider inferior ischemia Abnormal ECG Confirmed by Francisco De La Torre ([id number]) on 2/24/2025 9:50:54 AM    Electrocardiogram, 12-lead PRN ACS symptoms    Result Date: 2/24/2025  Sinus bradycardia ST & T wave  abnormality, consider inferior ischemia Abnormal ECG Confirmed by Francisco De La Torre ([id number]) on 2/24/2025 9:50:10 AM    ECG 12 lead    Result Date: 2/24/2025  Sinus bradycardia with sinus arrhythmia T wave abnormality, consider inferolateral ischemia Abnormal ECG When compared with ECG of 23-MAY-2024 22:08, Questionable change in QRS axis T wave inversion now evident in Inferior leads T wave inversion now evident in Lateral leads    Transthoracic Echo (TTE) Complete    Result Date: 2/22/2025   [facility], [address] [place] [address]              Tel [phone] and Fax [phone] TRANSTHORACIC ECHOCARDIOGRAM REPORT  Patient Name:       FRANCISCO FIGUEROA      Reading Physician:    [id number] Ander Abraham DO Study Date:         2/22/2025           Ordering Provider:    [id number] BEAR MRN/PID:            [id number]            Fellow: Accession#:         [id number]        Nurse: Date of Birth/Age:  1945 / 79 years Sonographer:          Abraham Knowles                                                               [facility] Gender assigned at  M                   Additional Staff: Birth: Height:             173.00 cm           Admit Date: Weight:             71.70 kg            Admission Status:     Inpatient -                                                               Routine BSA / BMI:          1.85 m2 / 23.96     Encounter#:           [id number]                     kg/m2 Blood Pressure:     144/81 mmHg         Department Location:  [facility] Non                                                               Invasive Study Type:    TRANSTHORACIC ECHO (TTE) COMPLETE Diagnosis/ICD: Non ST elevation (NSTEMI) myocardial infarction-I21.4 Indication:    Acute Coronary Syndrome: Non-STEMI CPT Code:      Echo Complete w Full Doppler-[id number] Patient History: Pertinent History:  Hyperlipidemia, Dyspnea and Chest Pain. Study Detail: The following Echo studies were performed: 2D, M-Mode, Doppler and               color flow.  PHYSICIAN INTERPRETATION: Left Ventricle: The left ventricular systolic function is normal, with a visually estimated ejection fraction of 55-60%. There are no regional left ventricular wall motion abnormalities. The left ventricular cavity size is normal. There is mildly increased septal and normal posterior left ventricular wall thickness. There is left ventricular concentric remodeling. Spectral Doppler shows a Grade I (impaired relaxation pattern) of left ventricular diastolic filling with normal left atrial filling pressure. Left Atrium: The left atrial size is normal. Right Ventricle: The right ventricle is normal in size. There is normal right ventricular global systolic function. Right Atrium: The right atrium is normal in size. Aortic Valve: The aortic valve is trileaflet. There is mild aortic valve thickening. There is mild aortic valve regurgitation. The peak instantaneous gradient of the aortic valve is 8 mmHg. Mitral Valve: The mitral valve is mildly thickened. There is mild mitral annular calcification. The peak instantaneous gradient of the mitral valve is 3 mmHg. There is trace mitral valve regurgitation. Tricuspid Valve: The tricuspid valve is structurally normal. There is trace tricuspid regurgitation. The Doppler estimated RVSP is within normal limits at 26.6 mmHg. Pulmonic Valve: The pulmonic valve is structurally normal. There is trace pulmonic valve regurgitation. Pericardium: There is no pericardial effusion noted. Aorta: The aortic root is normal. Systemic Veins: The inferior vena cava appears normal in size. In comparison to the previous echocardiogram(s): There are no prior studies on this patient for comparison purposes.  CONCLUSIONS:  1. The left ventricular systolic function is normal, with a visually estimated ejection fraction of 55-60%.   2. Spectral Doppler shows a Grade I (impaired relaxation pattern) of left ventricular diastolic filling with normal left atrial filling pressure.  3. Right ventricular systolic pressure is within normal limits.  4. Mild aortic valve regurgitation. QUANTITATIVE DATA SUMMARY:  2D MEASUREMENTS:          Normal Ranges: LAs:             3.90 cm  (2.7-4.0cm) IVSd:            1.08 cm  (0.6-1.1cm) LVPWd:           0.96 cm  (0.6-1.1cm) LVIDd:           3.65 cm  (3.9-5.9cm) LVIDs:           2.58 cm LV Mass Index:   61 g/m2 LVEDV Index:     46 ml/m2 LV % FS          29.4 %  LEFT ATRIUM:                  Normal Ranges: LA Vol A4C:        33.7 ml    (22+/-6mL/m2) LA Vol A2C:        54.0 ml LA Vol BP:         46.6 ml LA Vol Index A4C:  18.2 ml/m2 LA Vol Index A2C:  29.2 ml/m2 LA Vol Index BP:   25.2 ml/m2 LA Area A4C:       13.5 cm2 LA Area A2C:       18.7 cm2 LA Major Axis A4C: 4.6 cm LA Major Axis A2C: 5.5 cm LA Volume Index:   25.2 ml/m2 LA Vol A4C:        30.9 ml LA Vol A2C:        51.0 ml LA Vol Index BSA:  22.1 ml/m2  RIGHT ATRIUM:                 Normal Ranges: RA Vol A4C:        74.2 ml    (8.3-19.5ml) RA Vol Index A4C:  40.1 ml/m2 RA Area A4C:       22.3 cm2 RA Major Axis A4C: 5.7 cm  M-MODE MEASUREMENTS:         Normal Ranges: LAs:                 4.14 cm (2.7-4.0cm)  LV SYSTOLIC FUNCTION:                      Normal Ranges: EF-A4C View:    52 % (>=55%) EF-A2C View:    58 % EF-Biplane:     54 % EF-Visual:      58 % LV EF Reported: 58 %  LV DIASTOLIC FUNCTION:             Normal Ranges: MV Peak E:             0.66 m/s    (0.7-1.2 m/s) MV Peak A:             0.79 m/s    (0.42-0.7 m/s) E/A Ratio:             0.83        (1.0-2.2) MV A Dur:              161.75 msec PulmV Sys Peter:         44.69 cm/s PulmV Velasquez Peter:        42.81 cm/s PulmV S/D Peter:         1.04 PulmV A Revs Peter:      19.67 cm/s PulmV A Revs Dur:      180.78 msec  MITRAL VALVE:          Normal Ranges: MV Vmax:      0.91 m/s (<=1.3m/s) MV peak PG:   3.3 mmHg  (<5mmHg) MV mean P.2 mmHg (<2mmHg) MV VTI:       37.13 cm (10-13cm) MV DT:        259 msec (150-240msec)  AORTIC VALVE:           Normal Ranges: AoV Vmax:      1.37 m/s (<=1.7m/s) AoV Peak P.5 mmHg (<20mmHg) LVOT Max Peter:  1.33 m/s (<=1.1m/s) LVOT VTI:      24.35 cm LVOT Diameter: 2.13 cm  (1.8-2.4cm) AoV Area,Vmax: 3.46 cm2 (2.5-4.5cm2)  RIGHT VENTRICLE: RV Basal 4.00 cm RV Mid   3.60 cm RV Major 7.0 cm RV s'    0.13 m/s  TRICUSPID VALVE/RVSP:          Normal Ranges: Peak TR Velocity:     2.43 m/s RV Syst Pressure:     27 mmHg  (< 30mmHg)  PULMONIC VALVE:          Normal Ranges: PV Accel Time:  95 msec  (>120ms) PV Max Peter:     1.1 m/s  (0.6-0.9m/s) PV Max P.9 mmHg  PULMONARY VEINS: PulmV A Revs Dur: 180.78 msec PulmV A Revs Peter: 19.67 cm/s PulmV Velasquez Peter:   42.81 cm/s PulmV S/D Peter:    1.04 PulmV Sys Peter:    44.69 cm/s  AORTA: Asc Ao Diam 3.54 cm  [id number] Ander Abraham DO Electronically signed on 2025 at 9:12:40 PM  ** Final **     XR chest 2 views    Result Date: 2025  Interpreted By:  Miakl Sauer, STUDY: XR CHEST 2 VIEWS;  2025 10:46 am   INDICATION: Signs/Symptoms:chest pain.     COMPARISON: 2024   ACCESSION NUMBER(S): [id number]   ORDERING CLINICIAN: IRIS MORELAND   FINDINGS:         CARDIOMEDIASTINAL SILHOUETTE: Cardiomediastinal silhouette is normal in size and configuration.   LUNGS: There is minimal atelectasis at the bases. There is no consolidation or effusion   ABDOMEN: No remarkable upper abdominal findings.   BONES: No acute osseous changes.       1. Bibasilar atelectatic changes. No consolidation seen       MACRO: None   Signed by: Mikal Sauer 2025 10:57 AM Dictation workstation:   [id number]          Hospital Course   NSTEMI (non-ST elevated myocardial infarction) (Multi)  79-year-old gentleman with history of hypertension, hypothyroidism, BPH, paroxysmal atrial fibrillation, Raynaud's phenomenon and previous history of gout presents with  midsternal chest discomfort radiating across his chest and neck and left arm with intermittent esophageal reflux for last about 8 to 10 days worse over past 3 to 4 days and therefore he presented to the emergency room.  EKG revealed inferolateral subendocardial ischemia and his serum troponin level elevated at 637 [id number] and 1013 all these findings consistent with NSTEMI.     1.  NSTEMI had 2 or 3 episodes of brief chest discomfort this morning, relieved with nitroglycerin.  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  Troponins are [id number] and 1013 and 1266  Cardiology on board.  Patient had left heart catheterization on Monday, February 24, 2025.  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  Had an episode of shortness of breath this morning, chest x-ray is clear and clinically no evidence to support the diagnosis of heart failure.     2.  History of paroxysmal atrial fibrillation, currently in sinus rhythm with sinus bradycardia     3.  Hypertension, blood pressures are stable and acceptable     4.  History of BPH, patient is on tamsulosin which is being held     5.  Hypothyroidism, continue with levothyroxine     6.  Hyperlipidemia with LDL of 100 and HDL of 39 and total cholesterol of 187, patient was taking Crestor 5 mg daily which has been increased to 20 mg daily.     Reviewed EKG echocardiogram all labs and imaging studies and discussed the plan of treatment with patient. Cardiology recommendation appreciated.  Hemodynamically stable, to be discharged home today.  Patient and his daughter was in the room is updated.  I spent 35 minutes in the professional and overall care of this patient.              Pertinent Physical Exam At Time of Discharge    GENERAL:  Alert, no  "distress, cooperative  SKIN:  Skin color, texture, turgor normal. No rashes or lesions.  OROPHARYNX:  Lips, mucosa, and tongue are normal.Teeth and gums, normal. Oropharynx normal.  NECK:  No jugulovenous distention, No carotid bruits, Carotid pulse normal contour, Supple  LUNGS:  Lungs clear to auscultation. Good diaphragmatic excursion.  CARDIAC: Rate and rhythm is regular, normal S1 and S2; no rubs, murmurs, or gallops  ABDOMEN:  Abdomen soft, non-tender, BS normal, No masses or organomegaly  EXTREMETIES:  Extremities normal, no deformities, edema, clubbing or skin discoloration. Good capillary refill., No ulcers  NEURO:  Alert, oriented X 3,  Non-focal. Reflexes normal and symmetric. Sensation grossly intact., Cranial nerves II-XII intact  PULSES:  2+ radial, 2+ carotid     Last Recorded Vitals  Blood pressure 129/80, pulse 72, temperature 36.4 °C (97.6 °F), temperature source Temporal, resp. rate 16, height 1.727 m (5' 7.99\"), weight 71.7 kg (158 lb 1.1 oz), SpO2 96%.  Intake/Output last 3 Shifts:  I/O last 3 completed shifts:  In: 235.4 (3.3 mL/kg) [I.V.:235.4 (3.3 mL/kg)]  Out: 0 (0 mL/kg)   Weight: 71.7 kg          Outpatient Follow-Up  Future Appointments   Date Time Provider Department Center   3/3/2025  1:00 PM James Calix MD [id number]   1/14/2026  2:45 PM Linda Baires MD [id number]         Flynn Castillo MD  "

## 2025-02-25 NOTE — PROGRESS NOTES
"Francisco Lizama is a 79 y.o. male on day 2 of admission presenting with NSTEMI (non-ST elevated myocardial infarction) (Multi).    Subjective   Late entry note, patient seen and examined, patient's wife is on his bedside.  Patient is status post left heart catheterization and noted to have two-vessel disease in the right dominant system and underwent PCI and stent placement of the mid RCA successfully.  Post stent placement is doing fairly well, no chest pain and no shortness of breath.  Blood pressures are stable.       Objective     Physical Exam  GENERAL:  Alert, no distress, cooperative  SKIN:  Skin color, texture, turgor normal. No rashes or lesions.  OROPHARYNX:  Lips, mucosa, and tongue are normal.Teeth and gums, normal. Oropharynx normal.  NECK:  No jugulovenous distention, No carotid bruits, Carotid pulse normal contour, Supple  LUNGS:  Lungs clear to auscultation. Good diaphragmatic excursion.  CARDIAC: Rate and rhythm is regular, normal S1 and S2; no rubs, murmurs, or gallops  ABDOMEN:  Abdomen soft, non-tender, BS normal, No masses or organomegaly  EXTREMETIES:  Extremities normal, no deformities, edema, clubbing or skin discoloration. Good capillary refill., No ulcers  NEURO:  Alert, oriented X 3,  Non-focal. Reflexes   PULSES:  2+ radial, 2+ carotid    Last Recorded Vitals  Blood pressure 127/63, pulse 59, temperature 36.6 °C (97.9 °F), temperature source Skin, resp. rate 18, height 1.727 m (5' 7.99\"), weight 71.7 kg (158 lb 1.1 oz), SpO2 97%.  Intake/Output last 3 Shifts:  I/O last 3 completed shifts:  In: 475.4 (6.6 mL/kg) [P.O.:240; I.V.:235.4 (3.3 mL/kg)]  Out: 0 (0 mL/kg)   Weight: 71.7 kg     Relevant Results  Scheduled medications  aspirin, 81 mg, oral, Daily  enoxaparin, 40 mg, subcutaneous, q24h  levothyroxine, 50 mcg, oral, Daily  pantoprazole, 40 mg, oral, Daily before breakfast  polyethylene glycol, 17 g, oral, Daily  rosuvastatin, 40 mg, oral, Nightly  tamsulosin, 0.4 mg, oral, " BID  ticagrelor, 90 mg, oral, BID      Continuous medications     PRN medications  PRN medications: acetaminophen **OR** acetaminophen **OR** acetaminophen, nitroglycerin, ondansetron **OR** ondansetron, oxygen     Results for orders placed or performed during the hospital encounter of 02/22/25 (from the past 96 hours)   ECG 12 lead   Result Value Ref Range    Ventricular Rate 57 BPM    Atrial Rate 57 BPM    NM Interval 206 ms    QRS Duration 86 ms    QT Interval 428 ms    QTC Calculation(Bazett) 416 ms    P Axis 66 degrees    R Axis 61 degrees    T Axis -54 degrees    QRS Count 10 beats    Q Onset 222 ms    P Onset 119 ms    P Offset 177 ms    T Offset 436 ms    QTC Fredericia 420 ms   CBC and Auto Differential   Result Value Ref Range    WBC 7.6 4.4 - 11.3 x10*3/uL    nRBC 0.0 0.0 - 0.0 /100 WBCs    RBC 5.37 4.50 - 5.90 x10*6/uL    Hemoglobin 16.2 13.5 - 17.5 g/dL    Hematocrit 46.6 41.0 - 52.0 %    MCV 87 80 - 100 fL    MCH 30.2 26.0 - 34.0 pg    MCHC 34.8 32.0 - 36.0 g/dL    RDW 12.5 11.5 - 14.5 %    Platelets 129 (L) 150 - 450 x10*3/uL    Neutrophils % 57.8 40.0 - 80.0 %    Immature Granulocytes %, Automated 0.3 0.0 - 0.9 %    Lymphocytes % 30.0 13.0 - 44.0 %    Monocytes % 9.9 2.0 - 10.0 %    Eosinophils % 1.6 0.0 - 6.0 %    Basophils % 0.4 0.0 - 2.0 %    Neutrophils Absolute 4.41 1.60 - 5.50 x10*3/uL    Immature Granulocytes Absolute, Automated 0.02 0.00 - 0.50 x10*3/uL    Lymphocytes Absolute 2.28 0.80 - 3.00 x10*3/uL    Monocytes Absolute 0.75 0.05 - 0.80 x10*3/uL    Eosinophils Absolute 0.12 0.00 - 0.40 x10*3/uL    Basophils Absolute 0.03 0.00 - 0.10 x10*3/uL   Comprehensive metabolic panel   Result Value Ref Range    Glucose 90 74 - 99 mg/dL    Sodium 138 136 - 145 mmol/L    Potassium 4.5 3.5 - 5.3 mmol/L    Chloride 106 98 - 107 mmol/L    Bicarbonate 26 21 - 32 mmol/L    Anion Gap 11 10 - 20 mmol/L    Urea Nitrogen 19 6 - 23 mg/dL    Creatinine 0.97 0.50 - 1.30 mg/dL    eGFR 80 >60 mL/min/1.73m*2     Calcium 9.6 8.6 - 10.3 mg/dL    Albumin 4.5 3.4 - 5.0 g/dL    Alkaline Phosphatase 65 33 - 136 U/L    Total Protein 7.0 6.4 - 8.2 g/dL    AST 26 9 - 39 U/L    Bilirubin, Total 1.3 (H) 0.0 - 1.2 mg/dL    ALT 17 10 - 52 U/L   Lipase   Result Value Ref Range    Lipase 11 9 - 82 U/L   Troponin I, High Sensitivity, Initial   Result Value Ref Range    Troponin I, High Sensitivity 653 (HH) 0 - 20 ng/L   Troponin, High Sensitivity, 1 Hour   Result Value Ref Range    Troponin I, High Sensitivity 637 (HH) 0 - 20 ng/L   Lipid Panel   Result Value Ref Range    Cholesterol 187 0 - 199 mg/dL    HDL-Cholesterol 39.1 mg/dL    Cholesterol/HDL Ratio 4.8     LDL Calculated 100 (H) <=99 mg/dL    VLDL 48 (H) 0 - 40 mg/dL    Triglycerides 239 (H) 0 - 149 mg/dL    Non HDL Cholesterol 148 0 - 149 mg/dL   TSH with reflex to Free T4 if abnormal   Result Value Ref Range    Thyroid Stimulating Hormone 3.35 0.44 - 3.98 mIU/L   Transthoracic Echo (TTE) Complete   Result Value Ref Range    AV pk hansa 1.37 m/s    LVOT diam 2.13 cm    MV E/A ratio 0.83     LA vol index A/L 25.2 ml/m2    LV EF 58 %    RV free wall pk S' 13.00 cm/s    LVIDd 3.65 cm    RVSP 26.6 mmHg    Aortic Valve Area by Continuity of Peak Velocity 3.46 cm2    AV pk grad 8 mmHg    LV A4C EF 52.3    Troponin I, High Sensitivity   Result Value Ref Range    Troponin I, High Sensitivity 1,013 (HH) 0 - 20 ng/L   Heparin Assay, UFH   Result Value Ref Range    Heparin Unfractionated 0.6 See Comment Below for Therapeutic Ranges IU/mL   Heparin Assay, UFH   Result Value Ref Range    Heparin Unfractionated 0.4 See Comment Below for Therapeutic Ranges IU/mL   Heparin Assay, UFH   Result Value Ref Range    Heparin Unfractionated 0.4 See Comment Below for Therapeutic Ranges IU/mL   Comprehensive metabolic panel   Result Value Ref Range    Glucose 109 (H) 74 - 99 mg/dL    Sodium 139 136 - 145 mmol/L    Potassium 4.3 3.5 - 5.3 mmol/L    Chloride 105 98 - 107 mmol/L    Bicarbonate 27 21 - 32 mmol/L     Anion Gap 11 10 - 20 mmol/L    Urea Nitrogen 17 6 - 23 mg/dL    Creatinine 0.99 0.50 - 1.30 mg/dL    eGFR 78 >60 mL/min/1.73m*2    Calcium 9.1 8.6 - 10.3 mg/dL    Albumin 4.2 3.4 - 5.0 g/dL    Alkaline Phosphatase 60 33 - 136 U/L    Total Protein 6.5 6.4 - 8.2 g/dL    AST 23 9 - 39 U/L    Bilirubin, Total 1.3 (H) 0.0 - 1.2 mg/dL    ALT 19 10 - 52 U/L   Troponin I, High Sensitivity   Result Value Ref Range    Troponin I, High Sensitivity 821 (HH) 0 - 20 ng/L   Heparin Assay, UFH   Result Value Ref Range    Heparin Unfractionated 0.4 See Comment Below for Therapeutic Ranges IU/mL   Lavender Top   Result Value Ref Range    Extra Tube Hold for add-ons.    ECG 12 Lead   Result Value Ref Range    Ventricular Rate 55 BPM    Atrial Rate 55 BPM    SD Interval 206 ms    QRS Duration 88 ms    QT Interval 420 ms    QTC Calculation(Bazett) 401 ms    P Axis 56 degrees    R Axis 19 degrees    T Axis -62 degrees    QRS Count 9 beats    Q Onset 223 ms    P Onset 120 ms    P Offset 181 ms    T Offset 433 ms    QTC Fredericia 408 ms   Troponin I, High Sensitivity   Result Value Ref Range    Troponin I, High Sensitivity 676 (HH) 0 - 20 ng/L   Electrocardiogram, 12-lead PRN ACS symptoms   Result Value Ref Range    Ventricular Rate 50 BPM    Atrial Rate 50 BPM    SD Interval 200 ms    QRS Duration 90 ms    QT Interval 406 ms    QTC Calculation(Bazett) 370 ms    P Axis 48 degrees    R Axis 8 degrees    T Axis -46 degrees    QRS Count 8 beats    Q Onset 222 ms    P Onset 122 ms    P Offset 178 ms    T Offset 425 ms    QTC Fredericia 382 ms   CBC   Result Value Ref Range    WBC 7.3 4.4 - 11.3 x10*3/uL    nRBC 0.0 0.0 - 0.0 /100 WBCs    RBC 4.63 4.50 - 5.90 x10*6/uL    Hemoglobin 14.1 13.5 - 17.5 g/dL    Hematocrit 41.4 41.0 - 52.0 %    MCV 89 80 - 100 fL    MCH 30.5 26.0 - 34.0 pg    MCHC 34.1 32.0 - 36.0 g/dL    RDW 12.6 11.5 - 14.5 %    Platelets 143 (L) 150 - 450 x10*3/uL   Troponin I, High Sensitivity   Result Value Ref Range     Troponin I, High Sensitivity 1,266 (HH) 0 - 20 ng/L   ACTIVATED CLOTTING TIME LOW   Result Value Ref Range    POCT Activated Clotting Time Low Range 297 (H) 83 - 199 sec   ACTIVATED CLOTTING TIME LOW   Result Value Ref Range    POCT Activated Clotting Time Low Range 327 (H) 83 - 199 sec   ACTIVATED CLOTTING TIME LOW   Result Value Ref Range    POCT Activated Clotting Time Low Range 300 (H) 83 - 199 sec    Cardiac Catheterization Procedure    Result Date: 2/24/2025   [facility], Cath Lab, [address] [place] [address] Cardiovascular Catheterization Report Patient Name:      IRIS FIGUEROA     Performing Physician:  [id number] Chai Solomon MD Study Date:        2/24/2025          Verifying Physician:   [id number] Solomon MD MRN/PID:           [id number]           Cardiologist/Co-Scrub: Accession#:        [id number]       Ordering Provider:     [id number] BEATRIS BEAR Date of Birth/Age: 1945 / 79      Cardiologist:                    years Gender:            M                  Fellow: Encounter#:        [id number]         Surgeon:  Study:            Left Heart Cath with PCI Additional Study: Coronary Arteriogram Additional Study: OCT - Optical Coherence Tomography  Indications: IRIS FIGUEROA is a 80 year old male who presents with dyslipidemia, hypertension and a chest pain assessment of atypical angina. NSTE - ACS. Medical History: Stress test performed: No. CTA performed: No. Jacquiekg accessed: No. LVEF Assessed: Yes. LVEF = 60%. Cardiac arrest: No. Cardiac surgical consult: No. Cardiovascular instability: No. Frailty status of patient entering lab: 6 = Moderately frail.  Procedure Description: After infiltration with 1% Lidocaine, the right radial artery was cannulated with a modified Seldinger technique. Subsequently  a 6 [place] sheath was placed in the right radial artery. Selective coronary catheterization was performed using a 4 Fr, 5 Fr and 6 Fr catheter(s) exchanged over a guide wire to cannulate the coronary arteries. A JL 3.5 tip catheter was used for left coronary injections. A JR 4 tip catheter was used for right coronary injections. Multiple injections of contrast were made into the left and right coronary arteries with angiograms recorded in multiple projections. After completion of the procedure, the arterial sheath was pulled and a TR Band Radial Compression Device was utilized to obtain patent hemostasis.  Procedure Description Comments: A JR4 was used to cross the aortic valve for LV pressure measurement and pullback.  Coronary Angiography: The coronary circulation is right dominant.  Left Main Coronary Artery: The left main coronary artery is a normal caliber vessel. The left main arises normally from the left coronary sinus of Valsalva and bifurcates into the LAD and circumflex coronary arteries. The left main coronary artery showed no significant disease or stenosis greater than 30%.  Left Anterior Descending Coronary Artery Distribution: The left anterior descending coronary artery is a normal caliber vessel. The LAD arises normally from the left main coronary artery. The LAD demonstrated moderate calcification. The proximal left anterior descending coronary artery showed 50% stenosis. The 1st diagonal branch is a small caliber vessel. The 1st diagonal branch showed no significant disease or stenosis greater than 30%. The 2nd diagonal branch is a very small caliber vessel. The 2nd diagonal branch demonstrated no significant disease or stenosis greater than 30%. The 3rd diagonal branch is a normal caliber vessel. The 3rd diagonal branch revealed no significant disease or stenosis greater than 30%.  Circumflex Coronary Artery Distribution: The circumflex coronary artery is a normal caliber vessel. The circumflex  arises normally from the left main coronary artery and terminates in the AV groove. The circumflex revealed no significant disease or stenosis greater than 30%. The 1st obtuse marginal branch is a very small caliber vessel. The 1st obtuse marginal branch showed no significant disease or stenosis greater than 30%. The 2nd obtuse marginal branch is a normal caliber vessel. The mid 2nd obtuse marginal branch demonstrated 80% stenosis. The 3rd obtuse marginal branch is a small caliber vessel. The 3rd obtuse marginal branch revealed no significant disease or stenosis greater than 30%.  Right Coronary Artery Distribution: The right coronary artery is a normal caliber vessel. The RCA arises normally from the right sinus of Valsalva. The RCA showed moderate calcification. The mid right coronary artery showed 99% stenosis. This lesion was thrombotic.  Valve Findings: No aortic valve stenosis is visualized.  Coronary Interventions: Angiography reveals a 99% stenosis of the mid right coronary artery coronary artery. Pre-intervention TITO flow was 2. Percutaneous coronary intervention was performed within the mid right coronary artery. The stenosis was successfully reduced from 99% to 0%. Post-intervention TITO flow was 3. Successful OCT guided PCI of the mid RCA with a 3.5 x 32 mm Synergy drug-eluting stent postdilated up to 3.75 mm.  Coronary Intervention Comments: An Ikari R 1.0 guide was used to engage the RCA. A run-through wire was placed in the distal RCA. The lesion was predilated with a 2.0 x 15 mm semicompliant balloon at 12 usha. 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 The lesion was then predilated with a 3.0 x 20 mm noncompliant balloon at 14 usha. A 3.5 x 32 mm Synergy drug-eluting stent was then deployed at 14 usha. The stent was postdilated with a 3.5 x 15 mm noncompliant balloon up to 22 usha. The proximal stent was then postdilated with a 3.75 x  8 mm noncompliant balloon up to 20 usha. OCT was then performed and revealed a well-expanded and apposed stent without evidence of dissection. Angiography revealed TITO-3 flow and 0% residual stenosis.  Coronary Lesion Summary: Vessel   Stenosis   Vessel Segment LAD    50% stenosis    proximal OM 2   80% stenosis      mid RCA    99% stenosis      mid  Hemo Personnel: +----------------+---------+ Name            Duty      +----------------+---------+ Chai Solomon MD 1 +----------------+---------+  Hemodynamic Pressures:  +----+---------------+----------+-------------+--------------+-------+---------+ Site   Date Time   Phase NameSystolic mmHgDiastolic mmHgED mmHgMean mmHg +----+---------------+----------+-------------+--------------+-------+---------+   AO      2/24/2025  A[facility]          112            57              78         11:49:38 AM                                                      +----+---------------+----------+-------------+--------------+-------+---------+   LV      2/24/2025  [facility]          119             7     27                  11:56:11 AM                                                      +----+---------------+----------+-------------+--------------+-------+---------+   LV      2/24/2025  [facility]          117             7     26                  11:56:20 AM                                                      +----+---------------+----------+-------------+--------------+-------+---------+  LVp      2/24/2025  [facility]          115             8     25                  11:56:24 AM                                                      +----+---------------+----------+-------------+--------------+-------+---------+  AOp      2/24/2025  [facility]          116            60              82         11:56:31 AM                                                       +----+---------------+----------+-------------+--------------+-------+---------+   AO      2/24/2025  [facility]          143            76             102         12:09:29 PM                                                      +----+---------------+----------+-------------+--------------+-------+---------+   AO      2/24/2025  [facility]           89            59              71         12:22:09 PM                                                      +----+---------------+----------+-------------+--------------+-------+---------+   AO      2/24/2025  A[facility] REST          130            70              92         12:38:07 PM                                                      +----+---------------+----------+-------------+--------------+-------+---------+  Complications: No in-lab complications observed.  Cardiac Cath Post Procedure Notes: Post Procedure Diagnosis: CAD. Blood Loss:               Estimated blood loss during the procedure was 20 mls. Specimens Removed:        Number of specimen(s) removed: none.  Recommendations: Maximize medical therapy. DAPT for at least 12 months. Consder staged PCI of OM2. ____________________________________________________________________________________ CONCLUSIONS:  1. Culprit vessel(s): right coronary artery.  2. Successful OCT guided PCI of the mid RCA with a 3.5 x 32 mm Synergy drug-eluting stent postdilated up to 3.75 mm.  3. 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  4. Moderate CAD of the LAD.  5. Elevated LVEDP.  6. No evidence of aortic stenosis. ICD 10 Codes: Non ST elevation (NSTEMI) myocardial infarction-I21.4  CPT Codes: Left Heart Cath (visualization of coronaries) and LV-20852; Revasc during AMI stent/angio/atherc,ins asp Thrombectomy, Right Coronary single Vessel (PCI)-36128.RC; OCT Initial Vessel (coronary native vessel or graft) during diagnostic evaluation and/or therapeutic intervention, initial vessel-03222   [id number] Chai Solomon MD Performing Physician Electronically signed by [id number] Chai Solomon MD on 2/24/2025 at 5:48:12 PM  ** Final **     ECG 12 Lead    Result Date: 2/24/2025  Sinus bradycardia with sinus arrhythmia ST & T wave abnormality, consider inferior ischemia Abnormal ECG Confirmed by Francisco De La Torre ([id number]) on 2/24/2025 9:50:54 AM    Electrocardiogram, 12-lead PRN ACS symptoms    Result Date: 2/24/2025  Sinus bradycardia ST & T wave abnormality, consider inferior ischemia Abnormal ECG Confirmed by Francisco De La Torre ([id number]) on 2/24/2025 9:50:10 AM    ECG 12 lead    Result Date: 2/24/2025  Sinus bradycardia with sinus arrhythmia T wave abnormality, consider inferolateral ischemia Abnormal ECG When compared with ECG of 23-MAY-2024 22:08, Questionable change in QRS axis T wave inversion now evident in Inferior leads T wave inversion now evident in Lateral leads    Transthoracic Echo (TTE) Complete    Result Date: 2/22/2025   [facility], [address] [place] [address]              Tel [phone] and Fax [phone] TRANSTHORACIC ECHOCARDIOGRAM REPORT  Patient Name:       FRANCISCO ELHAMCAMDEN Hager Physician:    [id number] Ander Abraham DO Study Date:         2/22/2025           Ordering Provider:    [id number] BEATRIS BEAR MRN/PID:            [id number]            Fellow: Accession#:         [id number]        Nurse: Date of Birth/Age:  1945 / 79 years Sonographer:          Abraham Knowles                                                               PRISCILLA Gender assigned at  M                   Additional Staff: Birth: Height:             173.00 cm           Admit Date: Weight:             71.70 kg            Admission Status:     Inpatient -                                                               Routine BSA / BMI:          1.85 m2 / 23.96     Encounter#:            [id number]                     kg/m2 Blood Pressure:     144/81 mmHg         Department Location:  [facility] Non                                                               Invasive Study Type:    TRANSTHORACIC ECHO (TTE) COMPLETE Diagnosis/ICD: Non ST elevation (NSTEMI) myocardial infarction-I21.4 Indication:    Acute Coronary Syndrome: Non-STEMI CPT Code:      Echo Complete w Full Doppler-[id number] Patient History: Pertinent History: Hyperlipidemia, Dyspnea and Chest Pain. Study Detail: The following Echo studies were performed: 2D, M-Mode, Doppler and               color flow.  PHYSICIAN INTERPRETATION: Left Ventricle: The left ventricular systolic function is normal, with a visually estimated ejection fraction of 55-60%. There are no regional left ventricular wall motion abnormalities. The left ventricular cavity size is normal. There is mildly increased septal and normal posterior left ventricular wall thickness. There is left ventricular concentric remodeling. Spectral Doppler shows a Grade I (impaired relaxation pattern) of left ventricular diastolic filling with normal left atrial filling pressure. Left Atrium: The left atrial size is normal. Right Ventricle: The right ventricle is normal in size. There is normal right ventricular global systolic function. Right Atrium: The right atrium is normal in size. Aortic Valve: The aortic valve is trileaflet. There is mild aortic valve thickening. There is mild aortic valve regurgitation. The peak instantaneous gradient of the aortic valve is 8 mmHg. Mitral Valve: The mitral valve is mildly thickened. There is mild mitral annular calcification. The peak instantaneous gradient of the mitral valve is 3 mmHg. There is trace mitral valve regurgitation. Tricuspid Valve: The tricuspid valve is structurally normal. There is trace tricuspid regurgitation. The Doppler estimated RVSP is within normal limits at 26.6 mmHg. Pulmonic Valve: The pulmonic valve is structurally  normal. There is trace pulmonic valve regurgitation. Pericardium: There is no pericardial effusion noted. Aorta: The aortic root is normal. Systemic Veins: The inferior vena cava appears normal in size. In comparison to the previous echocardiogram(s): There are no prior studies on this patient for comparison purposes.  CONCLUSIONS:  1. The left ventricular systolic function is normal, with a visually estimated ejection fraction of 55-60%.  2. Spectral Doppler shows a Grade I (impaired relaxation pattern) of left ventricular diastolic filling with normal left atrial filling pressure.  3. Right ventricular systolic pressure is within normal limits.  4. Mild aortic valve regurgitation. QUANTITATIVE DATA SUMMARY:  2D MEASUREMENTS:          Normal Ranges: LAs:             3.90 cm  (2.7-4.0cm) IVSd:            1.08 cm  (0.6-1.1cm) LVPWd:           0.96 cm  (0.6-1.1cm) LVIDd:           3.65 cm  (3.9-5.9cm) LVIDs:           2.58 cm LV Mass Index:   61 g/m2 LVEDV Index:     46 ml/m2 LV % FS          29.4 %  LEFT ATRIUM:                  Normal Ranges: LA Vol A4C:        33.7 ml    (22+/-6mL/m2) LA Vol A2C:        54.0 ml LA Vol BP:         46.6 ml LA Vol Index A4C:  18.2 ml/m2 LA Vol Index A2C:  29.2 ml/m2 LA Vol Index BP:   25.2 ml/m2 LA Area A4C:       13.5 cm2 LA Area A2C:       18.7 cm2 LA Major Axis A4C: 4.6 cm LA Major Axis A2C: 5.5 cm LA Volume Index:   25.2 ml/m2 LA Vol A4C:        30.9 ml LA Vol A2C:        51.0 ml LA Vol Index BSA:  22.1 ml/m2  RIGHT ATRIUM:                 Normal Ranges: RA Vol A4C:        74.2 ml    (8.3-19.5ml) RA Vol Index A4C:  40.1 ml/m2 RA Area A4C:       22.3 cm2 RA Major Axis A4C: 5.7 cm  M-MODE MEASUREMENTS:         Normal Ranges: LAs:                 4.14 cm (2.7-4.0cm)  LV SYSTOLIC FUNCTION:                      Normal Ranges: EF-A4C View:    52 % (>=55%) EF-A2C View:    58 % EF-Biplane:     54 % EF-Visual:      58 % LV EF Reported: 58 %  LV DIASTOLIC FUNCTION:             Normal  Ranges: MV Peak E:             0.66 m/s    (0.7-1.2 m/s) MV Peak A:             0.79 m/s    (0.42-0.7 m/s) E/A Ratio:             0.83        (1.0-2.2) MV A Dur:              161.75 msec PulmV Sys Peter:         44.69 cm/s PulmV Velasquez Peter:        42.81 cm/s PulmV S/D Peter:         1.04 PulmV A Revs Peter:      19.67 cm/s PulmV A Revs Dur:      180.78 msec  MITRAL VALVE:          Normal Ranges: MV Vmax:      0.91 m/s (<=1.3m/s) MV peak PG:   3.3 mmHg (<5mmHg) MV mean P.2 mmHg (<2mmHg) MV VTI:       37.13 cm (10-13cm) MV DT:        259 msec (150-240msec)  AORTIC VALVE:           Normal Ranges: AoV Vmax:      1.37 m/s (<=1.7m/s) AoV Peak P.5 mmHg (<20mmHg) LVOT Max Peter:  1.33 m/s (<=1.1m/s) LVOT VTI:      24.35 cm LVOT Diameter: 2.13 cm  (1.8-2.4cm) AoV Area,Vmax: 3.46 cm2 (2.5-4.5cm2)  RIGHT VENTRICLE: RV Basal 4.00 cm RV Mid   3.60 cm RV Major 7.0 cm RV s'    0.13 m/s  TRICUSPID VALVE/RVSP:          Normal Ranges: Peak TR Velocity:     2.43 m/s RV Syst Pressure:     27 mmHg  (< 30mmHg)  PULMONIC VALVE:          Normal Ranges: PV Accel Time:  95 msec  (>120ms) PV Max Peter:     1.1 m/s  (0.6-0.9m/s) PV Max P.9 mmHg  PULMONARY VEINS: PulmV A Revs Dur: 180.78 msec PulmV A Revs Peter: 19.67 cm/s PulmV Velasquez Peter:   42.81 cm/s PulmV S/D Peter:    1.04 PulmV Sys Peter:    44.69 cm/s  AORTA: Asc Ao Diam 3.54 cm  [id number] Ander Ulatowski DO Electronically signed on 2025 at 9:12:40 PM  ** Final **     XR chest 2 views    Result Date: 2025  Interpreted By:  Mikal Sauer, STUDY: XR CHEST 2 VIEWS;  2025 10:46 am   INDICATION: Signs/Symptoms:chest pain.     COMPARISON: 2024   ACCESSION NUMBER(S): [id number]   ORDERING CLINICIAN: IRIS MORELAND   FINDINGS:         CARDIOMEDIASTINAL SILHOUETTE: Cardiomediastinal silhouette is normal in size and configuration.   LUNGS: There is minimal atelectasis at the bases. There is no consolidation or effusion   ABDOMEN: No remarkable upper abdominal findings.    BONES: No acute osseous changes.       1. Bibasilar atelectatic changes. No consolidation seen       MACRO: None   Signed by: Mikal Sauer 2/22/2025 10:57 AM Dictation workstation:   [id number]             Assessment/Plan   Assessment & Plan  NSTEMI (non-ST elevated myocardial infarction) (Multi)  79-year-old gentleman with history of hypertension, hypothyroidism, BPH, paroxysmal atrial fibrillation, Raynaud's phenomenon and previous history of gout presents with midsternal chest discomfort radiating across his chest and neck and left arm with intermittent esophageal reflux for last about 8 to 10 days worse over past 3 to 4 days and therefore he presented to the emergency room.  EKG revealed inferolateral subendocardial ischemia and his serum troponin level elevated at 637 653 and 1013 all these findings consistent with NSTEMI.    1.  NSTEMI had 2 or 3 episodes of brief chest discomfort this morning, relieved with nitroglycerin.  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  Troponins are 637 653 and 1013 and 1266  Cardiology on board.  Patient had left heart catheterization on Monday, February 24, 2025.  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    2.  History of paroxysmal atrial fibrillation, currently in sinus rhythm with sinus bradycardia    3.  Hypertension, blood pressures are stable and acceptable    4.  History of BPH, patient is on tamsulosin which is being held    5.  Hypothyroidism, continue with levothyroxine    6.  Hyperlipidemia with LDL of 100 and HDL of 39 and total cholesterol of 187, patient was taking Crestor 5 mg daily which has been increased to 20 mg daily.    Reviewed EKG echocardiogram all labs and imaging studies and discussed the plan of  treatment with patient. Cardiology recommendation appreciated.       I spent 35 minutes in the professional and overall care of this patient.      Flynn Castillo MD

## 2025-02-25 NOTE — PROGRESS NOTES
Care Coordinator Note:    Plan: Patient in with NSTEMI. S/p heart cath with PCI. On aspirin and brilinta. Patient SOB today while resting. CXR done - pending any other treatment needs then dc home.   Status:inpatient  Payor: [employer]  Disposition: Home no needs with wife.   Barrier: Cardio clearance  ADOD: today  Family at bedside will be ride at dc.     Maria C Torres [facility]      02/25/25 1301   Discharge Planning   Expected Discharge Disposition Home   Intensity of Service   Intensity of Service 0-30 min

## 2025-02-25 NOTE — PROGRESS NOTES
"Francisco Lizama is a 79 y.o. male on day 3 of admission presenting with NSTEMI (non-ST elevated myocardial infarction) (Multi).    Subjective   Patient seen earlier in the day, he said that he had an episode of shortness of breath this morning while he was in bed which is worse when he lays down.  Was seen by cardiology earlier and had a chest x-ray to me chest x-ray appears normal without any pleural effusion or pulmonary infiltrate or pulmonary venous congestion.  He does have mild atelectasis of the left lower lobe, no acute findings.  Patient has no chest pain since his coronary angioplasty and PCI and stent placement other day.  Clinically there is no evidence of heart failure.       Objective     Physical Exam  GENERAL:  Alert, no distress, cooperative  SKIN:  Skin color, texture, turgor normal. No rashes or lesions.  OROPHARYNX:  Lips, mucosa, and tongue are normal.Teeth and gums, normal. Oropharynx normal.  NECK:  No jugulovenous distention, No carotid bruits, Carotid pulse normal contour, Supple  LUNGS:  Lungs clear to auscultation. Good diaphragmatic excursion.  CARDIAC: Rate and rhythm is regular, normal S1 and S2; no rubs, murmurs, or gallops  ABDOMEN:  Abdomen soft, non-tender, BS normal, No masses or organomegaly  EXTREMETIES:  Extremities normal, no deformities, edema, clubbing or skin discoloration. Good capillary refill., No ulcers  NEURO:  Alert, oriented X 3,  Non-focal. Reflexes normal and symmetric. Sensation grossly intact., Cranial nerves II-XII intact  PULSES:  2+ radial, 2+ carotid    Last Recorded Vitals  Blood pressure 129/80, pulse 72, temperature 36.4 °C (97.6 °F), temperature source Temporal, resp. rate 16, height 1.727 m (5' 7.99\"), weight 71.7 kg (158 lb 1.1 oz), SpO2 96%.  Intake/Output last 3 Shifts:  I/O last 3 completed shifts:  In: 235.4 (3.3 mL/kg) [I.V.:235.4 (3.3 mL/kg)]  Out: 0 (0 mL/kg)   Weight: 71.7 kg     Relevant Results         Assessment/Plan   Assessment & Plan  NSTEMI " (non-ST elevated myocardial infarction) (Multi)  79-year-old gentleman with history of hypertension, hypothyroidism, BPH, paroxysmal atrial fibrillation, Raynaud's phenomenon and previous history of gout presents with midsternal chest discomfort radiating across his chest and neck and left arm with intermittent esophageal reflux for last about 8 to 10 days worse over past 3 to 4 days and therefore he presented to the emergency room.  EKG revealed inferolateral subendocardial ischemia and his serum troponin level elevated at 637 653 and 1013 all these findings consistent with NSTEMI.    1.  NSTEMI had 2 or 3 episodes of brief chest discomfort this morning, relieved with nitroglycerin.  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  Troponins are 637 653 and 1013 and 1266  Cardiology on board.  Patient had left heart catheterization on Monday, February 24, 2025.  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  Had an episode of shortness of breath this morning, chest x-ray is clear and clinically no evidence to support the diagnosis of heart failure.    2.  History of paroxysmal atrial fibrillation, currently in sinus rhythm with sinus bradycardia    3.  Hypertension, blood pressures are stable and acceptable    4.  History of BPH, patient is on tamsulosin which is being held    5.  Hypothyroidism, continue with levothyroxine    6.  Hyperlipidemia with LDL of 100 and HDL of 39 and total cholesterol of 187, patient was taking Crestor 5 mg daily which has been increased to 20 mg daily.    Reviewed EKG echocardiogram all labs and imaging studies and discussed the plan of treatment with patient. Cardiology recommendation appreciated.  Hemodynamically stable, could be discharged home  later today if okay with cardiology.  Patient and his daughter was in the room is updated.     I spent 35 minutes in the professional and overall care of this patient.      Flynn Castillo MD

## 2025-02-25 NOTE — DOCUMENTATION CLARIFICATION NOTE
PATIENT:               IRIS FIGUEROA  ACCT #:                  [id number]  MRN:                       [id number]  :                       1945  ADMIT DATE:       2025 9:54 AM  DISCH DATE:        2025 4:53 PM  RESPONDING PROVIDER #:        [id number]          PROVIDER RESPONSE TEXT:    Abnormal platelet counts not requiring treatment or evaluation    CDI QUERY TEXT:    Clarification        Instruction:    Based on your assessment of the patient and the clinical information, please provide the requested documentation by clicking on the appropriate radio button and enter any additional information if prompted.    Question: Is there a diagnosis indicative of the lab values    When answering this query, please exercise your independent professional judgment. The fact that a question is being asked, does not imply that any particular answer is desired or expected.    The patient's clinical indicators include:  Clinical Information: Admitted with NSTEMI 2/2 CAD. S/P TORIE x 1. Labs reviewed with abnormal finding    Clinical Indicators:    -Trended platelets from 25-25: 129,143,145    Treatment: monitoring    Risk Factors: Heparin-25-25, Brilinta-25-25  Options provided:  -- Thrombocytopenia is clinically significant and required treatment/monitoring  -- Abnormal platelet counts not requiring treatment or evaluation  -- Other - I will add my own diagnosis  -- Refer to Clinical Documentation Reviewer    Query created by: Viv Parrish on 2025 4:49 PM      Electronically signed by:  SABINE KUMAR MD 2025 5:53 PM

## 2025-02-25 NOTE — CARE PLAN
Problem: Pain  Goal: Takes deep breaths with improved pain control throughout the shift  Outcome: Progressing  Goal: Turns in bed with improved pain control throughout the shift  Outcome: Progressing  Goal: Walks with improved pain control throughout the shift  Outcome: Progressing  Goal: Performs ADL's with improved pain control throughout shift  Outcome: Progressing  Goal: Participates in PT with improved pain control throughout the shift  Outcome: Progressing  Goal: Free from opioid side effects throughout the shift  Outcome: Progressing  Goal: Free from acute confusion related to pain meds throughout the shift  Outcome: Progressing     Problem: ACS/CP/NSTEMI/STEMI  Goal: Chest pain managed (free from pain or at acceptable level)  Outcome: Progressing  Goal: Lab values return to normal range  Outcome: Progressing  Goal: Promote self management  Outcome: Progressing  Goal: Serial ECG will return to baseline  Outcome: Progressing  Goal: Verbalize understanding of procedures/devices  Outcome: Progressing  Goal: Wean vasopressors/achieve hemodynamic stability  Outcome: Progressing     Problem: Arrythmia/Dysrhythmia  Goal: Lab values return to normal range  Outcome: Progressing  Goal: No evidence of post procedure complications  Outcome: Progressing  Goal: Promote self management  Outcome: Progressing  Goal: Serial ECG will return to baseline  Outcome: Progressing  Goal: Verbalize understanding of procedures/devices  Outcome: Progressing  Goal: Vital signs return to baseline  Outcome: Progressing  Goal: Care and maintenance of device (specify)  Outcome: Progressing     Problem: Cardiac catheterization  Goal: Free from dysrhythmias  Outcome: Progressing  Goal: Free from pain  Outcome: Progressing  Goal: No evidence of post procedure complications  Outcome: Progressing  Goal: Promote self management  Outcome: Progressing  Goal: Verbalize understanding of procedure  Outcome: Progressing  Goal: Care and maintenance of  device (specify)  Outcome: Progressing     Problem: Hypertensive Emergency/Crisis  Goal: Blood pressure gradually reduced to goal range  Outcome: Progressing  Goal: Free from signs of organ damage  Outcome: Progressing  Goal: Lab values return to normal range  Outcome: Progressing  Goal: Promote self management  Outcome: Progressing   The patient's goals for the shift include      The clinical goals for the shift include pt. will remain HDS    Over the shift, the patient did not make progress toward the following goals. Barriers to progression include . Recommendations to address these barriers include .

## 2025-02-25 NOTE — PROGRESS NOTES
"Subjective Data:  Patient reports that he noted this morning orthopnea, felt better sitting upright  Denies chest pain or palpitations  Noted Elevated LVEDP on post cath note  Right wrist cath site intact, no bleeding or hematoma noted; RP intact      Overnight Events:    None reported     Objective Data:  Last Recorded Vitals:  Vitals:    25 2200 25 0000 25 0400 25 0759   BP: 111/64 112/63 111/69 131/83   BP Location:  Left arm Left arm Left arm   Patient Position:  Lying Lying Lying   Pulse:    61   Resp:  17 18 18   Temp:  36.8 °C (98.3 °F) 36.8 °C (98.3 °F) 36.1 °C (97 °F)   TempSrc:  Oral Oral Temporal   SpO2:  94% 95% 100%   Weight:       Height:         Medical Gas Therapy: None (Room air)  Weight  Av kg (158 lb 11 oz)  Min: 71.7 kg (158 lb)  Max: 72.6 kg (160 lb)    LABS:  CMP:  Results from last 7 days   Lab Units 25  0546 25  0629 25  1047   SODIUM mmol/L 137 139 138   POTASSIUM mmol/L 4.6 4.3 4.5   CHLORIDE mmol/L 107 105 106   CO2 mmol/L 23 27 26   ANION GAP mmol/L 12 11 11   BUN mg/dL 13 17 19   CREATININE mg/dL 0.97 0.99 0.97   EGFR mL/min/1.73m*2 80 78 80   ALBUMIN g/dL  --  4.2 4.5   ALT U/L  --  19 17   AST U/L  --  23 26   BILIRUBIN TOTAL mg/dL  --  1.3* 1.3*   LIPASE U/L  --   --  11     CBC:  Results from last 7 days   Lab Units 25  0546 25  0629 25  1047   WBC AUTO x10*3/uL 7.6 7.3 7.6   HEMOGLOBIN g/dL 14.6 14.1 16.2   HEMATOCRIT % 42.2 41.4 46.6   PLATELETS AUTO x10*3/uL 145* 143* 129*   MCV fL 88 89 87     COAG:     ABO: No results found for: \"ABO\"  HEME/ENDO:  Results from last 7 days   Lab Units 25  1148   TSH mIU/L 3.35      CARDIAC:   Results from last 7 days   Lab Units 25  0629 25  1556 25  0629 25  1823 25  1148 25  1047   TROPHS ng/L 1,266* 676* 821* 1,013* 637* 653*      Results from last 7 days   Lab Units 25  1148   LDL CALC mg/dL 100*   VLDL mg/dL 48*   CHOLESTEROL/HDL " RATIO  4.8        Last I/O:    Intake/Output Summary (Last 24 hours) at 2/25/2025 1054  Last data filed at 2/24/2025 1302  Gross per 24 hour   Intake --   Output 0 ml   Net 0 ml     Net IO Since Admission: 561.8 mL [02/25/25 1054]         Inpatient Medications:  Scheduled medications   Medication Dose Route Frequency    aspirin  81 mg oral Daily    enoxaparin  40 mg subcutaneous q24h    furosemide  40 mg intravenous Once    levothyroxine  50 mcg oral Daily    pantoprazole  40 mg oral Daily before breakfast    polyethylene glycol  17 g oral Daily    rosuvastatin  40 mg oral Nightly    tamsulosin  0.4 mg oral BID    ticagrelor  90 mg oral BID     PRN medications   Medication    acetaminophen    Or    acetaminophen    Or    acetaminophen    nitroglycerin    ondansetron    Or    ondansetron    oxygen     Continuous Medications   Medication Dose Last Rate       Physical Exam:  General:  Pleasant male, alert and oriented, NAD   HEENT:  Pupils equal and reactive.  Normocephalic.  Moist mucosa.    Neck:  No thyromegaly.  Normal Jugular Venous Pressure.  Cardiovascular:  Regular rate and rhythm.  Normal S1 and S2.  Pulmonary:  Clear to auscultation bilaterally.  Abdomen:  Soft. Non-tender.   Non-distended.  Positive bowel sounds.  Lower Extremities:  2+ pedal pulses. No LE edema.  Neurologic:  Cranial nerves intact.  No focal deficit.   Skin: Skin warm and dry, normal skin turgor.   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  Moderate LAD disease.  Elevated LVEDP.  No evidence of aortic stenosis.  Successful OCT guided PCI of the mid RCA with a 3.5 x 32mm Synergy TORIE postdilated to 3.75mm.       Assessment/Plan   Mr. Francisco Lizama  is a 79 y.o. male with history of bradycardia, dyslipidemia, trivial CAD on catheterization 2020, gout.     I reviewed telemetry, SR     #NSTEMI  S/p PCI with TORIE to RCA, remain disease Mod LAD disease  Will plan  for staged PCI to LAD within the next few weeks>decision will be made outpt  C/w aspirin, Rosuvastatin and Brillinta as ordered   No beta-blocker due to longstanding chronic bradycardia  Echocardiogram with normal LVEF and no regional wall motion abnormalities    # Probable Acute Diastolic HF VS Brilinta reaction  - noted orthopnea this morning> new symptom, appears euvolemic peripherally  - BNP and CXR pend; trial dose IV lasix 40 >monitor responseMy Note    #Dyslipidemia  C/w Rosuvastatin at increased dose as ordered      Recommendations:  Hold discharge until this evening from a cardiac perspective d/t new c/o dyspnea  Trial Lasix> will monitor response, repeat CXR pend  C/w CV meds as ordered  Cardiology follow up with Dr. Cole within 1-2 weeks post discharge    Addendum:  CXR clear, BNP increased 283  He reports robust urinary response to diuretic dose, with minimal improvement in breathing  Suspect dyspnea is partly r/t Brilinta. Since symptoms are mild, will continue Brilinta for the next month. If no symptom improvement will plan to switch to prasugrel.   C/w remain CV meds as ordered, rosuvastatin, aspirin> all being delivered via meds to bed before discharge  Discussed with patient and updated outpt Cards Dr. Calix  No cardiac barriers to discharge  Cardiology will sign off  Please call with questions        Code Status:  Full Code    I spent 45 minutes in the professional and overall care of this patient.        Diana Torres, CHERRY-CNP

## 2025-02-25 NOTE — ASSESSMENT & PLAN NOTE
79-year-old gentleman with history of hypertension, hypothyroidism, BPH, paroxysmal atrial fibrillation, Raynaud's phenomenon and previous history of gout presents with midsternal chest discomfort radiating across his chest and neck and left arm with intermittent esophageal reflux for last about 8 to 10 days worse over past 3 to 4 days and therefore he presented to the emergency room.  EKG revealed inferolateral subendocardial ischemia and his serum troponin level elevated at 637 653 and 1013 all these findings consistent with NSTEMI.    1.  NSTEMI had 2 or 3 episodes of brief chest discomfort this morning, relieved with nitroglycerin.  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  Troponins are 637 653 and 1013 and 1266  Cardiology on board.  Patient had left heart catheterization on Monday, February 24, 2025.  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    2.  History of paroxysmal atrial fibrillation, currently in sinus rhythm with sinus bradycardia    3.  Hypertension, blood pressures are stable and acceptable    4.  History of BPH, patient is on tamsulosin which is being held    5.  Hypothyroidism, continue with levothyroxine    6.  Hyperlipidemia with LDL of 100 and HDL of 39 and total cholesterol of 187, patient was taking Crestor 5 mg daily which has been increased to 20 mg daily.    Reviewed EKG echocardiogram all labs and imaging studies and discussed the plan of treatment with patient. Cardiology recommendation appreciated.

## 2025-02-26 DIAGNOSIS — I21.4 NSTEMI (NON-ST ELEVATED MYOCARDIAL INFARCTION) (MULTI): Primary | ICD-10-CM

## 2025-02-28 PROBLEM — M10.9 GOUT: Status: ACTIVE | Noted: 2025-02-28

## 2025-02-28 LAB
ATRIAL RATE: 57 BPM
P AXIS: 66 DEGREES
P OFFSET: 177 MS
P ONSET: 119 MS
PR INTERVAL: 206 MS
Q ONSET: 222 MS
QRS COUNT: 10 BEATS
QRS DURATION: 86 MS
QT INTERVAL: 428 MS
QTC CALCULATION(BAZETT): 416 MS
QTC FREDERICIA: 420 MS
R AXIS: 61 DEGREES
T AXIS: -54 DEGREES
T OFFSET: 436 MS
VENTRICULAR RATE: 57 BPM

## 2025-03-03 ENCOUNTER — OFFICE VISIT (OUTPATIENT)
Dept: CARDIOLOGY | Facility: CLINIC | Age: 80
End: 2025-03-03
Payer: MEDICARE

## 2025-03-03 VITALS
WEIGHT: 160 LBS | OXYGEN SATURATION: 95 % | BODY MASS INDEX: 24.25 KG/M2 | DIASTOLIC BLOOD PRESSURE: 75 MMHG | HEIGHT: 68 IN | SYSTOLIC BLOOD PRESSURE: 132 MMHG | HEART RATE: 59 BPM

## 2025-03-03 DIAGNOSIS — I25.10 CORONARY ARTERY DISEASE INVOLVING NATIVE CORONARY ARTERY OF NATIVE HEART, UNSPECIFIED WHETHER ANGINA PRESENT: Primary | ICD-10-CM

## 2025-03-03 DIAGNOSIS — R06.09 DOE (DYSPNEA ON EXERTION): ICD-10-CM

## 2025-03-03 DIAGNOSIS — I21.4 NSTEMI (NON-ST ELEVATED MYOCARDIAL INFARCTION) (MULTI): ICD-10-CM

## 2025-03-03 PROCEDURE — 1111F DSCHRG MED/CURRENT MED MERGE: CPT | Performed by: STUDENT IN AN ORGANIZED HEALTH CARE EDUCATION/TRAINING PROGRAM

## 2025-03-03 PROCEDURE — 99215 OFFICE O/P EST HI 40 MIN: CPT | Performed by: STUDENT IN AN ORGANIZED HEALTH CARE EDUCATION/TRAINING PROGRAM

## 2025-03-03 PROCEDURE — G2211 COMPLEX E/M VISIT ADD ON: HCPCS | Performed by: STUDENT IN AN ORGANIZED HEALTH CARE EDUCATION/TRAINING PROGRAM

## 2025-03-03 PROCEDURE — 1126F AMNT PAIN NOTED NONE PRSNT: CPT | Performed by: STUDENT IN AN ORGANIZED HEALTH CARE EDUCATION/TRAINING PROGRAM

## 2025-03-03 PROCEDURE — 1159F MED LIST DOCD IN RCRD: CPT | Performed by: STUDENT IN AN ORGANIZED HEALTH CARE EDUCATION/TRAINING PROGRAM

## 2025-03-03 PROCEDURE — 1160F RVW MEDS BY RX/DR IN RCRD: CPT | Performed by: STUDENT IN AN ORGANIZED HEALTH CARE EDUCATION/TRAINING PROGRAM

## 2025-03-03 PROCEDURE — 93005 ELECTROCARDIOGRAM TRACING: CPT | Performed by: STUDENT IN AN ORGANIZED HEALTH CARE EDUCATION/TRAINING PROGRAM

## 2025-03-03 RX ORDER — SPIRONOLACTONE 25 MG/1
25 TABLET ORAL DAILY
Qty: 30 TABLET | Refills: 11 | Status: SHIPPED | OUTPATIENT
Start: 2025-03-03 | End: 2026-03-03

## 2025-03-03 ASSESSMENT — ENCOUNTER SYMPTOMS
DEPRESSION: 0
OCCASIONAL FEELINGS OF UNSTEADINESS: 0
LOSS OF SENSATION IN FEET: 0

## 2025-03-03 ASSESSMENT — PAIN SCALES - GENERAL: PAINLEVEL_OUTOF10: 0-NO PAIN

## 2025-03-03 ASSESSMENT — COLUMBIA-SUICIDE SEVERITY RATING SCALE - C-SSRS
2. HAVE YOU ACTUALLY HAD ANY THOUGHTS OF KILLING YOURSELF?: NO
6. HAVE YOU EVER DONE ANYTHING, STARTED TO DO ANYTHING, OR PREPARED TO DO ANYTHING TO END YOUR LIFE?: NO
1. IN THE PAST MONTH, HAVE YOU WISHED YOU WERE DEAD OR WISHED YOU COULD GO TO SLEEP AND NOT WAKE UP?: NO

## 2025-03-03 NOTE — PROGRESS NOTES
Location of visit: [address]   Type of Visit: Established - Last Seen: 5/29/2024     Chief Complaint:  Patient was referred to Cardiology by Dr. Torres for follow-up of ACS.    History Of Present Illness:    Francisco Lizama is a 79 y.o. male, with history significant for mild CAD, Hypertension, Hyperlipidemia, Hypothyroidism, BPH, who visits Cardiology today as a follow up visit. He was admitted on 2/22/2025 through 2/25/2025 due to NSTEMI with mid RCA disease. There was also 80% disease of a non-dominant mid LCx-OM2. He underwent RCA PTCA with a 3.5x32 mm Synergy TORIE. TTE pre PCI showed normal LVEF and no regional wall motion abnormalities. BNP was mildly elevated. He refers orthopnea and dyspnea on exertion post procedure, not related to Brilinta. No chest pain, palpitations or edema.     Last  post MI.    Patient denies PND, edema, palpitations, dizziness, lightheadedness, syncope, claudication, or snoring/apnea.    Blood pressure: 132/75 mmHg  HR: 59 bpm    Past Medical History:  He has a past medical history of Acquired hypothyroidism, BPH (benign prostatic hyperplasia), Gout, Kidney stone, Mixed hyperlipidemia, Paroxysmal atrial fibrillation (Multi), Raynaud's syndrome without gangrene, and Scrotal mass.    Past Surgical History:  He has a past surgical history that includes Tonsillectomy and Cardiac catheterization (N/A, 2/24/2025).    Social History:  He reports that he has never smoked. He has never used smokeless tobacco. He reports current alcohol use of about 1.0 standard drink of alcohol per week. He reports that he does not use drugs.    Family History:  Family History   Problem Relation Name Age of Onset    Other (lung fibrosis) Mother      Other (CABG) Father      No Known Problems Brother      No Known Problems Daughter       Allergies:  Patient has no known allergies.    Outpatient Medications:  Current Outpatient Medications   Medication Instructions    aspirin 81 mg, oral, Daily     "Brilinta 90 mg, oral, 2 times daily    levothyroxine (SYNTHROID, LEVOXYL) 50 mcg, oral, Every Day    pantoprazole (PROTONIX) 20 mg, oral, 2 times daily, Take before breakfast and before dinner Do not crush, chew, or split.    rosuvastatin (CRESTOR) 40 mg, oral, Nightly    spironolactone (ALDACTONE) 25 mg, oral, Daily    tamsulosin (FLOMAX) 0.4 mg, oral, Daily     Last Recorded Vitals:  Vitals:    03/03/25 1315   BP: 132/75   BP Location: Right arm   Patient Position: Sitting   Pulse: 59   SpO2: 95%   Weight: 72.6 kg (160 lb)   Height: 1.727 m (5' 8\")     Physical Exam:      3/3/2025     1:15 PM 2/25/2025     3:57 PM 2/25/2025    12:26 PM 2/25/2025     7:59 AM 2/25/2025     4:00 AM 2/25/2025    12:00 AM   Vitals   Systolic 132 144 129 131 111 112   Diastolic 75 78 80 83 69 63   BP Location Right arm Left arm Left arm Left arm Left arm Left arm   Heart Rate 59 78 72 61     Temp  36.7 °C (98 °F) 36.4 °C (97.6 °F) 36.1 °C (97 °F) 36.8 °C (98.3 °F) 36.8 °C (98.3 °F)   Resp  16 16 18 18 17   Height 1.727 m (5' 8\")        Weight (lb) 160        BMI 24.33 kg/m2        BSA (m2) 1.87 m2        Visit Report Report          Wt Readings from Last 5 Encounters:   03/03/25 72.6 kg (160 lb)   02/24/25 71.7 kg (158 lb 1.1 oz)   09/24/24 73.5 kg (162 lb)   05/29/24 72.6 kg (160 lb)   05/23/24 72.6 kg (160 lb)     General: Sitting up comfortably in chair; in no apparent distress.  HEENT: Normocephalic; atraumatic. Well hydrated.  Eyes: Anicteric sclera. Extraocular movement intact.  Neck: Supple; no thyromegaly; normal jugular venous pressure, no bruits.  Respiratory: Bilateral air entry equal. No wheezing.  Cardiovascular: Normal S1, S2, +ve S4; 1/4 systolic murmur auscultated.  Abdomen: Nondistended; nontender. (+) bowel sounds.  Extremities: No peripheral edema present. Pulses 2+ diffusely.  Neurological: Oriented to time, place, and person; nonfocal.  Psychiatric: Normal affect.     Last Labs Reviewed:  CBC -  Recent Labs     " 02/25/25  0546 02/24/25  0629 02/22/25  1047 05/23/24 2222 03/20/24  0433   WBC 7.6 7.3 7.6 6.5 5.3   HGB 14.6 14.1 16.2 15.3 14.1   HCT 42.2 41.4 46.6 44.8 42.2   * 143* 129* 178 169   MCV 88 89 87 89 88     CMP -  Recent Labs     02/25/25  0546 02/23/25  0629 02/22/25  1047 10/18/24  1258 05/23/24 2222 03/20/24  0433 03/19/24  1215    139 138 140 138 139  --    K 4.6 4.3 4.5 4.4 4.2 4.4  --     105 106 107 103 105  --    CO2 23 27 26 25 24 27  --    ANIONGAP 12 11 11 12 15 11  --    BUN 13 17 19 16 22 15  --    CREATININE 0.97 0.99 0.97 0.96 1.01 1.02  --    EGFR 80 78 80 81 76 76  --    MG  --   --   --   --   --  2.00 2.20   CALCIUM 9.1 9.1 9.6 9.9 9.5 9.0  --      Recent Labs     02/23/25  0629 02/22/25  1047 05/23/24 2222 03/19/24  1050 11/30/23  0858   ALBUMIN 4.2 4.5 4.5 4.9 4.6   ALKPHOS 60 65 72 70 58   ALT 19 17 23 15 18   AST 23 26 24 18 17   BILITOT 1.3* 1.3* 1.2 1.4* 1.5*   LIPASE  --  11 21  --   --      LIPID PANEL -   Recent Labs     02/22/25  1148 03/20/24  0433 11/30/23  0858 11/25/22  1543 10/05/21  0840 12/13/19  1146   CHOL 187 142 152 174 160 146   LDLF  --   --   --  72 87 77   LDLCALC 100* 75 79  --   --   --    HDL 39.1 37.4 44.0 38.4* 41.3 40.7   TRIG 239* 147 145 318* 160* 142     COAGULATION PANEL -  Recent Labs     02/23/25  0629 02/23/25  0211 02/22/25  2227 02/22/25  1823 03/19/24  1306 01/19/20  1740   INR  --   --   --   --   --  1.0   HAUF 0.4 0.4 0.4   < >  --   --    DDIMERVTE  --   --   --   --  321 <215    < > = values in this interval not displayed.     HEME/ENDO -  Recent Labs     02/22/25  1148 03/19/24  1215 03/19/24  1050 11/30/23  0858 11/25/22  1543 01/19/20  1740 12/13/19  1146 08/24/18  0828   TSH 3.35 3.14  --  2.62 3.05   < > 1.85 1.83   HGBA1C  --   --  5.9*  --   --   --   --   --    FREET4  --   --   --   --   --   --  1.05 0.89    < > = values in this interval not displayed.     CARDIOVASCULAR  Recent Labs     02/25/25  0546 02/24/25  0629  02/23/25  1556 02/23/25  0629 02/22/25  1823 02/22/25  1148 05/23/24  2321 05/23/24  2222 03/19/24  1215 03/19/24  1050 03/19/24  1050 01/20/20  0508 01/19/20  1740   TROPHS  --  1,266* 676* 821* 1,013* 637*   < > 4 4   < > 3  --   --    *  --   --   --   --   --   --  25  --   --  35 19 19   CRP  --   --   --   --   --   --   --   --  0.13  --   --   --   --     < > = values in this interval not displayed.     Last Cardiology/Imaging Tests Personally Reviewed (if images available) and Interpreted:  ECG:  Encounter Date: 02/22/25   Electrocardiogram, 12-lead PRN ACS symptoms   Result Value    Ventricular Rate 50    Atrial Rate 50    NE Interval 200    QRS Duration 90    QT Interval 406    QTC Calculation(Bazett) 370    P Axis 48    R Axis 8    T Axis -46    QRS Count 8    Q Onset 222    P Onset 122    P Offset 178    T Offset 425    QTC Fredericia 382    Narrative    Sinus bradycardia  ST & T wave abnormality, consider inferior ischemia     Echocardiogram:  Transthoracic Echo (TTE) Complete 02/22/2025   1. The left ventricular systolic function is normal, with a visually estimated ejection fraction of 55-60%.   2. Spectral Doppler shows a Grade I (impaired relaxation pattern) of left ventricular diastolic filling with normal left atrial filling pressure.   3. Right ventricular systolic pressure is within normal limits.   4. Mild aortic valve regurgitation.    Cath:  Left Heart Cath with Coronary Angiography and LV 02/24/2025  1. Coronary Lesion Summary:  LAD    50% stenosis    proximal  OM 2   80% stenosis      mid  RCA    99% stenosis      mid  2. Successful OCT guided PCI of the mid RCA with a 3.5 x 32 mm Synergy drug-eluting stent postdilated up to 3.75 mm.  3. 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  4. Moderate CAD of the LAD.  5. Elevated LVEDP.  6. No evidence of aortic stenosis.    Stress Test:  MR cardiac w and wo IV contrast w regadenoson stress for MORPH FUNCT and valve DZ 05/16/2024  1.  Normal LV size and systolic function. Quantitative LVEF 69%.   2. No evidence of inducible myocardial ischemia using regadenoson  stress perfusion imaging.   3. No evidence of myocardial fibrosis, infiltration or infarction based on LGE imaging.   4. Normal RV size and systolic function. Quantitative RVEF 53%.     CV RISK FACTORS:   # Hypertension: Last BP: 132/75.  # Hyperlipidemia: Last Tchol 187 / LDL No results found for requested labs within last 365 days. / HDL 39.1 / TRIG 239 (2/22/2025: 11:48 AM).  # Type II Diabetes Mellitus: Last A1c 5.9 (3/19/2024: 10:50 AM).  # Obesity: Last BMI: 24.33.  # CKD: Last BUN/Cr (GFR): 13/0.97 (80), 2/25/2025:  5:46 AM.    ASCV RISK:  The ASCVD Risk score (Mukul GHOSH, et al., 2019) failed to calculate for the following reasons:    Risk score cannot be calculated because patient has a medical history suggesting prior/existing ASCVD    Assessment:   79 y.o. male, with history significant for mild CAD, Hypertension, Hyperlipidemia, Hypothyroidism, BPH, who visits Cardiology today as a follow up visit post NSTEMI. He reports COVINGTON and orthopnea post NSTEMI. Pre PCI showed normal LVEF and no RWMA, now complaining of COVINGTON and orthopnea, not related to Brilinta.   Assessment & Plan  NSTEMI (non-ST elevated myocardial infarction) (Multi)  - Continue DAPT for 12 months  - High dose Crestor for >30 days, decrease to moderate dose and reassess lipid panel after 1 month to keep LDL <70  - Will need to start ARB in context of ACS after assessing tolerance to Aldactone and confirming LVEF  COVINGTON (dyspnea on exertion)  - Most likely HFpEF in context of ischemic heart disease  - Will start spironolactone 12.5 mg every day  - Limited TTE to assess LVEF, regional wall motion abnormalities and rule out MR in context of systolic murmur  Coronary artery disease involving native coronary artery of native heart, unspecified whether angina present  - OM2 disease on a small vessel (~2 mm) and non dominant  LCx  - Once completed cardiac rehab will assess ischemia with imaging stress to define if PCI has to be discussed    I will contact the patient once the results are available through [employer]  I spent 50 minutes assessing the case between pre-charting, face-to-face patient interaction, and documentation    James Calix MD

## 2025-03-03 NOTE — ASSESSMENT & PLAN NOTE
- OM2 disease on a small vessel (~2 mm) and non dominant LCx  - Once completed cardiac rehab will assess ischemia with imaging stress to define if PCI has to be discussed

## 2025-03-03 NOTE — ASSESSMENT & PLAN NOTE
- Continue DAPT for 12 months  - High dose Crestor for >30 days, decrease to moderate dose and reassess lipid panel after 1 month to keep LDL <70  - Will need to start ARB in context of ACS after assessing tolerance to Aldactone and confirming LVEF

## 2025-03-03 NOTE — ASSESSMENT & PLAN NOTE
- Most likely HFpEF in context of ischemic heart disease  - Will start spironolactone 12.5 mg every day  - Limited TTE to assess LVEF, regional wall motion abnormalities and rule out MR in context of systolic murmur

## 2025-03-05 ENCOUNTER — PATIENT OUTREACH (OUTPATIENT)
Dept: CARE COORDINATION | Facility: CLINIC | Age: 80
End: 2025-03-05
Payer: MEDICARE

## 2025-03-05 NOTE — PROGRESS NOTES
Transitions of care outreach call  and no contact made with patient. Left voicemail message with my name and contact information. Will enroll patient in transtions of care and outreach again to follow up on PCP appointment.     LEONARD Sutherland, RN   [phone]   ACO Department

## 2025-03-06 ENCOUNTER — PATIENT OUTREACH (OUTPATIENT)
Dept: CARE COORDINATION | Facility: CLINIC | Age: 80
End: 2025-03-06

## 2025-03-06 ENCOUNTER — OFFICE VISIT (OUTPATIENT)
Dept: PRIMARY CARE | Facility: CLINIC | Age: 80
End: 2025-03-06
Payer: MEDICARE

## 2025-03-06 VITALS
RESPIRATION RATE: 12 BRPM | OXYGEN SATURATION: 99 % | HEART RATE: 60 BPM | SYSTOLIC BLOOD PRESSURE: 121 MMHG | DIASTOLIC BLOOD PRESSURE: 73 MMHG

## 2025-03-06 DIAGNOSIS — I25.10 ASCVD (ARTERIOSCLEROTIC CARDIOVASCULAR DISEASE): ICD-10-CM

## 2025-03-06 DIAGNOSIS — N40.1 BENIGN PROSTATIC HYPERPLASIA WITH NOCTURIA: ICD-10-CM

## 2025-03-06 DIAGNOSIS — R06.83 SNORING: ICD-10-CM

## 2025-03-06 DIAGNOSIS — G47.10 HYPERSOMNIA, UNSPECIFIED: ICD-10-CM

## 2025-03-06 DIAGNOSIS — R35.1 BENIGN PROSTATIC HYPERPLASIA WITH NOCTURIA: ICD-10-CM

## 2025-03-06 DIAGNOSIS — G47.9 SLEEP DISTURBANCE: Primary | ICD-10-CM

## 2025-03-06 PROCEDURE — G2211 COMPLEX E/M VISIT ADD ON: HCPCS | Performed by: INTERNAL MEDICINE

## 2025-03-06 PROCEDURE — 1111F DSCHRG MED/CURRENT MED MERGE: CPT | Performed by: INTERNAL MEDICINE

## 2025-03-06 PROCEDURE — 99213 OFFICE O/P EST LOW 20 MIN: CPT | Performed by: INTERNAL MEDICINE

## 2025-03-06 NOTE — PROGRESS NOTES
Subjective   Patient ID: Francisco Lizama is a 79 y.o. male who presents for No chief complaint on file..    HPI follow-up status post hospitalization after abrupt chest pain he feels better but still stamina is down and feels breathless especially at night does not sleep well no chest pain no shortness of breath no side effect with medication    Review of Systems    Objective   There were no vitals taken for this visit.    Physical Exam  Vital signs noted alert and oriented x 3 NCAT no JVD or bruit chest clear to auscultation CV regular rate and rhythm S1-S2 without murmur gallop or rub abdomen soft nontender normal active bowel sounds extremities no clubbing cyanosis or edema normal distal pulses  Assessment/Plan        Impression sleep disturbance ASCVD other diagnoses bph  Plan had followed up with cardiology discussed with laboratory results imaging okay for melatonin 5 mg p.o. nightly sleep hygiene okay for home sleep study requisition made and then follow-up on blood pressure cholesterol cardiology and gastroenterology recheck 1 to 4 weeks    Addendum asked whether there he should be taking 1 Flomax or to his brother had been taking IIe felt he should do the same could not really tell much of the difference between 1 or 2 for now we will use the single Flomax given he has adequate stream and his blood pressure is okay and then revisit with urology after his cardiovascular profile is completed  RAC

## 2025-03-07 ENCOUNTER — CLINICAL SUPPORT (OUTPATIENT)
Dept: CARDIAC REHAB | Facility: CLINIC | Age: 80
End: 2025-03-07
Payer: MEDICARE

## 2025-03-07 VITALS
RESPIRATION RATE: 14 BRPM | BODY MASS INDEX: 24.25 KG/M2 | HEART RATE: 63 BPM | SYSTOLIC BLOOD PRESSURE: 126 MMHG | OXYGEN SATURATION: 96 % | DIASTOLIC BLOOD PRESSURE: 66 MMHG | WEIGHT: 160 LBS | HEIGHT: 68 IN

## 2025-03-07 DIAGNOSIS — I21.4 NSTEMI (NON-ST ELEVATED MYOCARDIAL INFARCTION) (MULTI): ICD-10-CM

## 2025-03-07 ASSESSMENT — DUKE ACTIVITY SCORE INDEX (DASI)
DASI METS SCORE: 5.4
TOTAL_SCORE: 21.45
CAN YOU CLIMB A FLIGHT OF STAIRS OR WALK UP A HILL: YES
CAN YOU PARTICIPATE IN MODERATE RECREATIONAL ACTIVITIES LIKE GOLF, BOWLING, DANCING, DOUBLES TENNIS OR THROWING A BASEBALL OR FOOTBALL: NO
CAN YOU RUN A SHORT DISTANCE: NO
CAN YOU DO HEAVY WORK AROUND THE HOUSE LIKE SCRUBBING FLOORS OR LIFTING AND MOVING HEAVY FURNITURE: NO
CAN YOU HAVE SEXUAL RELATIONS: YES
CAN YOU DO LIGHT WORK AROUND THE HOUSE LIKE DUSTING OR WASHING DISHES: YES
CAN YOU WALK A BLOCK OR TWO ON LEVEL GROUND: NO
CAN YOU TAKE CARE OF YOURSELF (EAT, DRESS, BATHE, OR USE TOILET): YES
CAN YOU DO YARD WORK LIKE RAKING LEAVES, WEEDING OR PUSHING A MOWER: NO
CAN YOU PARTICIPATE IN STRENOUS SPORTS LIKE SWIMMING, SINGLES TENNIS, FOOTBALL, BASKETBALL, OR SKIING: NO
CAN YOU WALK INDOORS, SUCH AS AROUND YOUR HOUSE: YES
CAN YOU DO MODERATE WORK AROUND THE HOUSE LIKE VACUUMING, SWEEPING FLOORS OR CARRYING GROCERIES: YES

## 2025-03-07 ASSESSMENT — PAIN SCALES - GENERAL: PAINLEVEL_OUTOF10: 0-NO PAIN

## 2025-03-07 ASSESSMENT — PATIENT HEALTH QUESTIONNAIRE - PHQ9
SUM OF ALL RESPONSES TO PHQ QUESTIONS 1-9: 1
1. LITTLE INTEREST OR PLEASURE IN DOING THINGS: NOT AT ALL
6. FEELING BAD ABOUT YOURSELF - OR THAT YOU ARE A FAILURE OR HAVE LET YOURSELF OR YOUR FAMILY DOWN: NOT AT ALL
7. TROUBLE CONCENTRATING ON THINGS, SUCH AS READING THE NEWSPAPER OR WATCHING TELEVISION: NOT AT ALL
3. TROUBLE FALLING OR STAYING ASLEEP OR SLEEPING TOO MUCH: NOT AT ALL
SUM OF ALL RESPONSES TO PHQ QUESTIONS 1-9: 1
2. FEELING DOWN, DEPRESSED OR HOPELESS: NOT AT ALL
9. THOUGHTS THAT YOU WOULD BE BETTER OFF DEAD, OR OF HURTING YOURSELF: NOT AT ALL
SUM OF ALL RESPONSES TO PHQ9 QUESTIONS 1 & 2: 0
5. POOR APPETITE OR OVEREATING: NOT AT ALL
4. FEELING TIRED OR HAVING LITTLE ENERGY: SEVERAL DAYS
8. MOVING OR SPEAKING SO SLOWLY THAT OTHER PEOPLE COULD HAVE NOTICED. OR THE OPPOSITE, BEING SO FIGETY OR RESTLESS THAT YOU HAVE BEEN MOVING AROUND A LOT MORE THAN USUAL: NOT AT ALL

## 2025-03-07 NOTE — PROGRESS NOTES
Cardiac Rehabilitation Initial Treatment Plan    Name: Francisco Lizama  Medical Record Number: [id number]  YOB: 1945  Age: 79 y.o.    Today’s Date: 3/7/2025  Primary Care Physician: Joel Castaneda MD  Referring Physician: James Calix MD  Program Location: [address]     General  Primary Diagnosis:   1. NSTEMI (non-ST elevated myocardial infarction) (Multi)  Referral to Cardiac Rehab         Onset/Date of Diagnosis: 2/22/2025    Initial Assessment, not yet started program.    AACVPR Risk Stratification: Moderate     Falls Risk: Low  Psychosocial Assessment     Pre PHQ-9: 1      Sent PH-Q 9 to MD if score > 20: No; score < 20    Pt reported/currently experiencing stress: No  Patient uses stress management skills: No   History of: no history of anxiety or depression  Currently seeing a mental health provider: No  Social Support: Yes, Whom:wife  Quality of Life Survey: SF-36   SF-36 Pre Post   Physical Component Score  TBD   Mental Component Score  TBD     Learning Assessment:  Learning assessment/barriers: None  Preferred learning method: Visual and hands on  Barriers: None  Comments:    Stages of Change:Preparation    Psychosocial Plan    Goal Status: Initial Assessment; goals not yet started    Psychosocial Goals: Maintain or lower PH-Q 9 score by discharge    Psychosocial Interventions/Education: To be done in Cardiac Rehab.    Initial Assessment: not yet started    Nutrition Assessment:    Hyperlipidemia: Yes     Lipids:   Lab Results   Component Value Date    CHOL 187 02/22/2025    HDL 39.1 02/22/2025    LDLF 72 11/25/2022    TRIG 239 (H) 02/22/2025       Current Dietary Guidelines: Low fat, Low sodium  Barriers to dietary change: no    Diet Habit Survey: Picture Your Plate  Pre:  70  Post: To be done at discharge.    Diabetes Assessment    Lab Results   Component Value Date    HGBA1C 5.9 (H) 03/19/2024       History of Diabetes: No    Weight Management    Height: 68 inches  Weight:  160 lbs  BMI: 24.33      Nutrition Plan    Goal Status: Initial Assessment; goals not yet started    Nutrition Goals: Lipid Goal: HDL>45, LDL <70, Total <180, Trigs <150, Improve Diet Habit Survey score by 5-10 points by discharge, Adapt a Mediterranean focused diet prior to discharge, and Learn how to read and interpret nutrition labels prior to discharge    Nutrition Interventions/Education:   To be done in Cardiac Rehab.    Initial Assessment: not yet started    Exercise Assessment    Home Exercise: No  Mode: TM, stationary Bike, Rower  Frequency: NA  Duration: NA    Exercise Prescription     Exercise Prescription based on: Duke Activity Status Index (DASI)    DASI Score: 21.45   MET Score: 5.4   Frequency:  3 days/week   Mode: Treadmill, NuStep, and Recumbent Cycle   Duration: 27 total aerobic minutes   Intensity: RPE 12-16  Target HR:  To be calculated after 6 attended sessions.  MET Level: 2.8  Patient wears supplemental O2: No     Modality Workload METs Duration (minutes)   1 Pre-Exercise      2 Treadmill 2.2 mph  2.7 9 :00   3 NuStep 48  Lubin @ Load 3 2.5 9 :00   4 Recumbent Bike Load 3 @ 55 rpm  2.8 9 :00   5 [place]    2 :00   6 Post-Exercise        Resistance Training: No   Home Exercise Prescription given: To be given prior to discharge from program.    Exercise Plan    Goal Status: Initial Assessment; goals not yet started    Exercise Goals: Increase exercise MET level by 5-10% each week, Increase total exercise duration to 30-45 minutes, Obtain 150 minutes/week of moderate intensity aerobic exercise, and Establish a home exercise program before discharge    Exercise Interventions/Education:   To be done in Cardiac Rehab.    Initial Assessment: not yet started    Other Core Components/Risk Factor Assessment:    Medication adherence  Current Medications:   Medication Documentation Review Audit       Reviewed by Mahesh Rosas RN (Registered Nurse) on 03/07/25 at 1426      Medication Order Taking?  Sig Documenting Provider Last Dose Status   aspirin 81 mg chewable tablet [id number] Yes Chew 1 tablet (81 mg) once daily. PARTH Pozo  Active    Patient not taking:   Discontinued 03/03/25 1330     Discontinued 03/03/25 1329   levothyroxine (Synthroid, Levoxyl) 50 mcg tablet [id number] Yes Take 1 tablet (50 mcg) by mouth once every day. Tommy Corcoran MD  Active   pantoprazole (Protonix) 20 mg EC tablet [id number] Yes Take 1 tablet (20 mg) by mouth 2 times a day. Take before breakfast and before dinner Do not crush, chew, or split.   Patient taking differently: Take 1 tablet (20 mg) by mouth once daily. Take before breakfast and before dinner Do not crush, chew, or split.    Joel Castaneda MD  Active    Patient not taking:   Discontinued 03/03/25 1329   rosuvastatin (Crestor) 40 mg tablet [id number] Yes Take 1 tablet (40 mg) by mouth once daily at bedtime. PARTH Barajas, TROY  Active    Patient not taking:   Discontinued 03/03/25 1329   spironolactone (Aldactone) 25 mg tablet [id number] Yes Take 1 tablet (25 mg) by mouth once daily. James Calix MD  Active   tamsulosin (Flomax) 0.4 mg 24 hr capsule [id number] Yes Take 1 capsule (0.4 mg) by mouth once daily. Tommy Corcoran MD  Active   ticagrelor (Brilinta) 90 mg tablet [id number] Yes Take 1 tablet (90 mg) by mouth 2 times a day. PARTH Barajas, TROY  Active                                 Medication compliance: Yes   Uses pill box/organizer: Yes    Carries medication list: Yes MyChart    Blood Pressure Management  History of Hypertension: No   Medication Changes: No   Resting BP:  Visit Vitals  /66 (BP Location: Left arm, Patient Position: Sitting, BP Cuff Size: Small adult)   Pulse 63   Resp 14        Heart Failure Management  Hx of Heart Failure: No    Smoking/Tobacco Assessment  Social History     Tobacco Use   Smoking Status Never   Smokeless Tobacco Never       Other Core Component Plan    Goal Status: Initial  Assessment; goals not yet started    Other Core Component Goals: Verbalize medication usage and drug actions by discharge and Achieve resting BP of < 130/80 by discharge    Other Core Component Interventions/Education:   To be done in Cardiac Rehab.    Initial Assessment: not yet started    Individual Patient Goals:    Get back to doing Ld chi by midpoint.  Get back to volunteering at [facility] by midpoint.    Goal Status: Initial Assessment; goals not yet started    Staff Comments:      Rehab Staff Signature: Mahesh Rosas RN

## 2025-03-10 DIAGNOSIS — R39.12 WEAK URINARY STREAM: ICD-10-CM

## 2025-03-10 DIAGNOSIS — E03.9 HYPOTHYROIDISM, UNSPECIFIED TYPE: ICD-10-CM

## 2025-03-12 DIAGNOSIS — I21.4 ACUTE MYOCARDIAL INFARCTION, SUBENDOCARDIAL INFARCTION, INITIAL EPISODE OF CARE (MULTI): Primary | ICD-10-CM

## 2025-03-13 ENCOUNTER — HOSPITAL ENCOUNTER (OUTPATIENT)
Dept: CARDIOLOGY | Facility: CLINIC | Age: 80
Discharge: HOME | End: 2025-03-13
Payer: MEDICARE

## 2025-03-13 PROCEDURE — 93308 TTE F-UP OR LMTD: CPT

## 2025-03-13 RX ORDER — TAMSULOSIN HYDROCHLORIDE 0.4 MG/1
0.4 CAPSULE ORAL DAILY
Qty: 90 CAPSULE | Refills: 1 | Status: SHIPPED | OUTPATIENT
Start: 2025-03-13 | End: 2026-03-13

## 2025-03-13 RX ORDER — LEVOTHYROXINE SODIUM 50 UG/1
50 TABLET ORAL
Qty: 90 TABLET | Refills: 1 | Status: SHIPPED | OUTPATIENT
Start: 2025-03-13 | End: 2026-03-13

## 2025-03-14 ENCOUNTER — CLINICAL SUPPORT (OUTPATIENT)
Dept: CARDIAC REHAB | Facility: CLINIC | Age: 80
End: 2025-03-14
Payer: MEDICARE

## 2025-03-14 DIAGNOSIS — I21.4 ACUTE MYOCARDIAL INFARCTION, SUBENDOCARDIAL INFARCTION, INITIAL EPISODE OF CARE (MULTI): ICD-10-CM

## 2025-03-14 PROCEDURE — 93798 PHYS/QHP OP CAR RHAB W/ECG: CPT | Performed by: INTERNAL MEDICINE

## 2025-03-15 ENCOUNTER — CLINICAL SUPPORT (OUTPATIENT)
Dept: SLEEP MEDICINE | Facility: HOSPITAL | Age: 80
End: 2025-03-15
Payer: MEDICARE

## 2025-03-15 DIAGNOSIS — G47.10 HYPERSOMNIA, UNSPECIFIED: ICD-10-CM

## 2025-03-15 DIAGNOSIS — R06.83 SNORING: ICD-10-CM

## 2025-03-15 DIAGNOSIS — G47.9 SLEEP DISTURBANCE: ICD-10-CM

## 2025-03-15 PROCEDURE — 95806 SLEEP STUDY UNATT&RESP EFFT: CPT | Performed by: INTERNAL MEDICINE

## 2025-03-17 ENCOUNTER — CLINICAL SUPPORT (OUTPATIENT)
Dept: CARDIAC REHAB | Facility: CLINIC | Age: 80
End: 2025-03-17
Payer: MEDICARE

## 2025-03-17 DIAGNOSIS — I21.4 ACUTE MYOCARDIAL INFARCTION, SUBENDOCARDIAL INFARCTION, INITIAL EPISODE OF CARE (MULTI): ICD-10-CM

## 2025-03-17 PROCEDURE — 93798 PHYS/QHP OP CAR RHAB W/ECG: CPT | Performed by: INTERNAL MEDICINE

## 2025-03-18 ENCOUNTER — PATIENT OUTREACH (OUTPATIENT)
Dept: CARE COORDINATION | Facility: CLINIC | Age: 80
End: 2025-03-18
Payer: MEDICARE

## 2025-03-18 NOTE — PROGRESS NOTES
"Spoke to Francisco dutton: nutrition referral for heart healthy eating s/p NSTEMI. He is currently attending cardiac rehab. They have completed \"rate my plate.\" Also will be having nutrition education during the program. He explains he has been a pretty healthy eater most of his life. Since his MI his spouse is making efforts to make even more improvements to his diet. He is limiting sweets and treats, incorporating heart healthy foods and choosing olive oil for cooking. He states no questions at this time. He has my number and can also see the dietitian at cardiac rehab if he needs assistance.   "

## 2025-03-19 ENCOUNTER — PHARMACY VISIT (OUTPATIENT)
Dept: PHARMACY | Facility: CLINIC | Age: 80
End: 2025-03-19
Payer: COMMERCIAL

## 2025-03-19 ENCOUNTER — CLINICAL SUPPORT (OUTPATIENT)
Dept: CARDIAC REHAB | Facility: CLINIC | Age: 80
End: 2025-03-19
Payer: MEDICARE

## 2025-03-19 DIAGNOSIS — I21.4 ACUTE MYOCARDIAL INFARCTION, SUBENDOCARDIAL INFARCTION, INITIAL EPISODE OF CARE (MULTI): ICD-10-CM

## 2025-03-19 PROCEDURE — 93798 PHYS/QHP OP CAR RHAB W/ECG: CPT | Performed by: INTERNAL MEDICINE

## 2025-03-19 PROCEDURE — RXMED WILLOW AMBULATORY MEDICATION CHARGE

## 2025-03-19 NOTE — PROGRESS NOTES
Cardiac Rehab Education  Francisco Lizaam   [id number]    3/19/2025  Coronary artery disease education was done during today's session. Signs and symptoms of possible heart attack, risk factors for prevention, and lifestyle behavior modifications were all explained. Handout for personal use was given.    Signature China Carmona MS, CCRP

## 2025-03-21 ENCOUNTER — CLINICAL SUPPORT (OUTPATIENT)
Dept: CARDIAC REHAB | Facility: CLINIC | Age: 80
End: 2025-03-21
Payer: MEDICARE

## 2025-03-21 DIAGNOSIS — I21.4 ACUTE MYOCARDIAL INFARCTION, SUBENDOCARDIAL INFARCTION, INITIAL EPISODE OF CARE (MULTI): ICD-10-CM

## 2025-03-21 LAB
EJECTION FRACTION APICAL 4 CHAMBER: 61.5
EJECTION FRACTION: 63 %
LEFT ATRIUM VOLUME AREA LENGTH INDEX BSA: 28.9 ML/M2
LEFT VENTRICLE INTERNAL DIMENSION DIASTOLE: 4.09 CM (ref 3.5–6)
MITRAL VALVE E/A RATIO: 0.88
RIGHT VENTRICLE FREE WALL PEAK S': 10 CM/S
RIGHT VENTRICLE PEAK SYSTOLIC PRESSURE: 21.8 MMHG
TRICUSPID ANNULAR PLANE SYSTOLIC EXCURSION: 1.7 CM

## 2025-03-21 PROCEDURE — 93798 PHYS/QHP OP CAR RHAB W/ECG: CPT | Performed by: INTERNAL MEDICINE

## 2025-03-24 ENCOUNTER — CLINICAL SUPPORT (OUTPATIENT)
Dept: CARDIAC REHAB | Facility: CLINIC | Age: 80
End: 2025-03-24
Payer: MEDICARE

## 2025-03-24 DIAGNOSIS — I21.4 ACUTE MYOCARDIAL INFARCTION, SUBENDOCARDIAL INFARCTION, INITIAL EPISODE OF CARE (MULTI): ICD-10-CM

## 2025-03-24 PROCEDURE — 93798 PHYS/QHP OP CAR RHAB W/ECG: CPT | Performed by: INTERNAL MEDICINE

## 2025-03-26 ENCOUNTER — CLINICAL SUPPORT (OUTPATIENT)
Dept: CARDIAC REHAB | Facility: CLINIC | Age: 80
End: 2025-03-26
Payer: MEDICARE

## 2025-03-26 DIAGNOSIS — I21.4 ACUTE MYOCARDIAL INFARCTION, SUBENDOCARDIAL INFARCTION, INITIAL EPISODE OF CARE (MULTI): ICD-10-CM

## 2025-03-26 PROCEDURE — 93798 PHYS/QHP OP CAR RHAB W/ECG: CPT | Performed by: INTERNAL MEDICINE

## 2025-03-26 NOTE — PROGRESS NOTES
Cardiac Rehab Education  Francisco Lizama   [id number]    3/26/2025  Effects of stress on the body, risks of unmanaged stress and techniques to minimize anxieties were all discussed in today's session. Handout was given for reference with resources to  Behavioral Health Services should patient need further assistance with stress management. Patient encouraged to notify staff if stressors increase and additional help is required.      Signature Mahesh Rosas RN

## 2025-03-28 ENCOUNTER — CLINICAL SUPPORT (OUTPATIENT)
Dept: CARDIAC REHAB | Facility: CLINIC | Age: 80
End: 2025-03-28
Payer: MEDICARE

## 2025-03-28 DIAGNOSIS — I21.4 ACUTE MYOCARDIAL INFARCTION, SUBENDOCARDIAL INFARCTION, INITIAL EPISODE OF CARE (MULTI): ICD-10-CM

## 2025-03-28 PROCEDURE — 93798 PHYS/QHP OP CAR RHAB W/ECG: CPT | Performed by: INTERNAL MEDICINE

## 2025-03-31 ENCOUNTER — CLINICAL SUPPORT (OUTPATIENT)
Dept: CARDIAC REHAB | Facility: CLINIC | Age: 80
End: 2025-03-31
Payer: MEDICARE

## 2025-03-31 DIAGNOSIS — I21.4 ACUTE MYOCARDIAL INFARCTION, SUBENDOCARDIAL INFARCTION, INITIAL EPISODE OF CARE (MULTI): ICD-10-CM

## 2025-03-31 PROCEDURE — 93798 PHYS/QHP OP CAR RHAB W/ECG: CPT | Performed by: INTERNAL MEDICINE

## 2025-04-02 ENCOUNTER — CLINICAL SUPPORT (OUTPATIENT)
Dept: CARDIAC REHAB | Facility: CLINIC | Age: 80
End: 2025-04-02
Payer: MEDICARE

## 2025-04-02 DIAGNOSIS — I21.4 ACUTE MYOCARDIAL INFARCTION, SUBENDOCARDIAL INFARCTION, INITIAL EPISODE OF CARE (MULTI): ICD-10-CM

## 2025-04-02 PROCEDURE — 93798 PHYS/QHP OP CAR RHAB W/ECG: CPT | Performed by: INTERNAL MEDICINE

## 2025-04-02 NOTE — PROGRESS NOTES
Francisco Lizama  1945  [id number]  79 y.o.    [facility] [id number] CARDHILARIOB      Cardiac/Pulmonary Rehab Nutrition Education    4/2/2025  Label reading education was done with program's RDN during today's visit. Label Reading Highlights handout was given with information discussed during appointment and to assist in review of label contents at home.      Signature Fide Cook RDN, LD

## 2025-04-04 ENCOUNTER — CLINICAL SUPPORT (OUTPATIENT)
Dept: CARDIAC REHAB | Facility: CLINIC | Age: 80
End: 2025-04-04
Payer: MEDICARE

## 2025-04-04 ENCOUNTER — PATIENT OUTREACH (OUTPATIENT)
Dept: CARE COORDINATION | Facility: CLINIC | Age: 80
End: 2025-04-04
Payer: MEDICARE

## 2025-04-04 DIAGNOSIS — I21.4 ACUTE MYOCARDIAL INFARCTION, SUBENDOCARDIAL INFARCTION, INITIAL EPISODE OF CARE (MULTI): ICD-10-CM

## 2025-04-04 PROCEDURE — 93798 PHYS/QHP OP CAR RHAB W/ECG: CPT | Performed by: INTERNAL MEDICINE

## 2025-04-04 NOTE — PROGRESS NOTES
Outreach call to patient to follow up after 30 days of hospital discharge. Unable to connect with patient. Patient is engaged with the Cardiac Rehab program.  Will dis enroll patient from transitions of care.     LEONARD Sutherland, RN   [phone]   ACO Department

## 2025-04-07 ENCOUNTER — DOCUMENTATION (OUTPATIENT)
Dept: CARDIAC REHAB | Facility: CLINIC | Age: 80
End: 2025-04-07

## 2025-04-07 ENCOUNTER — CLINICAL SUPPORT (OUTPATIENT)
Dept: CARDIAC REHAB | Facility: CLINIC | Age: 80
End: 2025-04-07
Payer: MEDICARE

## 2025-04-07 ENCOUNTER — DOCUMENTATION (OUTPATIENT)
Dept: CARDIAC REHAB | Facility: CLINIC | Age: 80
End: 2025-04-07
Payer: MEDICARE

## 2025-04-07 DIAGNOSIS — I21.4 ACUTE MYOCARDIAL INFARCTION, SUBENDOCARDIAL INFARCTION, INITIAL EPISODE OF CARE (MULTI): ICD-10-CM

## 2025-04-07 PROCEDURE — 93798 PHYS/QHP OP CAR RHAB W/ECG: CPT | Performed by: INTERNAL MEDICINE

## 2025-04-07 NOTE — PROGRESS NOTES
Cardiac Rehabilitation 30 Day Progress Report    Name: Francisco Lizama  Medical Record Number: [id number]  YOB: 1945  Age: 79 y.o.    Today’s Date: 3/7/2025  Primary Care Physician: Joel Castaneda MD  Referring Physician: James Calix MD  Program Location: [address]     General  Primary Diagnosis:   1. NSTEMI (non-ST elevated myocardial infarction) (Multi)  Referral to Cardiac Rehab         Onset/Date of Diagnosis: 2/22/2025    Session #:  10    AACVPR Risk Stratification: Moderate     Falls Risk: Low  Psychosocial Assessment     Pre PHQ-9: 1  Sent PH-Q 9 to MD if score > 20: No; score < 20  Post PHQ_9:  pending  Pt reported/currently experiencing stress: No  Patient uses stress management skills: No   History of: no history of anxiety or depression  Currently seeing a mental health provider: No  Social Support: Yes, Whom:wife  Quality of Life Survey: SF-36   SF-36 Pre Post   Physical Component Score     Mental Component Score       Learning Assessment:  Learning assessment/barriers: None  Preferred learning method: Visual and hands on  Barriers: None  Comments:    Stages of Change: Action     Psychosocial Plan    Goal Status: In Progress    Psychosocial Goals: Maintain or lower PH-Q 9 score by discharge    Psychosocial Interventions/Education: To be done in Cardiac Rehab.  3/7/25 reviewed initial PHQ-9 results w/ pt at [facility]/MS  3/26/2025  Effects of stress on the body, risks of unmanaged stress and techniques to minimize anxieties were all discussed in today's session. Handout was given for reference with resources to  Behavioral Health Services should patient need further assistance with stress management. Patient encouraged to notify staff if stressors increase and additional help is required.  Signature Mahesh Rosas, RN    Nutrition Assessment:    Hyperlipidemia: Yes     Lipids:   Lab Results   Component Value Date    CHOL 187 02/22/2025    HDL 39.1 02/22/2025    LDLF 72  11/25/2022    TRIG 239 (H) 02/22/2025       Current Dietary Guidelines: Low fat, Low sodium  Barriers to dietary change: no    Diet Habit Survey: Picture Your Plate  Pre: 70  Post: To be done at discharge.    Diabetes Assessment    Lab Results   Component Value Date    HGBA1C 5.9 (H) 03/19/2024       History of Diabetes: No    Weight Management    Height: 68 inches  Weight: 160 lbs  BMI: 24.33      Nutrition Plan    Goal Status: Initial Assessment; goals not yet started    Nutrition Goals: Lipid Goal: HDL>45, LDL <70, Total <180, Trigs <150, Improve Diet Habit Survey score by 5-10 points by discharge, Adapt a Mediterranean focused diet prior to discharge, and Learn how to read and interpret nutrition labels prior to discharge    Nutrition Interventions/Education:   4/2/2025  Label reading education was done with program's RDN during today's visit. Label Reading Highlights handout was given with information discussed during appointment and to assist in review of label contents at home.  Signature Fide Cook RDN, LD  4/2/25 gave pt CR book, reviewed lipid profile/MS      Exercise Assessment    Home Exercise: No  Mode: TM, stationary Bike, Rower  Frequency: NA  Duration: NA    Exercise Prescription     Exercise Prescription based on: Duke Activity Status Index (DASI)    DASI Score: 21.45   MET Score: 5.4   Frequency:  3 days/week   Mode: Treadmill, NuStep, and Recumbent Cycle   Duration: 45 total aerobic minutes   Intensity: RPE 12-16  Target HR: 82-92  MET Level: 3.4  Patient wears supplemental O2: No     Modality Workload METs Duration (minutes)   1 Pre-Exercise   2:00   2 Treadmill 2.6 mph @ 2.5% 2.7 15 :00   3 NuStep  80 [place]ubin @ Load 6 3.4 15 :00   4 Recumbent Bike Load 4 @ 55 rpm  2.8 15 :00   5 Cooldown    2 :00   6 Post-Exercise   5:00     Resistance Training: No   Home Exercise Prescription given: To be given prior to discharge from program.    Exercise Plan    Goal Status: In Progress    Exercise Goals:  Increase exercise MET level by 5-10% each week, Increase total exercise duration to 30-45 minutes, Obtain 150 minutes/week of moderate intensity aerobic exercise, and Establish a home exercise program before discharge    Exercise Interventions/Education:   4/2/25 Adjusted THR/MS        Other Core Components/Risk Factor Assessment:    Medication adherence  Current Medications:   Medication Documentation Review Audit       Reviewed by Mahesh Rosas RN (Registered Nurse) on 03/07/25 at 1426      Medication Order Taking? Sig Documenting Provider Last Dose Status   aspirin 81 mg chewable tablet [id number] Yes Chew 1 tablet (81 mg) once daily. PARTH Pozo  Active    Patient not taking:   Discontinued 03/03/25 1330     Discontinued 03/03/25 1329   levothyroxine (Synthroid, Levoxyl) 50 mcg tablet [id number] Yes Take 1 tablet (50 mcg) by mouth once every day. Tommy Corcoran MD  Active   pantoprazole (Protonix) 20 mg EC tablet [id number] Yes Take 1 tablet (20 mg) by mouth 2 times a day. Take before breakfast and before dinner Do not crush, chew, or split.   Patient taking differently: Take 1 tablet (20 mg) by mouth once daily. Take before breakfast and before dinner Do not crush, chew, or split.    Joel Castaneda MD  Active    Patient not taking:   Discontinued 03/03/25 1329   rosuvastatin (Crestor) 40 mg tablet [id number] Yes Take 1 tablet (40 mg) by mouth once daily at bedtime. PARTH Barajas, DNP  Active    Patient not taking:   Discontinued 03/03/25 1329   spironolactone (Aldactone) 25 mg tablet [id number] Yes Take 1 tablet (25 mg) by mouth once daily. James Calix MD  Active   tamsulosin (Flomax) 0.4 mg 24 hr capsule [id number] Yes Take 1 capsule (0.4 mg) by mouth once daily. Tommy Corcoran MD  Active   ticagrelor (Brilinta) 90 mg tablet [id number] Yes Take 1 tablet (90 mg) by mouth 2 times a day. PARTH Barajas, DNP  Active                                 Medication  compliance: Yes   Uses pill box/organizer: Yes    Carries medication list: Yes MyChart    Blood Pressure Management  History of Hypertension: No   Medication Changes: No   Resting BP:  Visit Vitals  /66 (BP Location: Left arm, Patient Position: Sitting, BP Cuff Size: Small adult)   Pulse 63   Resp 14        Heart Failure Management  Hx of Heart Failure: No    Smoking/Tobacco Assessment  Social History     Tobacco Use   Smoking Status Never   Smokeless Tobacco Never       Other Core Component Plan    Goal Status: In Progress    Other Core Component Goals: Verbalize medication usage and drug actions by discharge and Achieve resting BP of < 130/80 by discharge    Other Core Component Interventions/Education:   3/19/2025  Coronary artery disease education was done during today's session. Signs and symptoms of possible heart attack, risk factors for prevention, and lifestyle behavior modifications were all explained. Handout for personal use was given.    Signature China Carmona MS, CCRP        Individual Patient Goals:    Get back to doing Ld chi by midpoint.  Get back to volunteering at [facility] by midpoint.    Goal Status: In Progress    Staff Comments:  Mr. Lizama has been adherent to his cardiac rehab exercise sessions since beginning the program.  He has begun to make good progress on his outcomes and goals.  Elevated heart rates noted on treadmill-MD notified.  No further cardiac complaints voiced.    Rehab Staff Signature: China Carmona MS, CCRP

## 2025-04-07 NOTE — PROGRESS NOTES
4/7/25 spoke with Dr Calix about Mr Lizama not feeling well. He was advised to take half tablet 12.5 mg of spironolactone and take his Flomax at night. He is also advised to drink more fluids

## 2025-04-09 ENCOUNTER — CLINICAL SUPPORT (OUTPATIENT)
Dept: CARDIAC REHAB | Facility: CLINIC | Age: 80
End: 2025-04-09
Payer: MEDICARE

## 2025-04-09 DIAGNOSIS — I21.4 ACUTE MYOCARDIAL INFARCTION, SUBENDOCARDIAL INFARCTION, INITIAL EPISODE OF CARE (MULTI): ICD-10-CM

## 2025-04-09 PROCEDURE — 93798 PHYS/QHP OP CAR RHAB W/ECG: CPT | Performed by: INTERNAL MEDICINE

## 2025-04-09 NOTE — PROGRESS NOTES
Cardiac Rehab Education  Francisco Lizama   [id number]    4/9/2025  Discussion of prescribed drug names, doses, side effects, and medication uses was done with patient. Education handout, Medications Used for Cardiac Conditions was also given to the patient. Patient was instructed to come back with any questions.      Signature Vida Steve RN

## 2025-04-11 ENCOUNTER — CLINICAL SUPPORT (OUTPATIENT)
Dept: CARDIAC REHAB | Facility: CLINIC | Age: 80
End: 2025-04-11
Payer: MEDICARE

## 2025-04-11 DIAGNOSIS — I21.4 ACUTE MYOCARDIAL INFARCTION, SUBENDOCARDIAL INFARCTION, INITIAL EPISODE OF CARE (MULTI): ICD-10-CM

## 2025-04-11 PROCEDURE — 93798 PHYS/QHP OP CAR RHAB W/ECG: CPT | Performed by: INTERNAL MEDICINE

## 2025-04-14 ENCOUNTER — PHARMACY VISIT (OUTPATIENT)
Dept: PHARMACY | Facility: CLINIC | Age: 80
End: 2025-04-14
Payer: COMMERCIAL

## 2025-04-14 ENCOUNTER — OFFICE VISIT (OUTPATIENT)
Dept: CARDIOLOGY | Facility: CLINIC | Age: 80
End: 2025-04-14
Payer: MEDICARE

## 2025-04-14 ENCOUNTER — CLINICAL SUPPORT (OUTPATIENT)
Dept: CARDIAC REHAB | Facility: CLINIC | Age: 80
End: 2025-04-14
Payer: MEDICARE

## 2025-04-14 VITALS
OXYGEN SATURATION: 96 % | SYSTOLIC BLOOD PRESSURE: 114 MMHG | DIASTOLIC BLOOD PRESSURE: 70 MMHG | HEART RATE: 54 BPM | HEIGHT: 68 IN | BODY MASS INDEX: 24.33 KG/M2

## 2025-04-14 DIAGNOSIS — I21.4 NSTEMI (NON-ST ELEVATED MYOCARDIAL INFARCTION) (MULTI): Primary | ICD-10-CM

## 2025-04-14 DIAGNOSIS — R07.89 ATYPICAL CHEST PAIN: ICD-10-CM

## 2025-04-14 DIAGNOSIS — I21.4 NSTEMI (NON-ST ELEVATED MYOCARDIAL INFARCTION) (MULTI): ICD-10-CM

## 2025-04-14 DIAGNOSIS — I21.4 ACUTE MYOCARDIAL INFARCTION, SUBENDOCARDIAL INFARCTION, INITIAL EPISODE OF CARE (MULTI): ICD-10-CM

## 2025-04-14 DIAGNOSIS — Z95.5 STATUS POST PLACEMENT OF STENT IN RIGHT CORONARY ARTERY: ICD-10-CM

## 2025-04-14 PROCEDURE — 99215 OFFICE O/P EST HI 40 MIN: CPT | Performed by: STUDENT IN AN ORGANIZED HEALTH CARE EDUCATION/TRAINING PROGRAM

## 2025-04-14 PROCEDURE — G2211 COMPLEX E/M VISIT ADD ON: HCPCS | Performed by: STUDENT IN AN ORGANIZED HEALTH CARE EDUCATION/TRAINING PROGRAM

## 2025-04-14 PROCEDURE — 1036F TOBACCO NON-USER: CPT | Performed by: STUDENT IN AN ORGANIZED HEALTH CARE EDUCATION/TRAINING PROGRAM

## 2025-04-14 PROCEDURE — 1159F MED LIST DOCD IN RCRD: CPT | Performed by: STUDENT IN AN ORGANIZED HEALTH CARE EDUCATION/TRAINING PROGRAM

## 2025-04-14 PROCEDURE — 93798 PHYS/QHP OP CAR RHAB W/ECG: CPT | Performed by: INTERNAL MEDICINE

## 2025-04-14 PROCEDURE — 1160F RVW MEDS BY RX/DR IN RCRD: CPT | Performed by: STUDENT IN AN ORGANIZED HEALTH CARE EDUCATION/TRAINING PROGRAM

## 2025-04-14 PROCEDURE — 1126F AMNT PAIN NOTED NONE PRSNT: CPT | Performed by: STUDENT IN AN ORGANIZED HEALTH CARE EDUCATION/TRAINING PROGRAM

## 2025-04-14 PROCEDURE — RXMED WILLOW AMBULATORY MEDICATION CHARGE

## 2025-04-14 RX ORDER — TICAGRELOR 90 MG/1
90 TABLET ORAL 2 TIMES DAILY
Qty: 180 TABLET | Refills: 0 | Status: CANCELLED | OUTPATIENT
Start: 2025-04-14

## 2025-04-14 RX ORDER — CLOPIDOGREL BISULFATE 75 MG/1
75 TABLET ORAL DAILY
Qty: 90 TABLET | Refills: 3 | Status: SHIPPED | OUTPATIENT
Start: 2025-04-14 | End: 2026-04-14

## 2025-04-14 RX ORDER — UBIDECARENONE 100 MG
100 CAPSULE ORAL DAILY
COMMUNITY

## 2025-04-14 ASSESSMENT — ENCOUNTER SYMPTOMS
DEPRESSION: 0
LOSS OF SENSATION IN FEET: 1
OCCASIONAL FEELINGS OF UNSTEADINESS: 0

## 2025-04-14 ASSESSMENT — PATIENT HEALTH QUESTIONNAIRE - PHQ9
2. FEELING DOWN, DEPRESSED OR HOPELESS: NOT AT ALL
1. LITTLE INTEREST OR PLEASURE IN DOING THINGS: NOT AT ALL
SUM OF ALL RESPONSES TO PHQ9 QUESTIONS 1 AND 2: 0

## 2025-04-14 ASSESSMENT — PAIN SCALES - GENERAL: PAINLEVEL_OUTOF10: 0-NO PAIN

## 2025-04-14 NOTE — ASSESSMENT & PLAN NOTE
- Not related to angina from 2 months ago, could be related to medication side effect (Brilinta or high dose Crestor)  - Will reassess after switching Brilinta

## 2025-04-14 NOTE — PROGRESS NOTES
Location of visit: [address]   Type of Visit: Established - Last Seen: 3/3/2025     Chief Complaint:  Patient was referred to Cardiology for chest pain.    History Of Present Illness:    Francisco Lizama is a 80 y.o. male,  with history significant for mild CAD, Hypertension, Hyperlipidemia, Hypothyroidism, BPH, who visits Cardiology today as a follow up visit. He was admitted on 2/22/2025 through 2/25/2025 due to NSTEMI with mid RCA disease. There was also 80% disease of a non-dominant mid LCx-OM2. He underwent RCA PTCA with a 3.5x32 mm Synergy TORIE. TTE pre PCI showed normal LVEF and no regional wall motion abnormalities. BNP was mildly elevated. No orthopnea or dyspnea on exertion post procedure but he reports recurrent chest discomfort which can be triggered with minimal activity or exercise but it doesn't happen consistently. I occurs daily once or twice and last seconds but can have some residual discomfort for hours. It doesn't limit his exercise capacity but is concerned about it.      Last  post MI.    Patient denies dyspnea on exertion, shortness of breath, orthopnea, PND, nocturia, edema, palpitations, dizziness, lightheadedness, syncope, claudication, or snoring/apnea.    Blood pressure: 114/70 mmHg  HR: 54 bpm    Today's ECG shows sinus rhythm at 54 bpm, first degree AVB (208 ms), T wave inversion D2, D3, aVF, and abnormal ventricular repolarization.    Past Medical History:  He has a past medical history of Acquired hypothyroidism, BPH (benign prostatic hyperplasia), Gout, Kidney stone, Mixed hyperlipidemia, Paroxysmal atrial fibrillation (Multi), Raynaud's syndrome without gangrene, and Scrotal mass.    Past Surgical History:  He has a past surgical history that includes Tonsillectomy and Cardiac catheterization (N/A, 2/24/2025).    Social History:  He reports that he has never smoked. He has never used smokeless tobacco. He reports current alcohol use of about 1.0 standard drink of alcohol  "per week. He reports that he does not currently use drugs.    Family History:  Family History   Problem Relation Name Age of Onset    Other (lung fibrosis) Mother      Other (CABG) Father      No Known Problems Brother      No Known Problems Daughter       Allergies:  Patient has no known allergies.    Outpatient Medications:  Current Outpatient Medications   Medication Instructions    aspirin 81 mg, oral, Daily    Brilinta 90 mg, oral, 2 times daily    coenzyme Q-10 (CO Q-10) 100 mg, Daily    levothyroxine (SYNTHROID, LEVOXYL) 50 mcg, oral, Every Day    pantoprazole (PROTONIX) 20 mg, oral, 2 times daily, Take before breakfast and before dinner Do not crush, chew, or split.    rosuvastatin (CRESTOR) 40 mg, oral, Nightly    spironolactone (ALDACTONE) 25 mg, oral, Daily    tamsulosin (FLOMAX) 0.4 mg, oral, Daily     Last Recorded Vitals:  Vitals:    04/14/25 1124   BP: 114/70   BP Location: Left arm   Patient Position: Sitting   BP Cuff Size: Adult   Pulse: 54   SpO2: 96%   Height: 1.727 m (5' 8\")     Physical Exam:      4/14/2025    11:24 AM 3/7/2025     2:15 PM 3/6/2025     8:57 AM 3/3/2025     1:15 PM 2/25/2025     3:57 PM 2/25/2025    12:26 PM   Vitals   Systolic 114 126 121 132 144 129   Diastolic 70 66 73 75 78 80   BP Location Left arm Left arm  Right arm Left arm Left arm   Heart Rate 54 63 60 59 78 72   Temp     36.7 °C (98 °F) 36.4 °C (97.6 °F)   Resp  14 12  16 16   Height 1.727 m (5' 8\") 1.727 m (5' 8\")  1.727 m (5' 8\")     Weight (lb)  160  160     BMI 24.33 kg/m2 24.33 kg/m2  24.33 kg/m2     BSA (m2) 1.87 m2 1.87 m2  1.87 m2     Visit Report Report  Report Report       Wt Readings from Last 5 Encounters:   03/07/25 72.6 kg (160 lb)   03/03/25 72.6 kg (160 lb)   02/24/25 71.7 kg (158 lb 1.1 oz)   09/24/24 73.5 kg (162 lb)   05/29/24 72.6 kg (160 lb)     General: Sitting up comfortably in chair; in no apparent distress.  HEENT: Normocephalic; atraumatic. Well hydrated.  Eyes: Anicteric sclera. Extraocular " movement intact.  Neck: Supple; no thyromegaly; normal jugular venous pressure, no bruits.  Respiratory: Bilateral air entry equal. No wheezing.  Cardiovascular: Normal S1, S2; no murmurs auscultated.  Abdomen: Nondistended; nontender. (+) bowel sounds.  Extremities: No peripheral edema present. Pulses 2+ diffusely.  Neurological: Oriented to time, place, and person; nonfocal.  Psychiatric: Normal affect.     Last Labs Reviewed:  CBC -  Recent Labs     02/25/25  0546 02/24/25  0629 02/22/25  1047 05/23/24 2222 03/20/24  0433   WBC 7.6 7.3 7.6 6.5 5.3   HGB 14.6 14.1 16.2 15.3 14.1   HCT 42.2 41.4 46.6 44.8 42.2   * 143* 129* 178 169   MCV 88 89 87 89 88     CMP -  Recent Labs     02/25/25  0546 02/23/25  0629 02/22/25  1047 10/18/24  1258 05/23/24 2222 03/20/24  0433 03/19/24  1215    139 138 140 138 139  --    K 4.6 4.3 4.5 4.4 4.2 4.4  --     105 106 107 103 105  --    CO2 23 27 26 25 24 27  --    ANIONGAP 12 11 11 12 15 11  --    BUN 13 17 19 16 22 15  --    CREATININE 0.97 0.99 0.97 0.96 1.01 1.02  --    EGFR 80 78 80 81 76 76  --    MG  --   --   --   --   --  2.00 2.20   CALCIUM 9.1 9.1 9.6 9.9 9.5 9.0  --      Recent Labs     02/23/25  0629 02/22/25  1047 05/23/24 2222 03/19/24  1050 11/30/23  0858   ALBUMIN 4.2 4.5 4.5 4.9 4.6   ALKPHOS 60 65 72 70 58   ALT 19 17 23 15 18   AST 23 26 24 18 17   BILITOT 1.3* 1.3* 1.2 1.4* 1.5*   LIPASE  --  11 21  --   --      LIPID PANEL -   Recent Labs     02/22/25  1148 03/20/24  0433 11/30/23  0858 11/25/22  1543 10/05/21  0840 12/13/19  1146   CHOL 187 142 152 174 160 146   LDLF  --   --   --  72 87 77   LDLCALC 100* 75 79  --   --   --    HDL 39.1 37.4 44.0 38.4* 41.3 40.7   TRIG 239* 147 145 318* 160* 142     COAGULATION PANEL -  Recent Labs     02/23/25  0629 02/23/25  0211 02/22/25  2227 02/22/25  1823 03/19/24  1306 01/19/20  1740   INR  --   --   --   --   --  1.0   HAUF 0.4 0.4 0.4   < >  --   --    DDIMERVTE  --   --   --   --  321 <215    <  > = values in this interval not displayed.     HEME/ENDO -  Recent Labs     02/22/25  1148 03/19/24  1215 03/19/24  1050 11/30/23  0858 11/25/22  1543 01/19/20  1740 12/13/19  1146 08/24/18  0828   TSH 3.35 3.14  --  2.62 3.05   < > 1.85 1.83   HGBA1C  --   --  5.9*  --   --   --   --   --    FREET4  --   --   --   --   --   --  1.05 0.89    < > = values in this interval not displayed.     CARDIOVASCULAR  Recent Labs     02/25/25  0546 02/24/25  0629 02/23/25  1556 02/23/25  0629 02/22/25  1823 02/22/25  1148 05/23/24  2321 05/23/24  2222 03/19/24  1215 03/19/24  1050 03/19/24  1050 01/20/20  0508 01/19/20  1740   TROPHS  --  1,266* 676* 821* 1,013* 637*   < > 4 4   < > 3  --   --    *  --   --   --   --   --   --  25  --   --  35 19 19   CRP  --   --   --   --   --   --   --   --  0.13  --   --   --   --     < > = values in this interval not displayed.     Last Cardiology/Imaging Tests Personally Reviewed (if images available) and Interpreted:  Encounter Date: 02/22/25   Electrocardiogram, 12-lead PRN ACS symptoms   Result Value     Ventricular Rate 50     Atrial Rate 50     UT Interval 200     QRS Duration 90     QT Interval 406     QTC Calculation(Bazett) 370     P Axis 48     R Axis 8     T Axis -46     QRS Count 8     Q Onset 222     P Onset 122     P Offset 178     T Offset 425     QTC Fredericia 382     Narrative     Sinus bradycardia  ST & T wave abnormality, consider inferior ischemia      Echocardiogram:  Transthoracic Echo (TTE) Complete 02/22/2025   1. The left ventricular systolic function is normal, with a visually estimated ejection fraction of 55-60%.   2. Spectral Doppler shows a Grade I (impaired relaxation pattern) of left ventricular diastolic filling with normal left atrial filling pressure.   3. Right ventricular systolic pressure is within normal limits.   4. Mild aortic valve regurgitation.     Cath:  Left Heart Cath with Coronary Angiography and LV 02/24/2025  1. Coronary Lesion  Summary:  LAD    50% stenosis    proximal  OM 2   80% stenosis      mid  RCA    99% stenosis      mid  2. Successful OCT guided PCI of the mid RCA with a 3.5 x 32 mm Synergy drug-eluting stent postdilated up to 3.75 mm.  3. 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  4. Moderate CAD of the LAD.  5. Elevated LVEDP.  6. No evidence of aortic stenosis.     Stress Test:  MR cardiac w and wo IV contrast w regadenoson stress for MORPH FUNCT and valve DZ 05/16/2024  1. Normal LV size and systolic function. Quantitative LVEF 69%.   2. No evidence of inducible myocardial ischemia using regadenoson  stress perfusion imaging.   3. No evidence of myocardial fibrosis, infiltration or infarction based on LGE imaging.   4. Normal RV size and systolic function. Quantitative RVEF 53%.     CV RISK FACTORS:   # Hypertension: Last BP: 114/70.  # Hyperlipidemia: Last Tchol 187 / LDL No results found for requested labs within last 365 days. / HDL 39.1 / TRIG 239 (2/22/2025: 11:48 AM).  # Type II Diabetes Mellitus: Last A1c No results found for requested labs within last 365 days. (No results in last year.).  # Obesity: Last BMI:  .  # CKD: Last BUN/Cr (GFR): 13/0.97 (80), 2/25/2025:  5:46 AM.    ASCV RISK:  The ASCVD Risk score (Mukul GHOSH, et al., 2019) failed to calculate for the following reasons:    Risk score cannot be calculated because patient has a medical history suggesting prior/existing ASCVD    Assessment:  80 y.o. male,  with history significant for mild CAD, Hypertension, Hyperlipidemia, Hypothyroidism, BPH, who visits Cardiology today as a follow up visit. He was admitted on 2/22/2025 through 2/25/2025 due to NSTEMI with mid RCA disease treated with TORIE PCI and with also 80% disease of a non-dominant mid LCx-OM2.  Assessment & Plan  NSTEMI (non-ST elevated myocardial infarction) (Multi)  - Switch to Plavix with 300 mg loading dose and reassess symptoms  Atypical chest pain  - Not related to angina from 2 months ago,  could be related to medication side effect (Brilinta or high dose Crestor)  - Will reassess after switching Brilinta    Patient will contact us once to update his symptoms  I spent 40 minutes assessing the case between pre-charting, face-to-face patient interaction, and documentation    James Calix MD

## 2025-04-16 ENCOUNTER — CLINICAL SUPPORT (OUTPATIENT)
Dept: CARDIAC REHAB | Facility: CLINIC | Age: 80
End: 2025-04-16
Payer: MEDICARE

## 2025-04-16 DIAGNOSIS — I21.4 ACUTE MYOCARDIAL INFARCTION, SUBENDOCARDIAL INFARCTION, INITIAL EPISODE OF CARE (MULTI): ICD-10-CM

## 2025-04-16 LAB
ATRIAL RATE: 54 BPM
P AXIS: 39 DEGREES
P OFFSET: 175 MS
P ONSET: 116 MS
PR INTERVAL: 208 MS
Q ONSET: 220 MS
QRS COUNT: 9 BEATS
QRS DURATION: 82 MS
QT INTERVAL: 394 MS
QTC CALCULATION(BAZETT): 373 MS
QTC FREDERICIA: 380 MS
R AXIS: 20 DEGREES
T AXIS: -23 DEGREES
T OFFSET: 417 MS
VENTRICULAR RATE: 54 BPM

## 2025-04-16 PROCEDURE — 93798 PHYS/QHP OP CAR RHAB W/ECG: CPT | Performed by: INTERNAL MEDICINE

## 2025-04-16 NOTE — PROGRESS NOTES
Cardiac Rehab Education  Francisco Lizama   [id number]    4/16/2025  Risk factors of cardiovascular disease were discussed today's. Education on modifiable and non-modifiable risk factors of CVD and recommendations to manage them was done today. Handouts were provided along with further reference. Patient was educated to return with further questions.     Signature China Carmona MS, CCRP

## 2025-04-18 ENCOUNTER — CLINICAL SUPPORT (OUTPATIENT)
Dept: CARDIAC REHAB | Facility: CLINIC | Age: 80
End: 2025-04-18
Payer: MEDICARE

## 2025-04-18 DIAGNOSIS — I21.4 ACUTE MYOCARDIAL INFARCTION, SUBENDOCARDIAL INFARCTION, INITIAL EPISODE OF CARE (MULTI): ICD-10-CM

## 2025-04-18 PROCEDURE — 93798 PHYS/QHP OP CAR RHAB W/ECG: CPT | Performed by: INTERNAL MEDICINE

## 2025-04-21 ENCOUNTER — CLINICAL SUPPORT (OUTPATIENT)
Dept: CARDIAC REHAB | Facility: CLINIC | Age: 80
End: 2025-04-21
Payer: MEDICARE

## 2025-04-21 DIAGNOSIS — I21.4 ACUTE MYOCARDIAL INFARCTION, SUBENDOCARDIAL INFARCTION, INITIAL EPISODE OF CARE (MULTI): ICD-10-CM

## 2025-04-21 PROCEDURE — 93798 PHYS/QHP OP CAR RHAB W/ECG: CPT | Performed by: INTERNAL MEDICINE

## 2025-04-23 ENCOUNTER — CLINICAL SUPPORT (OUTPATIENT)
Dept: CARDIAC REHAB | Facility: CLINIC | Age: 80
End: 2025-04-23
Payer: MEDICARE

## 2025-04-23 DIAGNOSIS — I21.4 ACUTE MYOCARDIAL INFARCTION, SUBENDOCARDIAL INFARCTION, INITIAL EPISODE OF CARE (MULTI): ICD-10-CM

## 2025-04-23 PROCEDURE — 93798 PHYS/QHP OP CAR RHAB W/ECG: CPT | Performed by: INTERNAL MEDICINE

## 2025-04-23 NOTE — PROGRESS NOTES
Cardiac Rehab Education  Francisco Lizama   [id number]    4/23/2025  Stretching education was done with patient during today's rehab session. Demonstrations of stretches was done with patient, in addition to discussing the benefits of flexibility training. Patient was provided handout with examples of exercises and further instructions to be practiced at home. Patient encouraged to report to rehab staff with any questions or concerns.    Signature China Carmona MS, CCRP

## 2025-04-25 ENCOUNTER — CLINICAL SUPPORT (OUTPATIENT)
Dept: CARDIAC REHAB | Facility: CLINIC | Age: 80
End: 2025-04-25
Payer: MEDICARE

## 2025-04-25 DIAGNOSIS — I21.4 ACUTE MYOCARDIAL INFARCTION, SUBENDOCARDIAL INFARCTION, INITIAL EPISODE OF CARE (MULTI): ICD-10-CM

## 2025-04-25 PROCEDURE — 93798 PHYS/QHP OP CAR RHAB W/ECG: CPT | Performed by: INTERNAL MEDICINE

## 2025-04-28 ENCOUNTER — CLINICAL SUPPORT (OUTPATIENT)
Dept: CARDIAC REHAB | Facility: CLINIC | Age: 80
End: 2025-04-28
Payer: MEDICARE

## 2025-04-28 DIAGNOSIS — I21.4 ACUTE MYOCARDIAL INFARCTION, SUBENDOCARDIAL INFARCTION, INITIAL EPISODE OF CARE (MULTI): ICD-10-CM

## 2025-04-28 PROCEDURE — 93798 PHYS/QHP OP CAR RHAB W/ECG: CPT | Performed by: INTERNAL MEDICINE

## 2025-04-30 ENCOUNTER — CLINICAL SUPPORT (OUTPATIENT)
Dept: CARDIAC REHAB | Facility: CLINIC | Age: 80
End: 2025-04-30
Payer: MEDICARE

## 2025-04-30 DIAGNOSIS — I21.4 ACUTE MYOCARDIAL INFARCTION, SUBENDOCARDIAL INFARCTION, INITIAL EPISODE OF CARE (MULTI): ICD-10-CM

## 2025-05-01 ENCOUNTER — CLINICAL SUPPORT (OUTPATIENT)
Dept: CARDIAC REHAB | Facility: CLINIC | Age: 80
End: 2025-05-01
Payer: MEDICARE

## 2025-05-01 DIAGNOSIS — I21.4 ACUTE MYOCARDIAL INFARCTION, SUBENDOCARDIAL INFARCTION, INITIAL EPISODE OF CARE (MULTI): ICD-10-CM

## 2025-05-01 PROCEDURE — 93798 PHYS/QHP OP CAR RHAB W/ECG: CPT | Performed by: INTERNAL MEDICINE

## 2025-05-01 NOTE — PROGRESS NOTES
Cardiac Rehab Education  Francisco Lizama   [id number]    5/1/2025  Cardiovascular changes with exercise were discussed in today's session. Both immediate and long term effects of exercise were covered and the importance of cooling down post workout was reviewed.      Maco Lang, [facility]-EP

## 2025-05-02 ENCOUNTER — CLINICAL SUPPORT (OUTPATIENT)
Dept: CARDIAC REHAB | Facility: CLINIC | Age: 80
End: 2025-05-02
Payer: MEDICARE

## 2025-05-02 DIAGNOSIS — I21.4 ACUTE MYOCARDIAL INFARCTION, SUBENDOCARDIAL INFARCTION, INITIAL EPISODE OF CARE (MULTI): ICD-10-CM

## 2025-05-02 PROCEDURE — 93798 PHYS/QHP OP CAR RHAB W/ECG: CPT | Performed by: INTERNAL MEDICINE

## 2025-05-05 ENCOUNTER — CLINICAL SUPPORT (OUTPATIENT)
Dept: CARDIAC REHAB | Facility: CLINIC | Age: 80
End: 2025-05-05
Payer: MEDICARE

## 2025-05-05 ENCOUNTER — DOCUMENTATION (OUTPATIENT)
Dept: CARDIAC REHAB | Facility: CLINIC | Age: 80
End: 2025-05-05
Payer: MEDICARE

## 2025-05-05 DIAGNOSIS — I21.4 ACUTE MYOCARDIAL INFARCTION, SUBENDOCARDIAL INFARCTION, INITIAL EPISODE OF CARE (MULTI): ICD-10-CM

## 2025-05-05 PROCEDURE — 93798 PHYS/QHP OP CAR RHAB W/ECG: CPT | Performed by: INTERNAL MEDICINE

## 2025-05-05 NOTE — PROGRESS NOTES
Cardiac Rehabilitation 60 Day Progress Report    Name: Francisco Lizama  Medical Record Number: [id number]  YOB: 1945  Age: 79 y.o.    Today’s Date: 3/7/2025  Primary Care Physician: Joel Castaneda MD  Referring Physician: James Calix MD  Program Location: [address]     General  Primary Diagnosis:   1. NSTEMI (non-ST elevated myocardial infarction) (Multi)  Referral to Cardiac Rehab         Onset/Date of Diagnosis: 2/22/2025    Session #:  22    AACVPR Risk Stratification: Moderate     Falls Risk: Low  Psychosocial Assessment     Pre PHQ-9: 1  Sent PH-Q 9 to MD if score > 20: No; score < 20  Post PHQ_9:  pending  Pt reported/currently experiencing stress: No  Patient uses stress management skills: No   History of: no history of anxiety or depression  Currently seeing a mental health provider: No  Social Support: Yes, Whom:wife  Learning Assessment:  Learning assessment/barriers: None  Preferred learning method: Visual and hands on  Barriers: None  Comments:    Stages of Change: Action     Psychosocial Plan    Goal Status: In Progress    Psychosocial Goals: Maintain or lower PH-Q 9 score by discharge    Psychosocial Interventions/Education:   3/7/25 reviewed initial PHQ-9 results w/ pt at eval/MS  3/26/2025  Effects of stress on the body, risks of unmanaged stress and techniques to minimize anxieties were all discussed in today's session. Handout was given for reference with resources to  Behavioral Health Services should patient need further assistance with stress management. Patient encouraged to notify staff if stressors increase and additional help is required.  Signature Mahesh Rosas RN    Nutrition Assessment:    Hyperlipidemia: Yes     Lipids:   Lab Results   Component Value Date    CHOL 187 02/22/2025    HDL 39.1 02/22/2025    LDLF 72 11/25/2022    TRIG 239 (H) 02/22/2025       Current Dietary Guidelines: Low fat, Low sodium  Barriers to dietary change: no    Diet Habit  Survey: Picture Your Plate  Pre: 70  Post: To be done at discharge.    Diabetes Assessment    Lab Results   Component Value Date    HGBA1C 5.9 (H) 03/19/2024       History of Diabetes: No    Weight Management    Height: 68 inches  Weight: 160 lbs  BMI: 24.33      Nutrition Plan    Goal Status: In Progress    Nutrition Goals: Lipid Goal: HDL>45, LDL <70, Total <180, Trigs <150, Improve Diet Habit Survey score by 5-10 points by discharge, Adapt a Mediterranean focused diet prior to discharge, and Learn how to read and interpret nutrition labels prior to discharge    Nutrition Interventions/Education:   4/2/2025  Label reading education was done with program's RDN during today's visit. Label Reading Highlights handout was given with information discussed during appointment and to assist in review of label contents at home.  Maco Cook RDN, LD  4/2/25 gave pt CR book, reviewed lipid profile/MS      Exercise Assessment    Home Exercise: No  Mode: TM, stationary Bike, Rower  Frequency: NA  Duration: NA    Exercise Prescription     Exercise Prescription based on: Duke Activity Status Index (DASI)    DASI Score: 21.45   MET Score: 5.4   Frequency:  3 days/week   Mode: Treadmill, NuStep, and Recumbent Cycle   Duration: 45 total aerobic minutes   Intensity: RPE 12-16  Target HR: 82-92  MET Level: 4.7  Patient wears supplemental O2: No     Modality Workload METs Duration (minutes)   1 Pre-Exercise   2:00   2 Treadmill 2.8 mph @ 4% 4.7 15 :00   3 NuStep  90 Lubin @ Load 7 3.7 15 :00   4 Recumbent Bike Load 5 @ 60 rpm  3.7 15 :00   5 Cooldown    2 :00   6 Post-Exercise   5:00     Resistance Training: No   Home Exercise Prescription given: To be given prior to discharge from program.    Exercise Plan    Goal Status: In Progress    Exercise Goals: Increase exercise MET level by 5-10% each week, Increase total exercise duration to 30-45 minutes, Obtain 150 minutes/week of moderate intensity aerobic exercise, and  Establish a home exercise program before discharge    Exercise Interventions/Education:   4/2/25 Adjusted THR/MS  4/23/2025  Stretching education was done with patient during today's rehab session. Demonstrations of stretches was done with patient, in addition to discussing the benefits of flexibility training. Patient was provided handout with examples of exercises and further instructions to be practiced at home. Patient encouraged to report to rehab staff with any questions or concerns.  Signature China Carmona MS, CCRP  5/1/2025  Cardiovascular changes with exercise were discussed in today's session. Both immediate and long term effects of exercise were covered and the importance of cooling down post workout was reviewed.  Maco Lang, ACS-EP      Other Core Components/Risk Factor Assessment:    Medication adherence  Current Medications:   Medication Documentation Review Audit       Reviewed by Mahesh Rosas RN (Registered Nurse) on 03/07/25 at 1426      Medication Order Taking? Sig Documenting Provider Last Dose Status   aspirin 81 mg chewable tablet [id number] Yes Chew 1 tablet (81 mg) once daily. PARTH Pozo  Active    Patient not taking:   Discontinued 03/03/25 1330     Discontinued 03/03/25 1329   levothyroxine (Synthroid, Levoxyl) 50 mcg tablet [id number] Yes Take 1 tablet (50 mcg) by mouth once every day. Tommy Corcoran MD  Active   pantoprazole (Protonix) 20 mg EC tablet [id number] Yes Take 1 tablet (20 mg) by mouth 2 times a day. Take before breakfast and before dinner Do not crush, chew, or split.   Patient taking differently: Take 1 tablet (20 mg) by mouth once daily. Take before breakfast and before dinner Do not crush, chew, or split.    Joel Castaneda MD  Active    Patient not taking:   Discontinued 03/03/25 1329   rosuvastatin (Crestor) 40 mg tablet [id number] Yes Take 1 tablet (40 mg) by mouth once daily at bedtime. CHERRY Barajas-ERNESTO, DNP  Active     Patient not taking:   Discontinued 03/03/25 1329   spironolactone (Aldactone) 25 mg tablet [id number] Yes Take 1 tablet (25 mg) by mouth once daily. James Calix MD  Active   tamsulosin (Flomax) 0.4 mg 24 hr capsule [id number] Yes Take 1 capsule (0.4 mg) by mouth once daily. Tommy Corcoran MD  Active   ticagrelor (Brilinta) 90 mg tablet [id number] Yes Take 1 tablet (90 mg) by mouth 2 times a day. Aldair Fernandez, APRN-CNP, DNP  Active                                 Medication compliance: Yes   Uses pill box/organizer: Yes    Carries medication list: Yes MyChart    Blood Pressure Management  History of Hypertension: No   Medication Changes: No   Resting BP:  Visit Vitals  /66 (BP Location: Left arm, Patient Position: Sitting, BP Cuff Size: Small adult)   Pulse 63   Resp 14        Heart Failure Management  Hx of Heart Failure: No    Smoking/Tobacco Assessment  Social History     Tobacco Use   Smoking Status Never   Smokeless Tobacco Never       Other Core Component Plan    Goal Status: In Progress    Other Core Component Goals: Verbalize medication usage and drug actions by discharge and Achieve resting BP of < 130/80 by discharge    Other Core Component Interventions/Education:   3/19/2025  Coronary artery disease education was done during today's session. Signs and symptoms of possible heart attack, risk factors for prevention, and lifestyle behavior modifications were all explained. Handout for personal use was given.    Maco China TAMI Carmona, MS, CCRP  4/7/25 spoke with Dr Calix about Mr Lizama not feeling well. He was advised to take half tablet 12.5 mg of spironolactone and take his Flomax at night. He is also advised to drink more fluids/SD  4/9/2025  Discussion of prescribed drug names, doses, side effects, and medication uses was done with patient. Education handout, Medications Used for Cardiac Conditions was also given to the patient. Patient was instructed to come back with any  questions.  Signature Vida Steve, RN  4/16/2025  Risk factors of cardiovascular disease were discussed today's. Education on modifiable and non-modifiable risk factors of CVD and recommendations to manage them was done today. Handouts were provided along with further reference. Patient was educated to return with further questions. Signature China Carmona MS, CCRP    Individual Patient Goals:    Get back to doing Ld chi by midpoint.  Get back to volunteering at [facility] by midpoint.    Goal Status: In Progress    Staff Comments:  Mr. Lizama remains adherent to his cardiac rehab exercise sessions since beginning the program.  He continues to make good progress on his outcomes and goals.  No further cardiac complaints voiced.    Rehab Staff Signature: China Carmona MS, CCRP

## 2025-05-07 ENCOUNTER — CLINICAL SUPPORT (OUTPATIENT)
Dept: CARDIAC REHAB | Facility: CLINIC | Age: 80
End: 2025-05-07
Payer: MEDICARE

## 2025-05-07 DIAGNOSIS — I21.4 ACUTE MYOCARDIAL INFARCTION, SUBENDOCARDIAL INFARCTION, INITIAL EPISODE OF CARE (MULTI): ICD-10-CM

## 2025-05-08 ENCOUNTER — CLINICAL SUPPORT (OUTPATIENT)
Dept: CARDIAC REHAB | Facility: CLINIC | Age: 80
End: 2025-05-08
Payer: MEDICARE

## 2025-05-08 DIAGNOSIS — I21.4 ACUTE MYOCARDIAL INFARCTION, SUBENDOCARDIAL INFARCTION, INITIAL EPISODE OF CARE (MULTI): ICD-10-CM

## 2025-05-08 PROCEDURE — 93798 PHYS/QHP OP CAR RHAB W/ECG: CPT | Performed by: INTERNAL MEDICINE

## 2025-05-09 ENCOUNTER — CLINICAL SUPPORT (OUTPATIENT)
Dept: CARDIAC REHAB | Facility: CLINIC | Age: 80
End: 2025-05-09
Payer: MEDICARE

## 2025-05-09 DIAGNOSIS — I21.4 ACUTE MYOCARDIAL INFARCTION, SUBENDOCARDIAL INFARCTION, INITIAL EPISODE OF CARE (MULTI): ICD-10-CM

## 2025-05-09 PROCEDURE — 93798 PHYS/QHP OP CAR RHAB W/ECG: CPT | Performed by: INTERNAL MEDICINE

## 2025-05-12 ENCOUNTER — CLINICAL SUPPORT (OUTPATIENT)
Dept: CARDIAC REHAB | Facility: CLINIC | Age: 80
End: 2025-05-12
Payer: MEDICARE

## 2025-05-12 DIAGNOSIS — I21.4 ACUTE MYOCARDIAL INFARCTION, SUBENDOCARDIAL INFARCTION, INITIAL EPISODE OF CARE (MULTI): ICD-10-CM

## 2025-05-12 PROCEDURE — 93798 PHYS/QHP OP CAR RHAB W/ECG: CPT | Performed by: INTERNAL MEDICINE

## 2025-05-14 ENCOUNTER — APPOINTMENT (OUTPATIENT)
Dept: CARDIAC REHAB | Facility: CLINIC | Age: 80
End: 2025-05-14
Payer: MEDICARE

## 2025-05-15 ENCOUNTER — CLINICAL SUPPORT (OUTPATIENT)
Dept: CARDIAC REHAB | Facility: CLINIC | Age: 80
End: 2025-05-15
Payer: MEDICARE

## 2025-05-15 DIAGNOSIS — I21.4 ACUTE MYOCARDIAL INFARCTION, SUBENDOCARDIAL INFARCTION, INITIAL EPISODE OF CARE (MULTI): ICD-10-CM

## 2025-05-15 PROCEDURE — 93798 PHYS/QHP OP CAR RHAB W/ECG: CPT | Performed by: INTERNAL MEDICINE

## 2025-05-15 NOTE — PROGRESS NOTES
Cardiac Rehab Education  Francisco Lizama   [id number]    5/15/2025  Education on sodium intake and consequences of high blood pressure was done during today's session. Discussed with patient the risks of excess levels of sodium and how to decrease dietary intake with provided list of substitutions. Handouts were given for home reference.      Signature Mahesh Rosas RN

## 2025-05-16 ENCOUNTER — CLINICAL SUPPORT (OUTPATIENT)
Dept: CARDIAC REHAB | Facility: CLINIC | Age: 80
End: 2025-05-16
Payer: MEDICARE

## 2025-05-16 DIAGNOSIS — Z95.5 STATUS POST PLACEMENT OF STENT IN RIGHT CORONARY ARTERY: ICD-10-CM

## 2025-05-16 DIAGNOSIS — I21.4 NSTEMI (NON-ST ELEVATED MYOCARDIAL INFARCTION) (MULTI): ICD-10-CM

## 2025-05-16 DIAGNOSIS — I21.4 ACUTE MYOCARDIAL INFARCTION, SUBENDOCARDIAL INFARCTION, INITIAL EPISODE OF CARE (MULTI): ICD-10-CM

## 2025-05-16 PROCEDURE — 93798 PHYS/QHP OP CAR RHAB W/ECG: CPT | Performed by: INTERNAL MEDICINE

## 2025-05-16 PROCEDURE — RXMED WILLOW AMBULATORY MEDICATION CHARGE

## 2025-05-16 RX ORDER — ROSUVASTATIN CALCIUM 40 MG/1
40 TABLET, COATED ORAL NIGHTLY
Qty: 90 TABLET | Refills: 0 | Status: SHIPPED | OUTPATIENT
Start: 2025-05-16

## 2025-05-19 ENCOUNTER — CLINICAL SUPPORT (OUTPATIENT)
Dept: CARDIAC REHAB | Facility: CLINIC | Age: 80
End: 2025-05-19
Payer: MEDICARE

## 2025-05-19 ENCOUNTER — PHARMACY VISIT (OUTPATIENT)
Dept: PHARMACY | Facility: CLINIC | Age: 80
End: 2025-05-19
Payer: COMMERCIAL

## 2025-05-19 DIAGNOSIS — I21.4 ACUTE MYOCARDIAL INFARCTION, SUBENDOCARDIAL INFARCTION, INITIAL EPISODE OF CARE (MULTI): ICD-10-CM

## 2025-05-19 PROCEDURE — 93798 PHYS/QHP OP CAR RHAB W/ECG: CPT | Performed by: INTERNAL MEDICINE

## 2025-05-21 ENCOUNTER — APPOINTMENT (OUTPATIENT)
Dept: CARDIAC REHAB | Facility: CLINIC | Age: 80
End: 2025-05-21
Payer: MEDICARE

## 2025-05-22 ENCOUNTER — CLINICAL SUPPORT (OUTPATIENT)
Dept: CARDIAC REHAB | Facility: CLINIC | Age: 80
End: 2025-05-22
Payer: MEDICARE

## 2025-05-22 DIAGNOSIS — I21.4 ACUTE MYOCARDIAL INFARCTION, SUBENDOCARDIAL INFARCTION, INITIAL EPISODE OF CARE (MULTI): ICD-10-CM

## 2025-05-22 PROCEDURE — 93798 PHYS/QHP OP CAR RHAB W/ECG: CPT | Performed by: INTERNAL MEDICINE

## 2025-05-22 NOTE — PROGRESS NOTES
Cardiac Rehab Education  Francisco Lizama   [id number]    5/22/2025  Resistance training education was done. Resistance bands were measured and cut for patient to begin home strength training exercises. Frequency, intensity/RPE, and form were reviewed with educational handout given for further reference. Patient was encouraged to comeback with any questions.      Maco Rosas RN

## 2025-05-23 ENCOUNTER — CLINICAL SUPPORT (OUTPATIENT)
Dept: CARDIAC REHAB | Facility: CLINIC | Age: 80
End: 2025-05-23
Payer: MEDICARE

## 2025-05-23 DIAGNOSIS — I21.4 ACUTE MYOCARDIAL INFARCTION, SUBENDOCARDIAL INFARCTION, INITIAL EPISODE OF CARE (MULTI): ICD-10-CM

## 2025-05-23 PROCEDURE — 93798 PHYS/QHP OP CAR RHAB W/ECG: CPT | Performed by: INTERNAL MEDICINE

## 2025-05-27 ENCOUNTER — APPOINTMENT (OUTPATIENT)
Dept: PRIMARY CARE | Facility: CLINIC | Age: 80
End: 2025-05-27
Payer: MEDICARE

## 2025-05-27 PROCEDURE — RXMED WILLOW AMBULATORY MEDICATION CHARGE

## 2025-05-28 ENCOUNTER — APPOINTMENT (OUTPATIENT)
Dept: CARDIAC REHAB | Facility: CLINIC | Age: 80
End: 2025-05-28
Payer: MEDICARE

## 2025-05-29 ENCOUNTER — CLINICAL SUPPORT (OUTPATIENT)
Dept: CARDIAC REHAB | Facility: CLINIC | Age: 80
End: 2025-05-29
Payer: MEDICARE

## 2025-05-29 ENCOUNTER — DOCUMENTATION (OUTPATIENT)
Dept: CARDIAC REHAB | Facility: CLINIC | Age: 80
End: 2025-05-29

## 2025-05-29 ENCOUNTER — PHARMACY VISIT (OUTPATIENT)
Dept: PHARMACY | Facility: CLINIC | Age: 80
End: 2025-05-29
Payer: COMMERCIAL

## 2025-05-29 DIAGNOSIS — I21.4 ACUTE MYOCARDIAL INFARCTION, SUBENDOCARDIAL INFARCTION, INITIAL EPISODE OF CARE (MULTI): ICD-10-CM

## 2025-05-29 PROCEDURE — 93798 PHYS/QHP OP CAR RHAB W/ECG: CPT | Performed by: INTERNAL MEDICINE

## 2025-05-29 NOTE — PROGRESS NOTES
Cardiac Rehabilitation 90 Day Progress Report    Name: Francisco Lizama  Medical Record Number: [id number]  YOB: 1945  Age: 79 y.o.    Today’s Date: 3/7/2025  Primary Care Physician: Joel Castaneda MD  Referring Physician: James Calix MD  Program Location: [address]     General  Primary Diagnosis:   1. NSTEMI (non-ST elevated myocardial infarction) (Multi)  Referral to Cardiac Rehab         Onset/Date of Diagnosis: 2/22/2025    Session #:  31    AACVPR Risk Stratification: Moderate     Falls Risk: Low  Psychosocial Assessment     Pre PHQ-9: 1  Sent PH-Q 9 to MD if score > 20: No; score < 20  Post PHQ_9: 0  Pt reported/currently experiencing stress: No  Patient uses stress management skills: No   History of: no history of anxiety or depression  Currently seeing a mental health provider: No  Social Support: Yes, Whom:wife  Learning Assessment:  Learning assessment/barriers: None  Preferred learning method: Visual and hands on  Barriers: None  Comments:    Stages of Change: Action     Psychosocial Plan    Goal Status: Met    Psychosocial Goals: Maintain or lower PH-Q 9 score by discharge    Psychosocial Interventions/Education:   3/7/25 reviewed initial PHQ-9 results w/ pt at eval/MS  3/26/2025  Effects of stress on the body, risks of unmanaged stress and techniques to minimize anxieties were all discussed in today's session. Handout was given for reference with resources to  Behavioral Health Services should patient need further assistance with stress management. Patient encouraged to notify staff if stressors increase and additional help is required.  Signature Mahesh Rosas RN    Nutrition Assessment:    Hyperlipidemia: Yes     Lipids:   Lab Results   Component Value Date    CHOL 187 02/22/2025    HDL 39.1 02/22/2025    LDLF 72 11/25/2022    TRIG 239 (H) 02/22/2025       Current Dietary Guidelines: Low fat, Low sodium  Barriers to dietary change: no    Diet Habit Survey: Picture  Your Plate  Pre: 70  Post: 73    Diabetes Assessment    Lab Results   Component Value Date    HGBA1C 5.9 (H) 03/19/2024       History of Diabetes: No    Weight Management    Height: 68 inches  Weight: 160 lbs  BMI: 24.33      Nutrition Plan    Goal Status: In Progress    Nutrition Goals: Improve Diet Habit Survey score by 5-10 points by discharge, Adapt a Mediterranean focused diet prior to discharge, and Learn how to read and interpret nutrition labels prior to discharge    Nutrition Interventions/Education:   4/2/2025  Label reading education was done with program's RDN during today's visit. Label Reading Highlights handout was given with information discussed during appointment and to assist in review of label contents at home.  Maco Cook RDN, LD  4/2/25 gave pt CR book, reviewed lipid profile/MS  5/15/2025  Education on sodium intake and consequences of high blood pressure was done during today's session. Discussed with patient the risks of excess levels of sodium and how to decrease dietary intake with provided list of substitutions. Handouts were given for home reference.  Maco Rosas RN  5/29/25 outcome remains in progress as post diet survey only improved by 3 pts/MS    Exercise Assessment    Home Exercise: Yes  Mode: Ld Chi  Frequency: 7 days/wk  Duration: 1-2 hrs    Exercise Prescription     Exercise Prescription based on: Duke Activity Status Index (DASI)    DASI Score: 21.45   MET Score: 5.4   Frequency:  3 days/week   Mode: Treadmill, NuStep, and Recumbent Cycle   Duration: 45 total aerobic minutes   Intensity: RPE 12-16  Target HR: 82-92  MET Level: 5.1  Patient wears supplemental O2: No     Modality Workload METs Duration (minutes)   1 Pre-Exercise   2:00   2 Treadmill 2.8 mph @ 5% 5.1 15 :00   3 NuStep  100 Lubin @ Load 8 3.9 15 :00   4 Recumbent Bike Load 5 @ 65 rpm  4.5 15 :00   5 Cooldown    2 :00   6 Post-Exercise   5:00     Resistance Training: No   Home  Exercise Prescription given: To be given prior to discharge from program.    Exercise Plan    Goal Status: Met    Exercise Goals: Increase exercise MET level by 5-10% each week, Increase total exercise duration to 30-45 minutes, Obtain 150 minutes/week of moderate intensity aerobic exercise, and Establish a home exercise program before discharge    Exercise Interventions/Education:   4/2/25 Adjusted THR/MS  4/23/2025  Stretching education was done with patient during today's rehab session. Demonstrations of stretches was done with patient, in addition to discussing the benefits of flexibility training. Patient was provided handout with examples of exercises and further instructions to be practiced at home. Patient encouraged to report to rehab staff with any questions or concerns.  Signature China Carmona MS, CCRP  5/1/2025  Cardiovascular changes with exercise were discussed in today's session. Both immediate and long term effects of exercise were covered and the importance of cooling down post workout was reviewed.  Signature Modesta Lang [facility]-EP  5/22/2025  Resistance training education was done. Resistance bands were measured and cut for patient to begin home strength training exercises. Frequency, intensity/RPE, and form were reviewed with educational handout given for further reference. Patient was encouraged to comeback with any questions.  Signature Mahesh Rosas RN    Other Core Components/Risk Factor Assessment:    Medication adherence  Current Medications:   Medication Documentation Review Audit       Reviewed by Mahesh Rosas RN (Registered Nurse) on 03/07/25 at 1426      Medication Order Taking? Sig Documenting Provider Last Dose Status   aspirin 81 mg chewable tablet [id number] Yes Chew 1 tablet (81 mg) once daily. CHERRY Pozo-CNP  Active    Patient not taking:   Discontinued 03/03/25 1330     Discontinued 03/03/25 1329   levothyroxine (Synthroid, Levoxyl) 50 mcg tablet  [id number] Yes Take 1 tablet (50 mcg) by mouth once every day. Tommy Corcoran MD  Active   pantoprazole (Protonix) 20 mg EC tablet [id number] Yes Take 1 tablet (20 mg) by mouth 2 times a day. Take before breakfast and before dinner Do not crush, chew, or split.   Patient taking differently: Take 1 tablet (20 mg) by mouth once daily. Take before breakfast and before dinner Do not crush, chew, or split.    Joel Castaneda MD  Active    Patient not taking:   Discontinued 03/03/25 1329   rosuvastatin (Crestor) 40 mg tablet [id number] Yes Take 1 tablet (40 mg) by mouth once daily at bedtime. PARTH Barajas DNP  Active    Patient not taking:   Discontinued 03/03/25 1329   spironolactone (Aldactone) 25 mg tablet [id number] Yes Take 1 tablet (25 mg) by mouth once daily. James Calix MD  Active   tamsulosin (Flomax) 0.4 mg 24 hr capsule [id number] Yes Take 1 capsule (0.4 mg) by mouth once daily. Tommy Corcoran MD  Active   ticagrelor (Brilinta) 90 mg tablet [id number] Yes Take 1 tablet (90 mg) by mouth 2 times a day. PARTH Barajas DNP  Active                                 Medication compliance: Yes   Uses pill box/organizer: Yes    Carries medication list: Yes MyChart    Blood Pressure Management  History of Hypertension: No   Medication Changes: No   Resting BP:  Visit Vitals  /66 (BP Location: Left arm, Patient Position: Sitting, BP Cuff Size: Small adult)   Pulse 63   Resp 14        Heart Failure Management  Hx of Heart Failure: No    Smoking/Tobacco Assessment  Social History     Tobacco Use   Smoking Status Never   Smokeless Tobacco Never       Other Core Component Plan    Goal Status: In Progress    Other Core Component Goals: Verbalize medication usage and drug actions by discharge and Achieve resting BP of < 130/80 by discharge    Other Core Component Interventions/Education:   3/19/2025  Coronary artery disease education was done during today's session. Signs and symptoms of  possible heart attack, risk factors for prevention, and lifestyle behavior modifications were all explained. Handout for personal use was given.    Signature China Carmona MS, CCRP  4/7/25 spoke with Dr Calix about Mr Lizama not feeling well. He was advised to take half tablet 12.5 mg of spironolactone and take his Flomax at night. He is also advised to drink more fluids/SD  4/9/2025  Discussion of prescribed drug names, doses, side effects, and medication uses was done with patient. Education handout, Medications Used for Cardiac Conditions was also given to the patient. Patient was instructed to come back with any questions.  Signature Vida Steve, RN  4/16/2025  Risk factors of cardiovascular disease were discussed today's. Education on modifiable and non-modifiable risk factors of CVD and recommendations to manage them was done today. Handouts were provided along with further reference. Patient was educated to return with further questions. Signature China Carmona MS, CCRP    Individual Patient Goals:    Get back to doing Ld Chi by midpoint.  Get back to volunteering at [facility] by midpoint.    Goal Status: Met    Staff Comments:  Mr. Lizama remains adherent to his cardiac rehab exercise sessions.  He continues to make good progress on his outcomes.  He has met both of his personal goals and several of the outcomes already.  He will soon graduate from the program.  No further cardiac complaints voiced.    Rehab Staff Signature: China Carmona MS, CCRP

## 2025-05-30 ENCOUNTER — CLINICAL SUPPORT (OUTPATIENT)
Dept: CARDIAC REHAB | Facility: CLINIC | Age: 80
End: 2025-05-30
Payer: MEDICARE

## 2025-05-30 DIAGNOSIS — I21.4 ACUTE MYOCARDIAL INFARCTION, SUBENDOCARDIAL INFARCTION, INITIAL EPISODE OF CARE (MULTI): ICD-10-CM

## 2025-05-30 PROCEDURE — 93798 PHYS/QHP OP CAR RHAB W/ECG: CPT | Performed by: INTERNAL MEDICINE

## 2025-06-02 ENCOUNTER — CLINICAL SUPPORT (OUTPATIENT)
Dept: CARDIAC REHAB | Facility: CLINIC | Age: 80
End: 2025-06-02
Payer: MEDICARE

## 2025-06-02 DIAGNOSIS — I21.4 ACUTE MYOCARDIAL INFARCTION, SUBENDOCARDIAL INFARCTION, INITIAL EPISODE OF CARE (MULTI): ICD-10-CM

## 2025-06-02 PROCEDURE — 93798 PHYS/QHP OP CAR RHAB W/ECG: CPT | Performed by: INTERNAL MEDICINE

## 2025-06-04 ENCOUNTER — APPOINTMENT (OUTPATIENT)
Dept: CARDIAC REHAB | Facility: CLINIC | Age: 80
End: 2025-06-04
Payer: MEDICARE

## 2025-06-05 ENCOUNTER — CLINICAL SUPPORT (OUTPATIENT)
Dept: CARDIAC REHAB | Facility: CLINIC | Age: 80
End: 2025-06-05
Payer: MEDICARE

## 2025-06-05 ENCOUNTER — DOCUMENTATION (OUTPATIENT)
Dept: CARDIAC REHAB | Facility: CLINIC | Age: 80
End: 2025-06-05

## 2025-06-05 DIAGNOSIS — I21.4 ACUTE MYOCARDIAL INFARCTION, SUBENDOCARDIAL INFARCTION, INITIAL EPISODE OF CARE (MULTI): ICD-10-CM

## 2025-06-05 PROCEDURE — 93798 PHYS/QHP OP CAR RHAB W/ECG: CPT | Performed by: INTERNAL MEDICINE

## 2025-06-05 NOTE — PROGRESS NOTES
Cardiac Rehabilitation Discharge Report    Name: Francisco Lizama  Medical Record Number: [id number]  YOB: 1945  Age: 79 y.o.    Today’s Date: 3/7/2025  Primary Care Physician: Joel Castaneda MD  Referring Physician: James Calix MD  Program Location: [address]     General  Primary Diagnosis:   1. NSTEMI (non-ST elevated myocardial infarction) (Multi)  Referral to Cardiac Rehab         Onset/Date of Diagnosis: 2/22/2025    Session #:  35    AACVPR Risk Stratification: Moderate     Falls Risk: Low  Psychosocial Assessment     Pre PHQ-9: 1  Sent PH-Q 9 to MD if score > 20: No; score < 20  Post PHQ_9: 2  Pt reported/currently experiencing stress: No  Patient uses stress management skills: No   History of: no history of anxiety or depression  Currently seeing a mental health provider: No  Social Support: Yes, Whom:wife  Learning Assessment:  Learning assessment/barriers: None  Preferred learning method: Visual and hands on  Barriers: None  Comments:    Stages of Change: maintenance     Psychosocial Plan    Goal Status: In Progress    Psychosocial Goals: Maintain or lower PH-Q 9 score by discharge    Psychosocial Interventions/Education:   3/7/25 reviewed initial PHQ-9 results w/ pt at al/MS  3/26/2025  Effects of stress on the body, risks of unmanaged stress and techniques to minimize anxieties were all discussed in today's session. Handout was given for reference with resources to  [facility]al [facility] Services should patient need further assistance with stress management. Patient encouraged to notify staff if stressors increase and additional help is required.  Signature Mahesh Rosas RN  6/5/25 outcome in progress as post PHQ-9 increased by 1 pt/MS    Nutrition Assessment:    Hyperlipidemia: Yes     Lipids:   Lab Results   Component Value Date    CHOL 187 02/22/2025    HDL 39.1 02/22/2025    LDLF 72 11/25/2022    TRIG 239 (H) 02/22/2025       Current Dietary Guidelines: Low fat, Low  sodium  Barriers to dietary change: no    Diet Habit Survey: Picture Your Plate  Pre: 70  Post: 73    Diabetes Assessment    Lab Results   Component Value Date    HGBA1C 5.9 (H) 03/19/2024       History of Diabetes: No    Weight Management    Height: 68 inches  Weight: 160 lbs  BMI: 24.33      Nutrition Plan    Goal Status: In Progress    Nutrition Goals: Improve Diet Habit Survey score by 5-10 points by discharge, Adapt a Mediterranean focused diet prior to discharge, and Learn how to read and interpret nutrition labels prior to discharge    Nutrition Interventions/Education:   4/2/2025  Label reading education was done with program's RDN during today's visit. Label Reading Highlights handout was given with information discussed during appointment and to assist in review of label contents at home.  Signature Fide Cook RDN, LD  4/2/25 gave pt CR book, reviewed lipid profile/MS  5/15/2025  Education on sodium intake and consequences of high blood pressure was done during today's session. Discussed with patient the risks of excess levels of sodium and how to decrease dietary intake with provided list of substitutions. Handouts were given for home reference.  Signature Mahesh Rosas RN  5/29/25 outcome remains in progress as post diet survey only improved by 3 pts/MS    Exercise Assessment    Home Exercise: Yes  Mode: [facility]  Frequency: 7 days/wk  Duration: 1-2 hrs    Exercise Prescription     Exercise Prescription based on: Duke Activity Status Index (DASI)    DASI Score: 21.45   MET Score: 5.4   Frequency:  3 days/week   Mode: Treadmill, NuStep, and Recumbent Cycle   Duration: 45 total aerobic minutes   Intensity: RPE 12-16  Target HR: 82-92  MET Level: 5.1  Patient wears supplemental O2: No     Modality Workload METs Duration (minutes)   1 Pre-Exercise   2:00   2 Treadmill 2.8 mph @ 5% 5.1 15 :00   3 NuStep  100 [place] @ Load 8 3.9 15 :00   4 Recumbent Bike Load 5 @ 65 rpm  4.5 15 :00   5 Cooldown    2  :00   6 Post-Exercise   5:00     Resistance Training: No   Home Exercise Prescription given: To be given prior to discharge from program.    Exercise Plan    Goal Status: Met    Exercise Goals: Increase exercise MET level by 5-10% each week, Increase total exercise duration to 30-45 minutes, Obtain 150 minutes/week of moderate intensity aerobic exercise, and Establish a home exercise program before discharge    Exercise Interventions/Education:   4/2/25 Adjusted THR/MS  4/23/2025  Stretching education was done with patient during today's rehab session. Demonstrations of stretches was done with patient, in addition to discussing the benefits of flexibility training. Patient was provided handout with examples of exercises and further instructions to be practiced at home. Patient encouraged to report to rehab staff with any questions or concerns.  Signature China Carmona MS, CCRP  5/1/2025  Cardiovascular changes with exercise were discussed in today's session. Both immediate and long term effects of exercise were covered and the importance of cooling down post workout was reviewed.  Signature Modesta Lang [facility]-EP  5/22/2025  Resistance training education was done. Resistance bands were measured and cut for patient to begin home strength training exercises. Frequency, intensity/RPE, and form were reviewed with educational handout given for further reference. Patient was encouraged to comeback with any questions.  Signature Mahesh Rosas RN    Other Core Components/Risk Factor Assessment:    Medication adherence  Current Medications:   Medication Documentation Review Audit       Reviewed by Mahesh Rosas RN (Registered Nurse) on 03/07/25 at 1426      Medication Order Taking? Sig Documenting Provider Last Dose Status   aspirin 81 mg chewable tablet [id number] Yes Chew 1 tablet (81 mg) once daily. CHERRY Pozo-CNP  Active    Patient not taking:   Discontinued 03/03/25 1330     Discontinued 03/03/25  1329   levothyroxine (Synthroid, Levoxyl) 50 mcg tablet [id number] Yes Take 1 tablet (50 mcg) by mouth once every day. Tommy Corcoran MD  Active   pantoprazole (Protonix) 20 mg EC tablet [id number] Yes Take 1 tablet (20 mg) by mouth 2 times a day. Take before breakfast and before dinner Do not crush, chew, or split.   Patient taking differently: Take 1 tablet (20 mg) by mouth once daily. Take before breakfast and before dinner Do not crush, chew, or split.    Joel Castaneda MD  Active    Patient not taking:   Discontinued 03/03/25 1329   rosuvastatin (Crestor) 40 mg tablet [id number] Yes Take 1 tablet (40 mg) by mouth once daily at bedtime. PARTH Barajas DNP  Active    Patient not taking:   Discontinued 03/03/25 1329   spironolactone (Aldactone) 25 mg tablet [id number] Yes Take 1 tablet (25 mg) by mouth once daily. James Calix MD  Active   tamsulosin (Flomax) 0.4 mg 24 hr capsule [id number] Yes Take 1 capsule (0.4 mg) by mouth once daily. Tommy Corcoran MD  Active   ticagrelor (Brilinta) 90 mg tablet [id number] Yes Take 1 tablet (90 mg) by mouth 2 times a day. PARTH Barajas, TROY  Active                                 Medication compliance: Yes   Uses pill box/organizer: Yes    Carries medication list: Yes MyChart    Blood Pressure Management  History of Hypertension: No   Medication Changes: No   Resting BP:  Visit Vitals  /66 (BP Location: Left arm, Patient Position: Sitting, BP Cuff Size: Small adult)   Pulse 63   Resp 14        Heart Failure Management  Hx of Heart Failure: No    Smoking/Tobacco Assessment  Social History     Tobacco Use   Smoking Status Never   Smokeless Tobacco Never       Other Core Component Plan    Goal Status: In Progress    Other Core Component Goals: Verbalize medication usage and drug actions by discharge and Achieve resting BP of < 130/80 by discharge    Other Core Component Interventions/Education:   3/19/2025  Coronary artery disease  education was done during today's session. Signs and symptoms of possible heart attack, risk factors for prevention, and lifestyle behavior modifications were all explained. Handout for personal use was given.    Signature China Carmona MS, CCRP  4/7/25 spoke with Dr Calix about Mr Lizama not feeling well. He was advised to take half tablet 12.5 mg of spironolactone and take his Flomax at night. He is also advised to drink more fluids/SD  4/9/2025  Discussion of prescribed drug names, doses, side effects, and medication uses was done with patient. Education handout, Medications Used for Cardiac Conditions was also given to the patient. Patient was instructed to come back with any questions.  Signature Vida Steve RN  4/16/2025  Risk factors of cardiovascular disease were discussed today's. Education on modifiable and non-modifiable risk factors of CVD and recommendations to manage them was done today. Handouts were provided along with further reference. Patient was educated to return with further questions. Signature China Carmona MS, CCRP    Individual Patient Goals:    Get back to doing L[facility] Chi by midpoint.  Get back to volunteering at [facility] by midpoint.    Goal Status: Met    Staff Comments:  Mr. Lizama has completed the Phase 2 Cardiac Rehab Program .  He has met both of his personal goals and several of the outcomes.  He increased his exercise duration by 66% and his exercise capacity by 89%.  He plans to maintain his exercise program at a local fitness center. No cardiac complaints voiced.    Rehab Staff Signature: China Carmona MS, CCRP    s

## 2025-06-05 NOTE — PATIENT INSTRUCTIONS
Cardiac Rehabilitation: Home Exercise Handout    The American College of Sports Medicine and American Heart Association recommends engaging in aerobic exercise 5 to 7 times per week, and staying within your target heart rate range for a minimum of 30 to 60 minutes per session. The following guidelines and components of your home exercise program are similar to those in your Cardiac Rehabilitation program.     The following are recommendations for your exercise routine based on the FITT principle:    Frequency:   5 to 7 days per week    Intensity:   Target Heart Rate = 82-92     RPE = 12 - 16    Time:   30+ minutes per session; 150 total minutes per week    Type:   Aerobic exercise    Specific Recommendations: continue your exercise routine      Warm Up  It is important to start off slow to give your body a change to get used to the increased workload and transition from rest to exercise. Your warm up should consist of a slow paced activity for 2-5 minutes.    Example:  2 - 5 minutes of walking or marching in place     Aerobic Exercises   (Cardio)   If you have home exercise equipment, this is where you can use it! If not, there are still lots of ways to be active at home or outside. The minimum time for aerobic exercise is 10 minutes, building up to 30 - 60 minutes.    Examples: walking, cycling, or dancing     Resistance Training   (Strength, Free Weights, Resistance Bands, Body)   Body weight or resistance exercises can be done at minimum 2 times per week and are important to build up your physical strength and endurance. Your resistance routine should include 6 to 10 different exercises that can be done 10-15 times (repetitions) repeating 1-2 sets. Spend 15 - 20 minutes on these exercises.     Examples: See suggestions from resistance training handout or ask staff       Cool Down   It is important to allow your heart rate and blood pressure to gradually recover from the exercise training phase. The cool-down  "period should be about 5 minutes in total and be incorporated with flexibility or stretching exercises.    Examples: See suggestions from stretching handout      If you're not using a target heart rate, you can ask yourself \"How difficult is the work I am doing?\" By using the RPE scale attached, work should not be too light or too hard. Aim to work at the 12 - 15 level on this scale. You can also check in on your shortness of breath using the RPD scale, where you should be at or below a 3 - Moderate shortness of breath while exercising.                        If you have any additional questions about your home exercise plan, feel free to contact your  at rehabilitation.     Rehab Staff Signed: Vida Steve RN   "

## 2025-06-05 NOTE — PROGRESS NOTES
Phase II Cardiac Rehabilitation Performance Measure Report    Patient Name: Francisco Lizama   : 1945   MRN: [id number]   : Vida Steve RN  Start Date: 3/14/25  End Date: 25  # Sessions Completed: 36     Patient's Stated Goals:   Return to [facility] by D/C  Achieved: Yes    Return to volunteering by D/C  Achieved: Yes  Comments: Good job!! Patient is advised to continue achieving 150 minutes/week or more of aerobic exercise and to progress the exercise intensity as well as duration according to the exercise handout.      Program Outcome Goals:    Tobacco Cessation  [Social History]     [Social History]  Tobacco Use    Smoking status: Never    Smokeless tobacco: Never   Vaping Use    Vaping status: Never Used   Substance Use Topics    Alcohol use: Yes     Alcohol/week: 1.0 standard drink of alcohol     Types: 1 Standard drinks or equivalent per week     Comment: 1/2 beer occasionally/socially    Drug use: Not Currently     Achieved: Not Applicable  Comments/Long-Term Goals:    Lipids  Lab Results   Component Value Date    CHOL 187 2025    CHOL 142 2024    HDL 39.1 2025    HDL 37.4 2024    LDLCALC 100 (H) 2025    LDLCALC 75 2024    TRIG 239 (H) 2025    TRIG 147 2024     Achieved: In Progress  Comments/Long-Term Goals: Patient was advised to read labels for the grocery and food items as suggested by program dietician.     Physical Activity  Duration   Pre: 27 minutes   Post: 45 minutes   % Change: 66  METS (Intensity)   Pre: 2.7   Post: 5.1   % Change: 89    Achieved: Yes  Comments/Long-Term Goals: Good job!! Patient is advised to continue achieving 150 minutes/week or more of aerobic exercise and to progress the exercise intensity as well as duration according to the exercise handout.      Diabetes     Lab Results   Component Value Date    HGBA1C 5.9 (H) 2024      Achieved: Not Applicable  Comments/Long-Term Goals: continue with a heart healthy  diet    Nutrition:   Picture Your Plate  Pre: 70  Post: 73    Achieved: Yes  Comments/Long-Term Goals: Patient was advised to continue progressing towards heart healthy diet as program dietician.     Psychosocial:   PHQ-9  Pre: 1  Post: 0    Achieved: Yes  Comments/Long-Term Goals: Patient was advised to continue managing stress and using stress management skills.     Rehab Staff Signature: Vida Steve RN  Date: 6/5/2025

## 2025-06-06 ENCOUNTER — APPOINTMENT (OUTPATIENT)
Dept: CARDIAC REHAB | Facility: CLINIC | Age: 80
End: 2025-06-06
Payer: MEDICARE

## 2025-06-09 ENCOUNTER — APPOINTMENT (OUTPATIENT)
Dept: CARDIAC REHAB | Facility: CLINIC | Age: 80
End: 2025-06-09
Payer: MEDICARE

## 2025-06-11 ENCOUNTER — APPOINTMENT (OUTPATIENT)
Dept: CARDIAC REHAB | Facility: CLINIC | Age: 80
End: 2025-06-11
Payer: MEDICARE

## 2025-06-13 ENCOUNTER — APPOINTMENT (OUTPATIENT)
Dept: CARDIAC REHAB | Facility: CLINIC | Age: 80
End: 2025-06-13
Payer: MEDICARE

## 2025-06-16 ENCOUNTER — OFFICE VISIT (OUTPATIENT)
Dept: CARDIOLOGY | Facility: CLINIC | Age: 80
End: 2025-06-16
Payer: MEDICARE

## 2025-06-16 VITALS
BODY MASS INDEX: 24.02 KG/M2 | HEIGHT: 68 IN | HEART RATE: 70 BPM | OXYGEN SATURATION: 97 % | SYSTOLIC BLOOD PRESSURE: 102 MMHG | DIASTOLIC BLOOD PRESSURE: 62 MMHG | WEIGHT: 158.5 LBS

## 2025-06-16 DIAGNOSIS — I25.10 CAD IN NATIVE ARTERY: ICD-10-CM

## 2025-06-16 DIAGNOSIS — R07.89 ATYPICAL CHEST PAIN: ICD-10-CM

## 2025-06-16 DIAGNOSIS — I21.4 NSTEMI (NON-ST ELEVATED MYOCARDIAL INFARCTION) (MULTI): Primary | ICD-10-CM

## 2025-06-16 DIAGNOSIS — E03.9 HYPOTHYROIDISM, UNSPECIFIED TYPE: ICD-10-CM

## 2025-06-16 PROCEDURE — G2211 COMPLEX E/M VISIT ADD ON: HCPCS | Performed by: STUDENT IN AN ORGANIZED HEALTH CARE EDUCATION/TRAINING PROGRAM

## 2025-06-16 PROCEDURE — 1125F AMNT PAIN NOTED PAIN PRSNT: CPT | Performed by: STUDENT IN AN ORGANIZED HEALTH CARE EDUCATION/TRAINING PROGRAM

## 2025-06-16 PROCEDURE — 99212 OFFICE O/P EST SF 10 MIN: CPT

## 2025-06-16 PROCEDURE — 99215 OFFICE O/P EST HI 40 MIN: CPT | Performed by: STUDENT IN AN ORGANIZED HEALTH CARE EDUCATION/TRAINING PROGRAM

## 2025-06-16 PROCEDURE — 1159F MED LIST DOCD IN RCRD: CPT | Performed by: STUDENT IN AN ORGANIZED HEALTH CARE EDUCATION/TRAINING PROGRAM

## 2025-06-16 PROCEDURE — 1160F RVW MEDS BY RX/DR IN RCRD: CPT | Performed by: STUDENT IN AN ORGANIZED HEALTH CARE EDUCATION/TRAINING PROGRAM

## 2025-06-16 RX ORDER — ASPIRIN 81 MG/1
81 TABLET ORAL DAILY
COMMUNITY

## 2025-06-16 ASSESSMENT — ENCOUNTER SYMPTOMS
OCCASIONAL FEELINGS OF UNSTEADINESS: 0
DEPRESSION: 0
LOSS OF SENSATION IN FEET: 1

## 2025-06-16 ASSESSMENT — PAIN SCALES - GENERAL: PAINLEVEL_OUTOF10: 4

## 2025-06-16 ASSESSMENT — COLUMBIA-SUICIDE SEVERITY RATING SCALE - C-SSRS
1. IN THE PAST MONTH, HAVE YOU WISHED YOU WERE DEAD OR WISHED YOU COULD GO TO SLEEP AND NOT WAKE UP?: NO
6. HAVE YOU EVER DONE ANYTHING, STARTED TO DO ANYTHING, OR PREPARED TO DO ANYTHING TO END YOUR LIFE?: NO
2. HAVE YOU ACTUALLY HAD ANY THOUGHTS OF KILLING YOURSELF?: NO

## 2025-06-16 NOTE — PATIENT INSTRUCTIONS
Dear Francisco Lizama,    It was a pleasure meeting you today at the Cardiology office. As we dicussed, your clinical condition is atypical chest pain, hyperlipidemia and post stent treatment. I recommended a lipid panel, CMP, continuing Plavix for 12 months and aspirin for life. Also, there is an order for a stress nuclear stress to complete in the next months. I will contact you once your results are available to give you my impressions through [employer].    Sincerely,     James Calix MD

## 2025-06-16 NOTE — PROGRESS NOTES
Location of visit: [address]   Type of Visit: Established - First Seen: 4/14/2025     Chief Complaint:  Patient was referred to Cardiology for follow-up.    History Of Present Illness:    Francisco Lizama is a 80 y.o. male, with history significant for CAD, Hypertension, Hyperlipidemia, Hypothyroidism, BPH, who visits Cardiology today as a follow up visit. He was admitted on 2/22/2025 through 2/25/2025 due to NSTEMI with mid RCA disease. There was also 80% disease of a non-dominant mid LCx-OM2. He underwent RCA PTCA with a 3.5x32 mm Synergy TORIE. TTE pre PCI showed normal LVEF and no regional wall motion abnormalities. BNP was mildly elevated. With some dyspnea and chest pain post procedure which resolved after switching Brilinta to Plavix. He completed cardiac rehab without any issues. He has occasional atypical chest pain during exercise.    Patient dyspnea on exertion, shortness of breath, orthopnea, PND, edema, palpitations, dizziness, lightheadedness, syncope, claudication.    Blood pressure: 102/62 mmHg  HR: 70 bpm    Past Medical History:  He has a past medical history of Acquired hypothyroidism, BPH (benign prostatic hyperplasia), Gout, Kidney stone, Mixed hyperlipidemia, Paroxysmal atrial fibrillation (Multi), Raynaud's syndrome without gangrene, and Scrotal mass.    Past Surgical History:  He has a past surgical history that includes Tonsillectomy and Cardiac catheterization (N/A, 2/24/2025).    Social History:  He reports that he has never smoked. He has never used smokeless tobacco. He reports current alcohol use of about 1.0 standard drink of alcohol per week. He reports that he does not currently use drugs.    Family History:  Family History[1]    Allergies:  Patient has no known allergies.    Outpatient Medications:  Current Outpatient Medications   Medication Instructions    clopidogrel (PLAVIX) 75 mg, oral, Daily, Take 4 tablets (300 mg) of Plavix the first time you take the medication instead of  "Brilinta    coenzyme Q-10 (CO Q-10) 100 mg, Daily    levothyroxine (SYNTHROID, LEVOXYL) 50 mcg, oral, Every Day    rosuvastatin (CRESTOR) 40 mg, oral, Nightly    spironolactone (ALDACTONE) 25 mg, oral, Daily    tamsulosin (FLOMAX) 0.4 mg, oral, Daily     Last Recorded Vitals:  Vitals:    06/16/25 0900   BP: 102/62   BP Location: Right arm   Patient Position: Sitting   BP Cuff Size: Large adult   Pulse: 70   SpO2: 97%   Weight: 71.9 kg (158 lb 8 oz)   Height: 1.727 m (5' 8\")       Physical Exam:      6/16/2025     9:00 AM 4/14/2025    11:24 AM 3/7/2025     2:15 PM 3/6/2025     8:57 AM 3/3/2025     1:15 PM 2/25/2025     3:57 PM   Vitals   Systolic 102 114 126 121 132 144   Diastolic 62 70 66 73 75 78   BP Location Right arm Left arm Left arm  Right arm Left arm   Heart Rate 70 54 63 60 59 78   Temp      36.7 °C (98 °F)   Resp   14 12 16   Height 1.727 m (5' 8\") 1.727 m (5' 8\") 1.727 m (5' 8\")  1.727 m (5' 8\")    Weight (lb) 158.5  160  160    BMI 24.1 kg/m2 24.33 kg/m2 24.33 kg/m2  24.33 kg/m2    BSA (m2) 1.86 m2 1.87 m2 1.87 m2  1.87 m2    Visit Report Report Report  Report Report      Wt Readings from Last 5 Encounters:   06/16/25 71.9 kg (158 lb 8 oz)   03/07/25 72.6 kg (160 lb)   03/03/25 72.6 kg (160 lb)   02/24/25 71.7 kg (158 lb 1.1 oz)   09/24/24 73.5 kg (162 lb)     General: Sitting up comfortably in chair; in no apparent distress.  HEENT: Normocephalic; atraumatic. Well hydrated.  Eyes: Anicteric sclera. Extraocular movement intact.  Neck: Supple; no thyromegaly; normal jugular venous pressure, no bruits.  Respiratory: Bilateral air entry equal. No wheezing.  Cardiovascular: Normal S1, S2; no murmurs auscultated.  Abdomen: Nondistended; nontender. (+) bowel sounds.  Extremities: No peripheral edema present. Pulses 2+ diffusely.  Neurological: Oriented to time, place, and person; nonfocal.  Psychiatric: Normal affect.     Last Labs Reviewed:  CBC -  Recent Labs     02/25/25  0546 02/24/25  0629 " 02/22/25  1047 05/23/24 2222 03/20/24  0433   WBC 7.6 7.3 7.6 6.5 5.3   HGB 14.6 14.1 16.2 15.3 14.1   HCT 42.2 41.4 46.6 44.8 42.2   * 143* 129* 178 169   MCV 88 89 87 89 88     CMP -  Recent Labs     02/25/25  0546 02/23/25  0629 02/22/25  1047 10/18/24  1258 05/23/24 2222 03/20/24  0433 03/19/24  1215    139 138 140 138 139  --    K 4.6 4.3 4.5 4.4 4.2 4.4  --     105 106 107 103 105  --    CO2 23 27 26 25 24 27  --    ANIONGAP 12 11 11 12 15 11  --    BUN 13 17 19 16 22 15  --    CREATININE 0.97 0.99 0.97 0.96 1.01 1.02  --    EGFR 80 78 80 81 76 76  --    MG  --   --   --   --   --  2.00 2.20   CALCIUM 9.1 9.1 9.6 9.9 9.5 9.0  --      Recent Labs     02/23/25  0629 02/22/25  1047 05/23/24 2222 03/19/24  1050 11/30/23  0858   ALBUMIN 4.2 4.5 4.5 4.9 4.6   ALKPHOS 60 65 72 70 58   ALT 19 17 23 15 18   AST 23 26 24 18 17   BILITOT 1.3* 1.3* 1.2 1.4* 1.5*   LIPASE  --  11 21  --   --      LIPID PANEL -   Recent Labs     02/22/25  1148 03/20/24  0433 11/30/23  0858 11/25/22  1543 10/05/21  0840 12/13/19  1146   CHOL 187 142 152 174 160 146   LDLF  --   --   --  72 87 77   LDLCALC 100* 75 79  --   --   --    HDL 39.1 37.4 44.0 38.4* 41.3 40.7   TRIG 239* 147 145 318* 160* 142     COAGULATION PANEL -  Recent Labs     02/23/25  0629 02/23/25  0211 02/22/25 2227 02/22/25  1823 03/19/24  1306 01/19/20  1740   INR  --   --   --   --   --  1.0   HAUF 0.4 0.4 0.4   < >  --   --    DDIMERVTE  --   --   --   --  321 <215    < > = values in this interval not displayed.     HEME/ENDO -  Recent Labs     02/22/25  1148 03/19/24  1215 03/19/24  1050 11/30/23  0858 11/25/22  1543 01/19/20  1740 12/13/19  1146 08/24/18  0828   TSH 3.35 3.14  --  2.62 3.05   < > 1.85 1.83   HGBA1C  --   --  5.9*  --   --   --   --   --    FREET4  --   --   --   --   --   --  1.05 0.89    < > = values in this interval not displayed.     CARDIOVASCULAR  Recent Labs     02/25/25  0546 02/24/25  0629 02/23/25  1556 02/23/25  0629  02/22/25  1823 02/22/25  1148 05/23/24  2321 05/23/24  2222 03/19/24  1215 03/19/24  1050 03/19/24  1050 01/20/20  0508 01/19/20  1740   TROPHS  --  1,266* 676* 821* 1,013* 637*   < > 4 4   < > 3  --   --    *  --   --   --   --   --   --  25  --   --  35 19 19   CRP  --   --   --   --   --   --   --   --  0.13  --   --   --   --     < > = values in this interval not displayed.     Last Cardiology/Imaging Tests Personally Reviewed (if images available) and Interpreted:  ECG:  Encounter Date: 03/03/25   ECG 12 Lead   Result Value    Ventricular Rate 54    Atrial Rate 54    HI Interval 208    QRS Duration 82    QT Interval 394    QTC Calculation(Bazett) 373    P Axis 39    R Axis 20    T Axis -23    QRS Count 9    Q Onset 220    P Onset 116    P Offset 175    T Offset 417    QTC Fredericia 380    Narrative    Sinus bradycardia  T wave abnormality, consider inferior ischemia     Echocardiogram:  Recent Labs     03/13/25  1345 02/22/25  1507 03/20/24  1207   EF 63 58 70   LVIDD 4.09 3.65 3.75   RV 21.8 26.6 20.5   RVFRWALLPKSP 10.00 13.00 12.00   TAPSE 1.7  --  2.8     Transthoracic Echo (TTE) Complete 02/22/2025   1. The left ventricular systolic function is normal, with a visually estimated ejection fraction of 55-60%.   2. Spectral Doppler shows a Grade I (impaired relaxation pattern) of left ventricular diastolic filling with normal left atrial filling pressure.   3. Right ventricular systolic pressure is within normal limits.   4. Mild aortic valve regurgitation.    Cath:  Left Heart Cath with Coronary Angiography and LV 02/24/2025  1. Coronary Lesion Summary:  LAD    50% stenosis    proximal  OM 2   80% stenosis      mid  RCA    99% stenosis      mid  2. Successful OCT guided PCI of the mid RCA with a 3.5 x 32 mm Synergy drug-eluting stent postdilated up to 3.75 mm.  3. 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  4. Moderate CAD of the LAD.  5. Elevated LVEDP.  6. No evidence of aortic stenosis.      Stress Test:  MR cardiac w and wo IV contrast w regadenoson stress for MORPH FUNCT and valve DZ 05/16/2024  1. Normal LV size and systolic function. Quantitative LVEF 69%.   2. No evidence of inducible myocardial ischemia using regadenoson  stress perfusion imaging.   3. No evidence of myocardial fibrosis, infiltration or infarction based on LGE imaging.   4. Normal RV size and systolic function. Quantitative RVEF 53%.     CV RISK FACTORS:   # Hypertension: Last BP: 102/62.  # Hyperlipidemia: Last Tchol 187 / LDL No results found for requested labs within last 365 days. / HDL 39.1 / TRIG 239 (2/22/2025: 11:48 AM).  # Type II Diabetes Mellitus: Last A1c No results found for requested labs within last 365 days. (No results in last year.).  # Obesity: Last BMI: 24.11.  # CKD: Last BUN/Cr (GFR): 13/0.97 (80), 2/25/2025:  5:46 AM.    ASCV RISK:  The ASCVD Risk score (Mukul GHOSH, et al., 2019) failed to calculate for the following reasons:    Risk score cannot be calculated because patient has a medical history suggesting prior/existing ASCVD    Assessment:  80 y.o. male, with history significant for CAD s/p NSTEMI 2/2025 with mid RCA PCI and untreated 80% mid LCx-OM2 (non-dominant), Hypertension, Hyperlipidemia, Hypothyroidism, BPH, who visits Cardiology today as a follow up visit. Doing well post rehab in terms of exercise capacity but with episodes of atypical chest pain while exercising.  Assessment & Plan  NSTEMI (non-ST elevated myocardial infarction) (Multi)  - Complete 12 months of Plavix and then continue with single antiplatelet for life  - Recheck BNP due to HF symptoms post ACS  CAD in native artery  - Continue high dose Crestor and check lipid panel and CMP for LDL <70 ( at the time of NSTEMI)  Atypical chest pain  - Nuclear stress test to assess presence of ischemia related to OM2    I will contact the patient once the results are available through [employer]  I spent 40 minutes assessing the case  between pre-charting, face-to-face patient interaction, and documentation    James Calix MD         [1]   Family History  Problem Relation Name Age of Onset    Other (lung fibrosis) Mother      Other (CABG) Father      No Known Problems Brother      No Known Problems Daughter

## 2025-06-17 NOTE — ASSESSMENT & PLAN NOTE
- Complete 12 months of Plavix and then continue with single antiplatelet for life  - Recheck BNP due to HF symptoms post ACS

## 2025-06-18 RX ORDER — LEVOTHYROXINE SODIUM 50 UG/1
50 TABLET ORAL DAILY
Qty: 100 TABLET | Refills: 2 | Status: SHIPPED | OUTPATIENT
Start: 2025-06-18

## 2025-06-24 ENCOUNTER — APPOINTMENT (OUTPATIENT)
Dept: PRIMARY CARE | Facility: CLINIC | Age: 80
End: 2025-06-24
Payer: MEDICARE

## 2025-06-25 ENCOUNTER — HOSPITAL ENCOUNTER (OUTPATIENT)
Dept: RADIOLOGY | Facility: CLINIC | Age: 80
Discharge: HOME | End: 2025-06-25
Payer: MEDICARE

## 2025-06-25 ENCOUNTER — PHARMACY VISIT (OUTPATIENT)
Dept: PHARMACY | Facility: CLINIC | Age: 80
End: 2025-06-25
Payer: COMMERCIAL

## 2025-06-25 ENCOUNTER — HOSPITAL ENCOUNTER (OUTPATIENT)
Dept: CARDIOLOGY | Facility: CLINIC | Age: 80
Discharge: HOME | End: 2025-06-25
Payer: MEDICARE

## 2025-06-25 DIAGNOSIS — R07.89 ATYPICAL CHEST PAIN: ICD-10-CM

## 2025-06-25 DIAGNOSIS — I25.10 CAD IN NATIVE ARTERY: ICD-10-CM

## 2025-06-25 PROCEDURE — 3430000001 HC RX 343 DIAGNOSTIC RADIOPHARMACEUTICALS: Performed by: STUDENT IN AN ORGANIZED HEALTH CARE EDUCATION/TRAINING PROGRAM

## 2025-06-25 PROCEDURE — 93016 CV STRESS TEST SUPVJ ONLY: CPT | Performed by: INTERNAL MEDICINE

## 2025-06-25 PROCEDURE — A9502 TC99M TETROFOSMIN: HCPCS | Performed by: STUDENT IN AN ORGANIZED HEALTH CARE EDUCATION/TRAINING PROGRAM

## 2025-06-25 PROCEDURE — 93018 CV STRESS TEST I&R ONLY: CPT | Performed by: INTERNAL MEDICINE

## 2025-06-25 PROCEDURE — RXMED WILLOW AMBULATORY MEDICATION CHARGE

## 2025-06-25 RX ADMIN — TETROFOSMIN 9.8 MILLICURIE: 0.23 INJECTION, POWDER, LYOPHILIZED, FOR SOLUTION INTRAVENOUS at 10:28

## 2025-06-25 RX ADMIN — TETROFOSMIN 30 MILLICURIE: 0.23 INJECTION, POWDER, LYOPHILIZED, FOR SOLUTION INTRAVENOUS at 11:00

## 2025-07-18 ENCOUNTER — APPOINTMENT (OUTPATIENT)
Dept: PRIMARY CARE | Facility: CLINIC | Age: 80
End: 2025-07-18
Payer: MEDICARE

## 2025-07-18 VITALS
WEIGHT: 158 LBS | HEIGHT: 68 IN | HEART RATE: 72 BPM | BODY MASS INDEX: 23.95 KG/M2 | RESPIRATION RATE: 16 BRPM | DIASTOLIC BLOOD PRESSURE: 73 MMHG | SYSTOLIC BLOOD PRESSURE: 123 MMHG | OXYGEN SATURATION: 98 %

## 2025-07-18 DIAGNOSIS — E03.9 ACQUIRED HYPOTHYROIDISM: ICD-10-CM

## 2025-07-18 DIAGNOSIS — M10.9 GOUT, UNSPECIFIED CAUSE, UNSPECIFIED CHRONICITY, UNSPECIFIED SITE: ICD-10-CM

## 2025-07-18 DIAGNOSIS — N52.9 ERECTILE DYSFUNCTION, UNSPECIFIED ERECTILE DYSFUNCTION TYPE: ICD-10-CM

## 2025-07-18 DIAGNOSIS — Z00.00 HEALTH CARE MAINTENANCE: Primary | ICD-10-CM

## 2025-07-18 DIAGNOSIS — K21.9 GASTROESOPHAGEAL REFLUX DISEASE WITHOUT ESOPHAGITIS: ICD-10-CM

## 2025-07-18 DIAGNOSIS — E78.2 MIXED HYPERLIPIDEMIA: ICD-10-CM

## 2025-07-18 ASSESSMENT — ENCOUNTER SYMPTOMS
OCCASIONAL FEELINGS OF UNSTEADINESS: 0
DEPRESSION: 0
LOSS OF SENSATION IN FEET: 0

## 2025-07-18 NOTE — PROGRESS NOTES
"Subjective   Patient ID: Francisco Lizama is a 80 y.o. male who presents for Annual Exam.    HPI   CPE see updated front sheet no chest pain no shortness of breath after medication change he has been doing well bowels have been okay does have some difficulty with straining on urination and nocturia is on Flomax but was advised not to increase his Flomax dosing bones muscles joints mostly okay sometimes some left lower leg discomfort    Past medical history noted and unchanged GERD ASCVD    Medications noted and unchanged    Allergies Brilinta noted and unchanged see EMR    Social history no tobacco    Family history noted and unchanged    Prevention baldemar chi and other exercises had completed cardiac rehab?  Discussed with colonoscopy some prior blood work reviewed    Depression screen not depressed  Review of Systems    Objective   Pulse 72   Resp 16   Ht 1.727 m (5' 8\")   Wt 71.7 kg (158 lb)   SpO2 98%   BMI 24.02 kg/m²     Physical Exam vital signs noted alert and oriented x 3 NCAT PERRLA EOMI nares without discharge OP benign TM normal bilateral EAC clear bilateral no AC nodes no JVD no thyromegaly chest clear to auscultation cor regular rate and rhythm S1-S2 without murmur gallop or rub abdomen soft nontender normal active bowel sounds LS spine normal curvature negative straight leg raise extremities no clubbing cyanosis or edema normal distal pulses DTR 2+    Assessment/Plan impression General Medical examination all diagnoses other diagnoses ASCVD GERD BPH ED hypothyroid gout  Plan discussed with the Flomax and they continue may consider saw palmetto he will review this before consideration also for erectile dysfunction and may consider other libido type laboratory including PSA and the testosterone we will recheck his blood work in October 2025 and yearly blood work or any other blood work that may be needed at that time including uric acid for gout and advise on medication TSH is normal thyroid see prior " avoidance of NSAIDs okay for PPI or H2 blocker follow-up with cardiology follow-up with ophthalmology follow-up with dermatology good diet regular exercise good water consumption advised on any further colonoscopy back hygiene back stretches Tylenol as needed then recheck 3 months as above TT 50 cc 26

## 2025-07-19 ASSESSMENT — PATIENT HEALTH QUESTIONNAIRE - PHQ9
1. LITTLE INTEREST OR PLEASURE IN DOING THINGS: NOT AT ALL
2. FEELING DOWN, DEPRESSED OR HOPELESS: NOT AT ALL
SUM OF ALL RESPONSES TO PHQ9 QUESTIONS 1 AND 2: 0

## 2025-07-19 ASSESSMENT — ACTIVITIES OF DAILY LIVING (ADL)
BATHING: INDEPENDENT
MANAGING_FINANCES: INDEPENDENT
TAKING_MEDICATION: INDEPENDENT
DRESSING: INDEPENDENT
DOING_HOUSEWORK: INDEPENDENT
GROCERY_SHOPPING: INDEPENDENT

## 2025-08-13 ENCOUNTER — OFFICE VISIT (OUTPATIENT)
Dept: CARDIOLOGY | Facility: HOSPITAL | Age: 80
End: 2025-08-13
Payer: MEDICARE

## 2025-08-13 VITALS
WEIGHT: 159.4 LBS | DIASTOLIC BLOOD PRESSURE: 77 MMHG | HEART RATE: 67 BPM | BODY MASS INDEX: 24.16 KG/M2 | OXYGEN SATURATION: 99 % | SYSTOLIC BLOOD PRESSURE: 122 MMHG | HEIGHT: 68 IN

## 2025-08-13 DIAGNOSIS — I21.4 NSTEMI (NON-ST ELEVATED MYOCARDIAL INFARCTION) (MULTI): ICD-10-CM

## 2025-08-13 DIAGNOSIS — R06.09 DOE (DYSPNEA ON EXERTION): ICD-10-CM

## 2025-08-13 DIAGNOSIS — I25.2 OLD MYOCARDIAL INFARCTION: ICD-10-CM

## 2025-08-13 DIAGNOSIS — Z95.5 STATUS POST PLACEMENT OF STENT IN RIGHT CORONARY ARTERY: ICD-10-CM

## 2025-08-13 DIAGNOSIS — Z98.61 S/P PERCUTANEOUS TRANSLUMINAL CORONARY ANGIOPLASTY: ICD-10-CM

## 2025-08-13 DIAGNOSIS — R07.2 PRECORDIAL PAIN: Primary | ICD-10-CM

## 2025-08-13 PROCEDURE — G2211 COMPLEX E/M VISIT ADD ON: HCPCS | Performed by: STUDENT IN AN ORGANIZED HEALTH CARE EDUCATION/TRAINING PROGRAM

## 2025-08-13 PROCEDURE — 1159F MED LIST DOCD IN RCRD: CPT | Performed by: STUDENT IN AN ORGANIZED HEALTH CARE EDUCATION/TRAINING PROGRAM

## 2025-08-13 PROCEDURE — 1036F TOBACCO NON-USER: CPT | Performed by: STUDENT IN AN ORGANIZED HEALTH CARE EDUCATION/TRAINING PROGRAM

## 2025-08-13 PROCEDURE — 1160F RVW MEDS BY RX/DR IN RCRD: CPT | Performed by: STUDENT IN AN ORGANIZED HEALTH CARE EDUCATION/TRAINING PROGRAM

## 2025-08-13 PROCEDURE — 1126F AMNT PAIN NOTED NONE PRSNT: CPT | Performed by: STUDENT IN AN ORGANIZED HEALTH CARE EDUCATION/TRAINING PROGRAM

## 2025-08-13 PROCEDURE — RXMED WILLOW AMBULATORY MEDICATION CHARGE

## 2025-08-13 PROCEDURE — 99212 OFFICE O/P EST SF 10 MIN: CPT

## 2025-08-13 PROCEDURE — 99215 OFFICE O/P EST HI 40 MIN: CPT | Performed by: STUDENT IN AN ORGANIZED HEALTH CARE EDUCATION/TRAINING PROGRAM

## 2025-08-13 RX ORDER — ROSUVASTATIN CALCIUM 40 MG/1
40 TABLET, COATED ORAL NIGHTLY
Qty: 90 TABLET | Refills: 0 | Status: SHIPPED | OUTPATIENT
Start: 2025-08-13

## 2025-08-13 RX ORDER — SPIRONOLACTONE 25 MG/1
12.5 TABLET ORAL DAILY
Qty: 45 TABLET | Refills: 3 | Status: SHIPPED | OUTPATIENT
Start: 2025-08-13 | End: 2026-08-13

## 2025-08-13 ASSESSMENT — PAIN SCALES - GENERAL: PAINLEVEL_OUTOF10: 0-NO PAIN

## 2025-08-14 LAB
ALBUMIN SERPL-MCNC: 4.6 G/DL (ref 3.6–5.1)
ALP SERPL-CCNC: 65 U/L (ref 35–144)
ALT SERPL-CCNC: 18 U/L (ref 9–46)
ANION GAP SERPL CALCULATED.4IONS-SCNC: 10 MMOL/L (CALC) (ref 7–17)
AST SERPL-CCNC: 18 U/L (ref 10–35)
BILIRUB SERPL-MCNC: 1.3 MG/DL (ref 0.2–1.2)
BNP SERPL-MCNC: 16 PG/ML
BUN SERPL-MCNC: 11 MG/DL (ref 7–25)
CALCIUM SERPL-MCNC: 9.6 MG/DL (ref 8.6–10.3)
CHLORIDE SERPL-SCNC: 105 MMOL/L (ref 98–110)
CHOLEST SERPL-MCNC: 112 MG/DL
CHOLEST/HDLC SERPL: 2.2 (CALC)
CO2 SERPL-SCNC: 25 MMOL/L (ref 20–32)
CREAT SERPL-MCNC: 0.87 MG/DL (ref 0.7–1.28)
CRP SERPL HS-MCNC: 18.3 MG/L
EGFRCR SERPLBLD CKD-EPI 2021: 88 ML/MIN/1.73M2
ERYTHROCYTE [DISTWIDTH] IN BLOOD BY AUTOMATED COUNT: 12.8 % (ref 11–15)
GLUCOSE SERPL-MCNC: 56 MG/DL (ref 65–139)
HCT VFR BLD AUTO: 43.7 % (ref 38.5–50)
HDLC SERPL-MCNC: 51 MG/DL
HGB BLD-MCNC: 14.5 G/DL (ref 13.2–17.1)
LDLC SERPL CALC-MCNC: 43 MG/DL (CALC)
MCH RBC QN AUTO: 30.5 PG (ref 27–33)
MCHC RBC AUTO-ENTMCNC: 33.2 G/DL (ref 32–36)
MCV RBC AUTO: 92 FL (ref 80–100)
NONHDLC SERPL-MCNC: 61 MG/DL (CALC)
PLATELET # BLD AUTO: 167 THOUSAND/UL (ref 140–400)
PMV BLD REES-ECKER: 11.7 FL (ref 7.5–12.5)
POTASSIUM SERPL-SCNC: 4.6 MMOL/L (ref 3.5–5.3)
PROT SERPL-MCNC: 6.9 G/DL (ref 6.1–8.1)
RBC # BLD AUTO: 4.75 MILLION/UL (ref 4.2–5.8)
SODIUM SERPL-SCNC: 140 MMOL/L (ref 135–146)
TRIGL SERPL-MCNC: 98 MG/DL
TROPONIN I SERPL-MCNC: 40 NG/L
WBC # BLD AUTO: 7.8 THOUSAND/UL (ref 3.8–10.8)

## 2025-08-17 LAB
PSA SERPL-MCNC: 1.28 NG/ML
TESTOST FREE SERPL-MCNC: 42.5 PG/ML (ref 30–135)
TESTOST SERPL-MCNC: 327 NG/DL (ref 250–1100)
TSH SERPL-ACNC: 1.58 MIU/L (ref 0.4–4.5)
URATE SERPL-MCNC: 5.8 MG/DL (ref 4–8)

## 2025-08-18 ENCOUNTER — PHARMACY VISIT (OUTPATIENT)
Dept: PHARMACY | Facility: CLINIC | Age: 80
End: 2025-08-18
Payer: COMMERCIAL

## 2025-08-25 DIAGNOSIS — R39.12 WEAK URINARY STREAM: ICD-10-CM

## 2025-08-30 PROCEDURE — RXMED WILLOW AMBULATORY MEDICATION CHARGE

## 2025-08-30 RX ORDER — TAMSULOSIN HYDROCHLORIDE 0.4 MG/1
0.4 CAPSULE ORAL DAILY
Qty: 90 CAPSULE | Refills: 0 | Status: SHIPPED | OUTPATIENT
Start: 2025-08-30 | End: 2025-11-28

## 2025-09-02 DIAGNOSIS — R06.09 DOE (DYSPNEA ON EXERTION): ICD-10-CM

## 2025-09-02 DIAGNOSIS — I21.4 NSTEMI (NON-ST ELEVATED MYOCARDIAL INFARCTION) (MULTI): ICD-10-CM

## 2025-09-02 PROCEDURE — RXMED WILLOW AMBULATORY MEDICATION CHARGE

## 2025-09-04 PROCEDURE — RXMED WILLOW AMBULATORY MEDICATION CHARGE

## 2025-09-04 RX ORDER — SPIRONOLACTONE 25 MG/1
25 TABLET ORAL DAILY
Qty: 30 TABLET | Refills: 11 | Status: SHIPPED | OUTPATIENT
Start: 2025-09-04 | End: 2026-09-04

## 2025-09-06 ENCOUNTER — PHARMACY VISIT (OUTPATIENT)
Dept: PHARMACY | Facility: CLINIC | Age: 80
End: 2025-09-06
Payer: COMMERCIAL

## 2026-01-14 ENCOUNTER — APPOINTMENT (OUTPATIENT)
Dept: DERMATOLOGY | Facility: CLINIC | Age: 81
End: 2026-01-14
Payer: MEDICARE

## (undated) DEVICE — CATHETER, DIAGNOSTIC, 4 FR-JR 4

## (undated) DEVICE — CATHETER, BALLOON, NC EUPHORA NONCOMPLIANT 3.75 X 8 X 142CM

## (undated) DEVICE — GUIDEWIRE, RUN THROUGH WIRE, 180CM

## (undated) DEVICE — INTRODUCER, GLIDESHEATH SLENDER A-KIT, 6FR 10CM

## (undated) DEVICE — CATHETER, BALLOON DILATION, EUPHORA SEMICOMPLIANT 2.0  X 15 MM X 142CM

## (undated) DEVICE — VALVE, HEMOSTASIS, GUARDIAN II NC, W/ GUIDEWIRE INSERTION TOOL

## (undated) DEVICE — CATHETER, GUIDING, HEARTRAIL III, 6 FR IKARI RIGHT 1.0

## (undated) DEVICE — TR BAND, RADIAL COMPRESSION, STANDARD, 24CM

## (undated) DEVICE — CATHETER, BALLOON, NC EUPHORA NONCOMPLIANT 3.5 X 15 X 142CM

## (undated) DEVICE — GUIDEWIRE, INQWIRE, 3MM J, .035, 260

## (undated) DEVICE — CATHETER, ANGIO, IMPULSE, FL3.5, 5 FR X 100 CM

## (undated) DEVICE — CATHETER, BALLOON, NC EUPHORA NONCOMPLIANT 3.0 X 20 X 142CM

## (undated) DEVICE — CATHETER, DRAGONFLY, OPSTAR